# Patient Record
Sex: FEMALE | Race: BLACK OR AFRICAN AMERICAN | NOT HISPANIC OR LATINO | Employment: OTHER | URBAN - METROPOLITAN AREA
[De-identification: names, ages, dates, MRNs, and addresses within clinical notes are randomized per-mention and may not be internally consistent; named-entity substitution may affect disease eponyms.]

---

## 2019-02-27 ENCOUNTER — OFFICE VISIT (OUTPATIENT)
Dept: FAMILY MEDICINE | Facility: CLINIC | Age: 49
End: 2019-02-27
Payer: MEDICARE

## 2019-02-27 VITALS
TEMPERATURE: 99 F | DIASTOLIC BLOOD PRESSURE: 80 MMHG | RESPIRATION RATE: 12 BRPM | WEIGHT: 184.38 LBS | HEIGHT: 65 IN | HEART RATE: 107 BPM | OXYGEN SATURATION: 93 % | BODY MASS INDEX: 30.72 KG/M2 | SYSTOLIC BLOOD PRESSURE: 117 MMHG

## 2019-02-27 DIAGNOSIS — I10 HYPERTENSION, UNSPECIFIED TYPE: ICD-10-CM

## 2019-02-27 DIAGNOSIS — R53.83 FATIGUE, UNSPECIFIED TYPE: ICD-10-CM

## 2019-02-27 DIAGNOSIS — H92.13 OTORRHEA OF BOTH EARS: Primary | ICD-10-CM

## 2019-02-27 DIAGNOSIS — M06.9 RHEUMATOID ARTHRITIS INVOLVING MULTIPLE SITES, UNSPECIFIED RHEUMATOID FACTOR PRESENCE: ICD-10-CM

## 2019-02-27 PROCEDURE — 99213 OFFICE O/P EST LOW 20 MIN: CPT | Mod: S$GLB,,, | Performed by: FAMILY MEDICINE

## 2019-02-27 PROCEDURE — 99213 PR OFFICE/OUTPT VISIT, EST, LEVL III, 20-29 MIN: ICD-10-PCS | Mod: S$GLB,,, | Performed by: FAMILY MEDICINE

## 2019-02-27 RX ORDER — PANTOPRAZOLE SODIUM 40 MG/1
TABLET, DELAYED RELEASE ORAL
Refills: 3 | COMMUNITY
Start: 2019-02-12 | End: 2019-07-17 | Stop reason: SDUPTHER

## 2019-02-27 RX ORDER — ELETRIPTAN HYDROBROMIDE 20 MG/1
TABLET, FILM COATED ORAL
COMMUNITY
End: 2020-08-17

## 2019-02-27 RX ORDER — AMLODIPINE AND BENAZEPRIL HYDROCHLORIDE 5; 10 MG/1; MG/1
CAPSULE ORAL
COMMUNITY
End: 2019-04-01

## 2019-02-27 RX ORDER — FLUTICASONE PROPIONATE 50 MCG
SPRAY, SUSPENSION (ML) NASAL
COMMUNITY
End: 2019-07-17 | Stop reason: SDUPTHER

## 2019-02-27 RX ORDER — KETOCONAZOLE 20 MG/G
CREAM TOPICAL
COMMUNITY
End: 2020-08-17

## 2019-02-27 RX ORDER — FOLIC ACID 1 MG/1
TABLET ORAL
COMMUNITY

## 2019-02-27 RX ORDER — GABAPENTIN 300 MG/1
CAPSULE ORAL
Refills: 3 | COMMUNITY
Start: 2019-01-17 | End: 2019-10-17 | Stop reason: SDUPTHER

## 2019-02-27 RX ORDER — TRAMADOL HYDROCHLORIDE 50 MG/1
TABLET ORAL
Refills: 2 | COMMUNITY
Start: 2018-12-06 | End: 2019-04-17 | Stop reason: SDUPTHER

## 2019-02-27 RX ORDER — LEFLUNOMIDE 10 MG/1
TABLET ORAL
Refills: 3 | COMMUNITY
Start: 2018-12-17 | End: 2019-09-05 | Stop reason: SDUPTHER

## 2019-02-27 RX ORDER — CARVEDILOL 25 MG/1
TABLET ORAL
Refills: 3 | COMMUNITY
Start: 2018-12-06 | End: 2019-10-17 | Stop reason: SDUPTHER

## 2019-02-27 RX ORDER — BENAZEPRIL HYDROCHLORIDE 10 MG/1
TABLET ORAL
Refills: 3 | COMMUNITY
Start: 2018-12-28 | End: 2019-04-01 | Stop reason: SDUPTHER

## 2019-02-27 RX ORDER — GENTAMICIN SULFATE 3 MG/ML
SOLUTION/ DROPS OPHTHALMIC
Refills: 0 | COMMUNITY
Start: 2018-12-08 | End: 2019-07-11

## 2019-02-27 RX ORDER — TIZANIDINE HYDROCHLORIDE 4 MG/1
CAPSULE, GELATIN COATED ORAL
COMMUNITY
End: 2020-01-21 | Stop reason: SDUPTHER

## 2019-02-27 RX ORDER — PRAVASTATIN SODIUM 80 MG/1
TABLET ORAL
Refills: 3 | COMMUNITY
Start: 2018-12-28 | End: 2019-10-17 | Stop reason: SDUPTHER

## 2019-02-27 RX ORDER — LEVOCETIRIZINE DIHYDROCHLORIDE 5 MG/1
TABLET, FILM COATED ORAL
COMMUNITY
End: 2019-06-06 | Stop reason: SDUPTHER

## 2019-02-27 RX ORDER — FUROSEMIDE 20 MG/1
TABLET ORAL
COMMUNITY

## 2019-02-27 RX ORDER — MUPIROCIN 20 MG/G
OINTMENT TOPICAL
COMMUNITY
End: 2023-08-08 | Stop reason: SDUPTHER

## 2019-02-27 RX ORDER — AMOXICILLIN AND CLAVULANATE POTASSIUM 875; 125 MG/1; MG/1
TABLET, FILM COATED ORAL
Refills: 0 | COMMUNITY
Start: 2019-02-21 | End: 2019-07-11

## 2019-02-27 RX ORDER — PHENDIMETRAZINE TARTRATE 35 MG/1
TABLET ORAL
COMMUNITY
End: 2019-07-17 | Stop reason: SDUPTHER

## 2019-02-27 RX ORDER — VENLAFAXINE HYDROCHLORIDE 75 MG/1
CAPSULE, EXTENDED RELEASE ORAL
Refills: 2 | COMMUNITY
Start: 2018-12-07 | End: 2019-07-17 | Stop reason: SDUPTHER

## 2019-02-27 RX ORDER — PROMETHAZINE HYDROCHLORIDE AND CODEINE PHOSPHATE 6.25; 1 MG/5ML; MG/5ML
SOLUTION ORAL
Refills: 0 | COMMUNITY
Start: 2018-12-28 | End: 2019-07-11

## 2019-02-27 RX ORDER — FENOFIBRATE 200 MG/1
CAPSULE ORAL
Refills: 3 | COMMUNITY
Start: 2018-12-28 | End: 2019-09-04 | Stop reason: SDUPTHER

## 2019-02-27 RX ORDER — TRIAMCINOLONE ACETONIDE 1 MG/G
CREAM TOPICAL
COMMUNITY
End: 2020-08-17

## 2019-02-27 RX ORDER — ASPIRIN 81 MG/1
TABLET ORAL
COMMUNITY
Start: 2011-04-12

## 2019-02-27 NOTE — PROGRESS NOTES
Subjective:       Patient ID: Cinid Magaña is a 48 y.o. female.    Chief Complaint: Follow-up (kidney, HTN, RA) and Sinusitis (infection- 3 rounds of antibiotics without significant improvement)    47 yo female with complaint of both ears draining regularly.  She had a recent bout of redness and swelling of her earlobes on both sides. She was given amoxicillin by her nephrologist and the redness improved.  She states she is concerned about about the chronic recurrent drainage that occurs at night.  She had meningitis in 2011.  She states that she has been feeling tired for at least 6 months. She does have rheumatoid arthritis and chronic kidney disease and hypertension.  She states that she has been with more pain desmond usual over the past month.    Ear Drainage    There is pain in both ears. This is a chronic problem. The current episode started more than 1 month ago. The problem has been waxing and waning. There has been no fever. The pain is at a severity of 2/10. The pain is mild. Associated symptoms include coughing and ear discharge. Pertinent negatives include no diarrhea, headaches, hearing loss, neck pain, rash, rhinorrhea, sore throat or vomiting. She has tried antibiotics for the symptoms. The treatment provided significant relief. There is no history of a chronic ear infection, hearing loss or a tympanostomy tube.     Review of Systems   Constitutional: Negative for fever.   HENT: Positive for ear discharge. Negative for ear pain, hearing loss, postnasal drip, rhinorrhea, sinus pain and sore throat.    Eyes: Negative for redness.   Respiratory: Positive for cough. Negative for chest tightness, shortness of breath and wheezing.    Cardiovascular: Negative for chest pain, palpitations and leg swelling.   Gastrointestinal: Negative for constipation, diarrhea, nausea and vomiting.   Genitourinary: Negative for difficulty urinating and dysuria.   Musculoskeletal: Positive for arthralgias. Negative for  neck pain.   Skin: Negative for rash.   Neurological: Negative for dizziness and headaches.       Objective:      Physical Exam   HENT:   Head: Normocephalic and atraumatic.   Right Ear: Hearing, tympanic membrane, external ear and ear canal normal. No swelling or tenderness.   Left Ear: Hearing, tympanic membrane, external ear and ear canal normal. No swelling or tenderness.   Ears:    Nose: Nose normal.   Mouth/Throat: Uvula is midline, oropharynx is clear and moist and mucous membranes are normal.       Assessment:       1. Otorrhea of both ears    2. Fatigue, unspecified type    3. Rheumatoid arthritis involving multiple sites, unspecified rheumatoid factor presence    4. Hypertension, unspecified type        Plan:       Patient will be referred to an ENT for further eval. She will also get me a copy of her recent labs taken by her nephrologist.

## 2019-02-27 NOTE — PROGRESS NOTES
Subjective:       Patient ID: Cindi Magaña is a 48 y.o. female.    Chief Complaint: Follow-up (kidney, HTN, RA) and Sinusitis (infection- 3 rounds of antibiotics without significant improvement)    47 yo female in for with       Review of Systems   Constitutional: Negative for fever.   HENT: Negative for ear pain, postnasal drip, rhinorrhea, sinus pain and sore throat.    Eyes: Negative for redness.   Respiratory: Negative for cough, chest tightness, shortness of breath and wheezing.    Cardiovascular: Negative for chest pain, palpitations and leg swelling.   Gastrointestinal: Negative for constipation, diarrhea, nausea and vomiting.   Genitourinary: Negative for difficulty urinating and dysuria.   Musculoskeletal: Negative for arthralgias.   Skin: Negative for rash.   Neurological: Negative for dizziness.       Objective:      Physical Exam   Constitutional: She is oriented to person, place, and time. She appears well-developed and well-nourished.   HENT:   Head: Normocephalic and atraumatic.   Eyes: Conjunctivae and EOM are normal. Pupils are equal, round, and reactive to light.   Neck: Normal range of motion. Neck supple.   Cardiovascular: Normal rate, regular rhythm and normal heart sounds.   Pulmonary/Chest: Breath sounds normal. She has no wheezes. She has no rales.   Abdominal: Soft. Bowel sounds are normal. She exhibits no distension and no mass. There is no tenderness. There is no guarding.   Musculoskeletal: Normal range of motion. She exhibits no edema or tenderness.   Neurological: She is alert and oriented to person, place, and time. No cranial nerve deficit.   Skin: Skin is warm and dry. No rash noted. No erythema.   Psychiatric: She has a normal mood and affect. Her behavior is normal.       Assessment:       No diagnosis found.    Plan:       ***

## 2019-04-01 ENCOUNTER — OFFICE VISIT (OUTPATIENT)
Dept: RHEUMATOLOGY | Facility: CLINIC | Age: 49
End: 2019-04-01
Payer: MEDICARE

## 2019-04-01 VITALS
BODY MASS INDEX: 30.99 KG/M2 | TEMPERATURE: 97 F | HEIGHT: 65 IN | RESPIRATION RATE: 16 BRPM | DIASTOLIC BLOOD PRESSURE: 80 MMHG | HEART RATE: 76 BPM | WEIGHT: 186 LBS | SYSTOLIC BLOOD PRESSURE: 110 MMHG

## 2019-04-01 DIAGNOSIS — M46.90 INFLAMMATORY SPONDYLOPATHY, UNSPECIFIED SPINAL REGION: ICD-10-CM

## 2019-04-01 DIAGNOSIS — M06.9 RHEUMATOID ARTHRITIS INVOLVING MULTIPLE SITES, UNSPECIFIED RHEUMATOID FACTOR PRESENCE: ICD-10-CM

## 2019-04-01 DIAGNOSIS — M46.1 INFLAMMATION OF SACROILIAC JOINT: ICD-10-CM

## 2019-04-01 DIAGNOSIS — M54.17 LUMBOSACRAL RADICULOPATHY: ICD-10-CM

## 2019-04-01 DIAGNOSIS — R80.9 PROTEINURIA, UNSPECIFIED TYPE: ICD-10-CM

## 2019-04-01 DIAGNOSIS — R94.4 ABNORMAL CREATININE CLEARANCE GLOMERULAR FILTRATION: ICD-10-CM

## 2019-04-01 DIAGNOSIS — M19.90 UNDIFFERENTIATED INFLAMMATORY OLIGOARTHRITIS: Primary | ICD-10-CM

## 2019-04-01 DIAGNOSIS — M35.00 SJOGREN'S SYNDROME, WITH UNSPECIFIED ORGAN INVOLVEMENT: ICD-10-CM

## 2019-04-01 DIAGNOSIS — M45.9 ANKYLOSING SPONDYLITIS, UNSPECIFIED SITE OF SPINE: ICD-10-CM

## 2019-04-01 PROBLEM — M70.60 TROCHANTERIC BURSITIS: Status: RESOLVED | Noted: 2019-04-01 | Resolved: 2019-04-01

## 2019-04-01 PROBLEM — N18.9 CHRONIC RENAL IMPAIRMENT: Status: ACTIVE | Noted: 2019-04-01

## 2019-04-01 PROBLEM — M70.60 TROCHANTERIC BURSITIS: Status: ACTIVE | Noted: 2019-04-01

## 2019-04-01 PROCEDURE — 99214 PR OFFICE/OUTPT VISIT, EST, LEVL IV, 30-39 MIN: ICD-10-PCS | Mod: S$GLB,,, | Performed by: INTERNAL MEDICINE

## 2019-04-01 PROCEDURE — 99214 OFFICE O/P EST MOD 30 MIN: CPT | Mod: S$GLB,,, | Performed by: INTERNAL MEDICINE

## 2019-04-01 RX ORDER — BENAZEPRIL HYDROCHLORIDE 10 MG/1
TABLET ORAL
Qty: 90 TABLET | Refills: 3 | Status: SHIPPED | OUTPATIENT
Start: 2019-04-01 | End: 2020-04-03 | Stop reason: SDUPTHER

## 2019-04-01 NOTE — PROGRESS NOTES
Subjective:       Patient ID: Cindi Magaña is a 48 y.o. female.    Chief Complaint: Follow-up (with labs)    HPI no facial skin rash hair loss, sore in mouth or nose and mnom acuge gouty arthritis.cointinue ion gabapentin 300 mg tid and is on Gabapentin helping back and neck pain        Current medications include:  Current Outpatient Medications on File Prior to Visit   Medication Sig Dispense Refill    amlodipine-benazepril 5-10 mg (LOTREL) 5-10 mg per capsule amlodipine 5 mg-benazepril 10 mg capsule      amoxicillin-clavulanate 875-125mg (AUGMENTIN) 875-125 mg per tablet TK 1 T PO BID  FOR 10 DAYS  0    aspirin (ECOTRIN) 81 MG EC tablet Aspir-81 81 mg tablet,delayed release   Take 1 tablet every day by oral route.      benazepril (LOTENSIN) 10 MG tablet TK 1 T PO  QD  3    carvedilol (COREG) 25 MG tablet TK 1 T PO BID  3    eletriptan (RELPAX) 20 MG tablet Relpax 20 mg tablet   Take 1 tablet once daily as needed      fenofibrate micronized (LOFIBRA) 200 MG Cap TK 1 C PO QD  3    fluticasone (FLONASE) 50 mcg/actuation nasal spray fluticasone 50 mcg/actuation nasal spray,suspension   SHAKE LQ AND U 1 SPR IEN BID      folic acid (FOLVITE) 1 MG tablet folic acid 1 mg tablet   TK 2 TS PO QD      furosemide (LASIX) 20 MG tablet furosemide 20 mg tablet      gabapentin (NEURONTIN) 300 MG capsule TK 1 C PO TID  3    gentamicin (GARAMYCIN) 0.3 % ophthalmic solution INT 1 GTT IN LEFT EYE Q 4 H  0    ketoconazole (NIZORAL) 2 % cream ketoconazole 2 % topical cream      leflunomide (ARAVA) 10 MG Tab TK 1 T PO QD WC  3    levocetirizine (XYZAL) 5 MG tablet levocetirizine 5 mg tablet      mupirocin (BACTROBAN) 2 % ointment mupirocin 2 % topical ointment      pantoprazole (PROTONIX) 40 MG tablet TK 1 T PO QD  3    phendimetrazine tartrate 35 mg Tab phendimetrazine tartrate 35 mg tablet   TK 1 T PO BID      pravastatin (PRAVACHOL) 80 MG tablet TK 1 T PO QHS  3    promethazine-codeine 6.25-10 mg/5 ml  "(PHENERGAN WITH CODEINE) 6.25-10 mg/5 mL syrup TK 1 GAVIN PO Q 6 H  0    tiZANidine 4 mg Cap tizanidine 4 mg tablet   TK 1 T PO TID      traMADol (ULTRAM) 50 mg tablet TK 1 T PO Q 12 H PRN  2    triamcinolone acetonide 0.1% (KENALOG) 0.1 % cream triamcinolone acetonide 0.1 % topical ointment      venlafaxine (EFFEXOR-XR) 75 MG 24 hr capsule TK 1 C PO QD  2     No current facility-administered medications on file prior to visit.        Lab Results:  No results found for: WBC, RBC, HGB, HCT, MCV, COLORU, SPECGRAV, PHUR, WBCUR, NITRITE, GLUCOSEUR, KETONESU, BILIRUBINUR, RBCUR, NA, K, CL, CO2, GLU, BUN, CREATININE     Review of Systems   Constitutional: Positive for diaphoresis.   HENT: Negative.         Dryness in eyes and m,outh   Respiratory: Negative.    Cardiovascular: Negative.    Endocrine: Negative.    Genitourinary: Positive for difficulty urinating and frequency.   Musculoskeletal: Positive for arthralgias and back pain.   Allergic/Immunologic: Positive for environmental allergies.   Neurological: Positive for weakness and numbness.   Hematological: Positive for adenopathy.         Objective:   /80 (BP Location: Right arm, Patient Position: Sitting, BP Method: Large (Manual))   Pulse 76   Temp 97 °F (36.1 °C) (Temporal)   Resp 16   Ht 5' 5" (1.651 m)   Wt 84.4 kg (186 lb)   BMI 30.95 kg/m²      Physical Exam   Constitutional: She is well-developed, well-nourished, and in no distress.   HENT:   Dry eyes and mouth   Neck: Neck supple.   Cardiovascular: Normal rate and regular rhythm.    Abdominal: Soft.       Right Side Rheumatological Exam     The patient is tender to palpation of the shoulder    Muscle Strength (0-5 scale):  Neck Flexion:  4  Deltoid:  4  : 4/5   Quadriceps:  4     Left Side Rheumatological Exam     The patient is tender to palpation of the shoulder.    Muscle Strength (0-5 scale):  Neck Flexion:  4  Deltoid:  4  Quadriceps:  4       Musculoskeletal: Normal range of motion. "   SLR=negative           Assessment:       1. Undifferentiated inflammatory oligoarthritis    2. Ankylosing spondylitis, unspecified site of spine    3. Inflammation of sacroiliac joint    4. Inflammatory spondylopathy, unspecified spinal region    5. Rheumatoid arthritis involving multiple sites, unspecified rheumatoid factor presence    6. Sjogren's syndrome, with unspecified organ involvement    7. Abnormal creatinine clearance glomerular filtration    8. Proteinuria, unspecified type    9. Lumbosacral radiculopathy            Plan:       conyinue urrent medication and dosis

## 2019-04-17 ENCOUNTER — OFFICE VISIT (OUTPATIENT)
Dept: FAMILY MEDICINE | Facility: CLINIC | Age: 49
End: 2019-04-17
Payer: MEDICARE

## 2019-04-17 VITALS
HEIGHT: 65 IN | TEMPERATURE: 98 F | BODY MASS INDEX: 31.35 KG/M2 | WEIGHT: 188.19 LBS | HEART RATE: 76 BPM | DIASTOLIC BLOOD PRESSURE: 87 MMHG | SYSTOLIC BLOOD PRESSURE: 122 MMHG

## 2019-04-17 DIAGNOSIS — I10 ESSENTIAL HYPERTENSION: ICD-10-CM

## 2019-04-17 DIAGNOSIS — M54.16 LUMBAR RADICULOPATHY, CHRONIC: Primary | ICD-10-CM

## 2019-04-17 DIAGNOSIS — K59.00 CONSTIPATION, UNSPECIFIED CONSTIPATION TYPE: ICD-10-CM

## 2019-04-17 PROBLEM — M75.50 BURSITIS OF SHOULDER: Status: ACTIVE | Noted: 2019-04-17

## 2019-04-17 PROBLEM — I63.9 CEREBROVASCULAR ACCIDENT: Status: ACTIVE | Noted: 2019-04-17

## 2019-04-17 PROBLEM — M47.817 LUMBOSACRAL SPONDYLOSIS: Status: ACTIVE | Noted: 2019-04-17

## 2019-04-17 PROBLEM — E78.5 HYPERLIPIDEMIA: Status: ACTIVE | Noted: 2018-01-26

## 2019-04-17 PROCEDURE — 99214 OFFICE O/P EST MOD 30 MIN: CPT | Mod: S$GLB,,, | Performed by: FAMILY MEDICINE

## 2019-04-17 PROCEDURE — 99214 PR OFFICE/OUTPT VISIT, EST, LEVL IV, 30-39 MIN: ICD-10-PCS | Mod: S$GLB,,, | Performed by: FAMILY MEDICINE

## 2019-04-17 RX ORDER — POLYETHYLENE GLYCOL 3350 17 G/17G
17 POWDER, FOR SOLUTION ORAL DAILY
Qty: 510 G | Refills: 3 | Status: SHIPPED | OUTPATIENT
Start: 2019-04-17 | End: 2019-04-24 | Stop reason: SDUPTHER

## 2019-04-17 RX ORDER — TRAMADOL HYDROCHLORIDE 50 MG/1
TABLET ORAL
Qty: 60 TABLET | Refills: 2 | Status: SHIPPED | OUTPATIENT
Start: 2019-04-17 | End: 2019-07-17 | Stop reason: SDUPTHER

## 2019-04-17 NOTE — PROGRESS NOTES
Subjective:       Patient ID: Cindi Magaña is a 48 y.o. female.    Chief Complaint: No chief complaint on file.    47 yo female in for follow up.  She was referred to ENT at last visit for bilateral ear itching and irritation.  She was given CiproDex and Clotbetasol topical solutions which helped. She complains of low back pain that is radiating down the left leg.  Se states that her leg pain is getting worse than before.  She has tried different meds and PT and it has helped only temporarily.  She also has RA and ankylosing spondylitis as well. She states her blood pressure has been doing good. She need a refill of tramadol and she need a refill of Miralax for constipation.    Back Pain   This is a chronic problem. The current episode started more than 1 year ago. The problem occurs constantly. The problem has been gradually worsening since onset. The pain is present in the gluteal and lumbar spine. The quality of the pain is described as aching and shooting. The pain radiates to the right foot. The pain is at a severity of 7/10. The pain is moderate. The symptoms are aggravated by standing and sitting. Associated symptoms include headaches, leg pain, numbness and tingling. Pertinent negatives include no abdominal pain, bladder incontinence, bowel incontinence, chest pain, dysuria, fever, paresis, paresthesias, pelvic pain, perianal numbness, weakness or weight loss. Risk factors include obesity and menopause. She has tried analgesics, muscle relaxant, NSAIDs and walking for the symptoms. The treatment provided mild relief.     Review of Systems   Constitutional: Negative for fever and weight loss.   HENT: Negative for ear pain, postnasal drip, rhinorrhea, sinus pain and sore throat.    Eyes: Negative for redness.   Respiratory: Negative for cough, chest tightness, shortness of breath and wheezing.    Cardiovascular: Negative for chest pain, palpitations and leg swelling.   Gastrointestinal: Negative for  abdominal pain, bowel incontinence, constipation, diarrhea, nausea and vomiting.   Genitourinary: Negative for bladder incontinence, difficulty urinating, dysuria and pelvic pain.   Musculoskeletal: Positive for arthralgias and back pain.   Skin: Negative for rash.   Neurological: Positive for tingling, numbness and headaches. Negative for dizziness, weakness and paresthesias.       Objective:      Physical Exam   Constitutional: She is oriented to person, place, and time. She appears well-developed and well-nourished.   HENT:   Head: Normocephalic and atraumatic.   Eyes: Pupils are equal, round, and reactive to light. Conjunctivae and EOM are normal.   Neck: Normal range of motion. Neck supple.   Cardiovascular: Normal rate, regular rhythm and normal heart sounds.   Pulmonary/Chest: Breath sounds normal. She has no wheezes. She has no rales.   Abdominal: Soft. Bowel sounds are normal. She exhibits no distension and no mass. There is no tenderness. There is no guarding.   Musculoskeletal: Normal range of motion. She exhibits no edema.        Lumbar back: She exhibits tenderness, pain and spasm.        Right upper leg: She exhibits tenderness.   Neurological: She is alert and oriented to person, place, and time. No cranial nerve deficit.   Skin: Skin is warm and dry. No rash noted. No erythema.   Psychiatric: She has a normal mood and affect. Her behavior is normal.       Assessment:       1. Lumbar radiculopathy, chronic    2. Constipation, unspecified constipation type    3. Essential hypertension        Plan:     MRI of the lumbar spine for further eval and send for a LEDSI if needed.    Refill tramadol and Miralax.    Follow up in 3 months.

## 2019-04-24 DIAGNOSIS — K59.00 CONSTIPATION, UNSPECIFIED CONSTIPATION TYPE: ICD-10-CM

## 2019-04-24 RX ORDER — POLYETHYLENE GLYCOL 3350 17 G/17G
17 POWDER, FOR SOLUTION ORAL DAILY
Qty: 510 G | Refills: 3 | Status: SHIPPED | OUTPATIENT
Start: 2019-04-24 | End: 2021-07-27

## 2019-04-25 DIAGNOSIS — M51.9 LUMBAR DISC DISEASE: Primary | ICD-10-CM

## 2019-06-06 RX ORDER — LEVOCETIRIZINE DIHYDROCHLORIDE 5 MG/1
5 TABLET, FILM COATED ORAL NIGHTLY
Qty: 30 TABLET | Refills: 5 | Status: SHIPPED | OUTPATIENT
Start: 2019-06-06 | End: 2020-08-17

## 2019-07-11 ENCOUNTER — OFFICE VISIT (OUTPATIENT)
Dept: RHEUMATOLOGY | Facility: CLINIC | Age: 49
End: 2019-07-11
Payer: MEDICARE

## 2019-07-11 VITALS
HEART RATE: 71 BPM | SYSTOLIC BLOOD PRESSURE: 100 MMHG | WEIGHT: 194 LBS | TEMPERATURE: 96 F | DIASTOLIC BLOOD PRESSURE: 70 MMHG | BODY MASS INDEX: 32.32 KG/M2 | RESPIRATION RATE: 16 BRPM | HEIGHT: 65 IN

## 2019-07-11 DIAGNOSIS — N18.9 CHRONIC RENAL IMPAIRMENT, UNSPECIFIED CKD STAGE: ICD-10-CM

## 2019-07-11 DIAGNOSIS — R80.9 PROTEINURIA, UNSPECIFIED TYPE: ICD-10-CM

## 2019-07-11 DIAGNOSIS — M17.9 OSTEOARTHRITIS OF KNEE, UNSPECIFIED LATERALITY, UNSPECIFIED OSTEOARTHRITIS TYPE: ICD-10-CM

## 2019-07-11 DIAGNOSIS — M45.9 ANKYLOSING SPONDYLITIS, UNSPECIFIED SITE OF SPINE: Primary | ICD-10-CM

## 2019-07-11 DIAGNOSIS — M35.00 SJOGREN'S SYNDROME WITHOUT EXTRAGLANDULAR INVOLVEMENT: ICD-10-CM

## 2019-07-11 DIAGNOSIS — M75.50 BURSITIS OF SHOULDER, UNSPECIFIED LATERALITY: ICD-10-CM

## 2019-07-11 PROBLEM — G56.00 CARPAL TUNNEL SYNDROME: Status: ACTIVE | Noted: 2019-07-11

## 2019-07-11 PROCEDURE — 99214 OFFICE O/P EST MOD 30 MIN: CPT | Mod: S$GLB,,, | Performed by: INTERNAL MEDICINE

## 2019-07-11 PROCEDURE — 99214 PR OFFICE/OUTPT VISIT, EST, LEVL IV, 30-39 MIN: ICD-10-PCS | Mod: S$GLB,,, | Performed by: INTERNAL MEDICINE

## 2019-07-11 NOTE — PROGRESS NOTES
Subjective:       Patient ID: Cindi Magaña is a 48 y.o. female.    Chief Complaint: Follow-up    HPI continue on Leflunomide 10 mg once a day with folic acid. Gabapentin 300 mg tid and has helped lower back and neck . No acutely swollen tender painful inflammed joint. Has lower back associated with sciatica and obtaine relikef  After 2 injectiont . Dryness in eye helped by Systane and sipws more water.Sees Dr Kay and gfr stable. Shoulders with stiffness         Current medications include:  Current Outpatient Medications on File Prior to Visit   Medication Sig Dispense Refill    aspirin (ECOTRIN) 81 MG EC tablet Aspir-81 81 mg tablet,delayed release   Take 1 tablet every day by oral route.      benazepril (LOTENSIN) 10 MG tablet TAKE 1 TABLET BY MOUTH EVERY DAY 90 tablet 3    carvedilol (COREG) 25 MG tablet TK 1 T PO BID  3    eletriptan (RELPAX) 20 MG tablet Relpax 20 mg tablet   Take 1 tablet once daily as needed      fenofibrate micronized (LOFIBRA) 200 MG Cap TK 1 C PO QD  3    fluticasone (FLONASE) 50 mcg/actuation nasal spray fluticasone 50 mcg/actuation nasal spray,suspension   SHAKE LQ AND U 1 SPR IEN BID      folic acid (FOLVITE) 1 MG tablet folic acid 1 mg tablet   TK 2 TS PO QD      furosemide (LASIX) 20 MG tablet furosemide 20 mg tablet      gabapentin (NEURONTIN) 300 MG capsule TK 1 C PO TID  3    ketoconazole (NIZORAL) 2 % cream ketoconazole 2 % topical cream      leflunomide (ARAVA) 10 MG Tab TK 1 T PO QD WC  3    levocetirizine (XYZAL) 5 MG tablet Take 1 tablet (5 mg total) by mouth every evening. 30 tablet 5    mupirocin (BACTROBAN) 2 % ointment mupirocin 2 % topical ointment      pantoprazole (PROTONIX) 40 MG tablet TK 1 T PO QD  3    phendimetrazine tartrate 35 mg Tab phendimetrazine tartrate 35 mg tablet   TK 1 T PO BID      polyethylene glycol (GLYCOLAX) 17 gram/dose powder Take 17 g by mouth once daily. 510 g 3    pravastatin (PRAVACHOL) 80 MG tablet TK 1 T PO QHS  3  "   tiZANidine 4 mg Cap tizanidine 4 mg tablet   TK 1 T PO TID      traMADol (ULTRAM) 50 mg tablet TK 1 T PO Q 12 H PRN 60 tablet 2    triamcinolone acetonide 0.1% (KENALOG) 0.1 % cream triamcinolone acetonide 0.1 % topical ointment      venlafaxine (EFFEXOR-XR) 75 MG 24 hr capsule TK 1 C PO QD  2    [DISCONTINUED] amoxicillin-clavulanate 875-125mg (AUGMENTIN) 875-125 mg per tablet TK 1 T PO BID  FOR 10 DAYS  0    [DISCONTINUED] ciprofloxacin-hydrocortisone 0.2-1% (CIPRO HC) otic suspension Place 1 drop into both ears 2 (two) times daily as needed.      [DISCONTINUED] gentamicin (GARAMYCIN) 0.3 % ophthalmic solution INT 1 GTT IN LEFT EYE Q 4 H  0    [DISCONTINUED] promethazine-codeine 6.25-10 mg/5 ml (PHENERGAN WITH CODEINE) 6.25-10 mg/5 mL syrup TK 1 GAVIN PO Q 6 H  0     No current facility-administered medications on file prior to visit.        Lab Results:  No results found for: WBC, RBC, HGB, HCT, MCV, COLORU, SPECGRAV, PHUR, WBCUR, NITRITE, GLUCOSEUR, KETONESU, BILIRUBINUR, RBCUR, NA, K, CL, CO2, GLU, BUN, CREATININE     Review of Systems   Constitutional: Positive for diaphoresis.   HENT:        Dryness in eyes and mouth   Eyes: Negative.    Respiratory: Negative.    Cardiovascular: Negative.    Gastrointestinal: Positive for abdominal pain, constipation and nausea. Negative for abdominal distention.   Endocrine: Negative.    Genitourinary: Positive for frequency.   Musculoskeletal: Positive for arthralgias and back pain.   Allergic/Immunologic: Positive for environmental allergies.   Neurological: Positive for numbness.        And tingling in feet   Hematological: Negative.    Psychiatric/Behavioral: Negative.          Objective:   /70 (BP Location: Right arm, Patient Position: Sitting, BP Method: Large (Manual))   Pulse 71   Temp 96.1 °F (35.6 °C) (Temporal)   Resp 16   Ht 5' 5" (1.651 m)   Wt 88 kg (194 lb)   BMI 32.28 kg/m²      Physical Exam   Constitutional: She is well-developed, " well-nourished, and in no distress.   HENT:   Dryness in eyes and mouth   Eyes: Conjunctivae are normal.   Neck: Normal range of motion. Neck supple.   Cardiovascular: Normal rate, regular rhythm and normal heart sounds.    Pulmonary/Chest: Effort normal and breath sounds normal.   Abdominal: Soft.   Neurological:   SLR=negative   Skin: Skin is warm and dry.     Musculoskeletal:   Rt shoulder sa bursae tenderness and POM           Assessment:       1. Ankylosing spondylitis, unspecified site of spine    2. Sjogren's syndrome without extraglandular involvement    3. Bursitis of shoulder, unspecified laterality    4. Osteoarthritis of knee, unspecified laterality, unspecified osteoarthritis type    5. Chronic renal impairment, unspecified CKD stage    6. Proteinuria, unspecified type            Plan:        will continue curent medications and dosis

## 2019-07-17 ENCOUNTER — OFFICE VISIT (OUTPATIENT)
Dept: FAMILY MEDICINE | Facility: CLINIC | Age: 49
End: 2019-07-17
Payer: MEDICARE

## 2019-07-17 VITALS
WEIGHT: 196.5 LBS | HEIGHT: 65 IN | DIASTOLIC BLOOD PRESSURE: 86 MMHG | SYSTOLIC BLOOD PRESSURE: 119 MMHG | HEART RATE: 73 BPM | OXYGEN SATURATION: 97 % | TEMPERATURE: 96 F | BODY MASS INDEX: 32.74 KG/M2

## 2019-07-17 DIAGNOSIS — M06.9 RHEUMATOID ARTHRITIS INVOLVING MULTIPLE SITES, UNSPECIFIED RHEUMATOID FACTOR PRESENCE: ICD-10-CM

## 2019-07-17 DIAGNOSIS — M54.16 LUMBAR RADICULOPATHY, CHRONIC: ICD-10-CM

## 2019-07-17 DIAGNOSIS — Z12.31 ENCOUNTER FOR SCREENING MAMMOGRAM FOR BREAST CANCER: ICD-10-CM

## 2019-07-17 DIAGNOSIS — K21.9 GASTROESOPHAGEAL REFLUX DISEASE WITHOUT ESOPHAGITIS: ICD-10-CM

## 2019-07-17 DIAGNOSIS — F41.9 ANXIETY: ICD-10-CM

## 2019-07-17 DIAGNOSIS — J30.9 ALLERGIC RHINITIS, UNSPECIFIED SEASONALITY, UNSPECIFIED TRIGGER: ICD-10-CM

## 2019-07-17 DIAGNOSIS — E66.09 CLASS 1 OBESITY DUE TO EXCESS CALORIES WITH SERIOUS COMORBIDITY AND BODY MASS INDEX (BMI) OF 32.0 TO 32.9 IN ADULT: ICD-10-CM

## 2019-07-17 DIAGNOSIS — I10 ESSENTIAL HYPERTENSION: ICD-10-CM

## 2019-07-17 PROBLEM — E66.811 CLASS 1 OBESITY DUE TO EXCESS CALORIES WITH SERIOUS COMORBIDITY AND BODY MASS INDEX (BMI) OF 32.0 TO 32.9 IN ADULT: Status: ACTIVE | Noted: 2019-07-17

## 2019-07-17 PROCEDURE — 99214 OFFICE O/P EST MOD 30 MIN: CPT | Mod: S$GLB,,, | Performed by: FAMILY MEDICINE

## 2019-07-17 PROCEDURE — 99214 PR OFFICE/OUTPT VISIT, EST, LEVL IV, 30-39 MIN: ICD-10-PCS | Mod: S$GLB,,, | Performed by: FAMILY MEDICINE

## 2019-07-17 RX ORDER — VENLAFAXINE HYDROCHLORIDE 75 MG/1
CAPSULE, EXTENDED RELEASE ORAL
Qty: 90 CAPSULE | Refills: 3 | Status: SHIPPED | OUTPATIENT
Start: 2019-07-17 | End: 2020-08-17 | Stop reason: SDUPTHER

## 2019-07-17 RX ORDER — TRAMADOL HYDROCHLORIDE 50 MG/1
TABLET ORAL
Qty: 60 TABLET | Refills: 2 | Status: SHIPPED | OUTPATIENT
Start: 2019-07-17 | End: 2019-10-16 | Stop reason: SDUPTHER

## 2019-07-17 RX ORDER — FLUTICASONE PROPIONATE 50 MCG
SPRAY, SUSPENSION (ML) NASAL
Qty: 3 BOTTLE | Refills: 3 | Status: SHIPPED | OUTPATIENT
Start: 2019-07-17 | End: 2022-02-07 | Stop reason: SDUPTHER

## 2019-07-17 RX ORDER — PHENDIMETRAZINE TARTRATE 35 MG/1
TABLET ORAL
Qty: 60 EACH | Refills: 2 | Status: SHIPPED | OUTPATIENT
Start: 2019-07-17 | End: 2020-08-17

## 2019-07-17 RX ORDER — DICLOFENAC SODIUM 10 MG/G
2 GEL TOPICAL 4 TIMES DAILY
Qty: 3 TUBE | Refills: 3 | Status: SHIPPED | OUTPATIENT
Start: 2019-07-17 | End: 2020-08-17

## 2019-07-17 RX ORDER — PANTOPRAZOLE SODIUM 40 MG/1
TABLET, DELAYED RELEASE ORAL
Qty: 90 TABLET | Refills: 3 | Status: SHIPPED | OUTPATIENT
Start: 2019-07-17 | End: 2020-08-17 | Stop reason: SDUPTHER

## 2019-07-17 NOTE — PROGRESS NOTES
Subjective:       Patient ID: Cindi Magaña is a 48 y.o. female.    Chief Complaint: Follow-up and Back Pain    48-year-old female in for follow-up.  Patient needs refills of medications that she takes for gastro esophageal reflux disease, allergic rhinitis, anxiety, tramadol for sciatic nerve pain and she wants to restart weight management program.  She did received a steroid injection for her back and it really helped and she is currently not having any back pain. She also needs an order for her annual screening mammogram.    Review of Systems   Constitutional: Negative for fever.   HENT: Negative for ear pain, postnasal drip, rhinorrhea, sinus pain and sore throat.    Eyes: Negative for redness.   Respiratory: Negative for cough, chest tightness, shortness of breath and wheezing.    Cardiovascular: Negative for chest pain, palpitations and leg swelling.   Gastrointestinal: Negative for constipation, diarrhea, nausea and vomiting.   Genitourinary: Negative for difficulty urinating and dysuria.   Musculoskeletal: Positive for arthralgias.   Skin: Negative for rash.   Neurological: Negative for dizziness.       Objective:      Physical Exam   Constitutional: She is oriented to person, place, and time. She appears well-developed and well-nourished.   HENT:   Head: Normocephalic and atraumatic.   Eyes: Pupils are equal, round, and reactive to light. Conjunctivae and EOM are normal.   Neck: Normal range of motion. Neck supple.   Cardiovascular: Normal rate, regular rhythm and normal heart sounds.   Pulmonary/Chest: Breath sounds normal. She has no wheezes. She has no rales.   Abdominal: Soft. Bowel sounds are normal. She exhibits no distension and no mass. There is no tenderness. There is no guarding.   Musculoskeletal: Normal range of motion. She exhibits no edema or tenderness.   Neurological: She is alert and oriented to person, place, and time. No cranial nerve deficit.   Skin: Skin is warm and dry. No rash  noted. No erythema.   Psychiatric: She has a normal mood and affect. Her behavior is normal.   Nursing note and vitals reviewed.      Assessment:       1. Essential hypertension    2. Anxiety    3. Gastroesophageal reflux disease without esophagitis    4. Allergic rhinitis, unspecified seasonality, unspecified trigger    5. Lumbar radiculopathy, chronic    6. Rheumatoid arthritis involving multiple sites, unspecified rheumatoid factor presence    7. Class 1 obesity due to excess calories with serious comorbidity and body mass index (BMI) of 32.0 to 32.9 in adult    8. Encounter for screening mammogram for breast cancer        Plan:     hypertension is chronic and controlled on current med.  Anxiety is chronic and controlled on venlafaxine 75 mg.  Refill of Protonix for GERD.  Refill Flonase for allergic rhinitis.  Refill of tramadol 50 mg twice a day for rheumatoid arthritis.  An order for a screening mammogram will be placed.  Weight loss is strongly encouraged through diet and exercise and I will refill the bontrel to be taken 35 mg twice daily as well.

## 2019-09-04 DIAGNOSIS — E78.2 MIXED HYPERLIPIDEMIA: Primary | ICD-10-CM

## 2019-09-04 DIAGNOSIS — F41.9 ANXIETY: ICD-10-CM

## 2019-09-04 RX ORDER — FENOFIBRATE 200 MG/1
200 CAPSULE ORAL
Qty: 90 CAPSULE | Refills: 3 | Status: SHIPPED | OUTPATIENT
Start: 2019-09-04 | End: 2020-08-17 | Stop reason: SDUPTHER

## 2019-09-05 RX ORDER — LEFLUNOMIDE 10 MG/1
TABLET ORAL
Qty: 90 TABLET | Refills: 3 | Status: SHIPPED | OUTPATIENT
Start: 2019-09-05 | End: 2020-11-17 | Stop reason: SDUPTHER

## 2019-10-16 DIAGNOSIS — M54.16 LUMBAR RADICULOPATHY, CHRONIC: Primary | ICD-10-CM

## 2019-10-16 RX ORDER — TRAMADOL HYDROCHLORIDE 50 MG/1
TABLET ORAL
Qty: 60 TABLET | Refills: 2 | Status: SHIPPED | OUTPATIENT
Start: 2019-10-16 | End: 2019-12-02 | Stop reason: SDUPTHER

## 2019-10-17 ENCOUNTER — OFFICE VISIT (OUTPATIENT)
Dept: FAMILY MEDICINE | Facility: CLINIC | Age: 49
End: 2019-10-17
Payer: MEDICARE

## 2019-10-17 VITALS
WEIGHT: 198 LBS | SYSTOLIC BLOOD PRESSURE: 121 MMHG | HEART RATE: 79 BPM | DIASTOLIC BLOOD PRESSURE: 88 MMHG | BODY MASS INDEX: 32.95 KG/M2 | TEMPERATURE: 97 F | OXYGEN SATURATION: 99 %

## 2019-10-17 DIAGNOSIS — Z76.0 MEDICATION REFILL: ICD-10-CM

## 2019-10-17 DIAGNOSIS — Z01.419 WELL WOMAN EXAM: ICD-10-CM

## 2019-10-17 DIAGNOSIS — E66.09 CLASS 1 OBESITY DUE TO EXCESS CALORIES WITHOUT SERIOUS COMORBIDITY WITH BODY MASS INDEX (BMI) OF 32.0 TO 32.9 IN ADULT: ICD-10-CM

## 2019-10-17 DIAGNOSIS — I10 ESSENTIAL HYPERTENSION: ICD-10-CM

## 2019-10-17 DIAGNOSIS — E78.2 MIXED HYPERLIPIDEMIA: ICD-10-CM

## 2019-10-17 PROCEDURE — 90686 FLU VACCINE (QUAD) GREATER THAN OR EQUAL TO 3YO PRESERVATIVE FREE IM: ICD-10-PCS | Mod: S$GLB,ICN,, | Performed by: FAMILY MEDICINE

## 2019-10-17 PROCEDURE — G0101 CA SCREEN;PELVIC/BREAST EXAM: HCPCS | Mod: S$GLB,ICN,, | Performed by: FAMILY MEDICINE

## 2019-10-17 PROCEDURE — 90686 IIV4 VACC NO PRSV 0.5 ML IM: CPT | Mod: S$GLB,ICN,, | Performed by: FAMILY MEDICINE

## 2019-10-17 PROCEDURE — G0008 ADMIN INFLUENZA VIRUS VAC: HCPCS | Mod: S$GLB,ICN,, | Performed by: FAMILY MEDICINE

## 2019-10-17 PROCEDURE — G0101 PR CA SCREEN;PELVIC/BREAST EXAM: ICD-10-PCS | Mod: S$GLB,ICN,, | Performed by: FAMILY MEDICINE

## 2019-10-17 PROCEDURE — G0008 FLU VACCINE (QUAD) GREATER THAN OR EQUAL TO 3YO PRESERVATIVE FREE IM: ICD-10-PCS | Mod: S$GLB,ICN,, | Performed by: FAMILY MEDICINE

## 2019-10-17 PROCEDURE — 99213 PR OFFICE/OUTPT VISIT, EST, LEVL III, 20-29 MIN: ICD-10-PCS | Mod: 25,S$GLB,ICN, | Performed by: FAMILY MEDICINE

## 2019-10-17 PROCEDURE — 99213 OFFICE O/P EST LOW 20 MIN: CPT | Mod: 25,S$GLB,ICN, | Performed by: FAMILY MEDICINE

## 2019-10-17 RX ORDER — CARVEDILOL 25 MG/1
25 TABLET ORAL 2 TIMES DAILY WITH MEALS
Qty: 180 TABLET | Refills: 3 | Status: SHIPPED | OUTPATIENT
Start: 2019-10-17 | End: 2020-08-17 | Stop reason: SDUPTHER

## 2019-10-17 RX ORDER — GABAPENTIN 300 MG/1
300 CAPSULE ORAL 3 TIMES DAILY
Qty: 270 CAPSULE | Refills: 3 | Status: SHIPPED | OUTPATIENT
Start: 2019-10-17 | End: 2020-01-23 | Stop reason: SDUPTHER

## 2019-10-17 RX ORDER — PRAVASTATIN SODIUM 80 MG/1
80 TABLET ORAL NIGHTLY
Qty: 90 TABLET | Refills: 3 | Status: SHIPPED | OUTPATIENT
Start: 2019-10-17 | End: 2020-08-17 | Stop reason: SDUPTHER

## 2019-10-17 NOTE — PROGRESS NOTES
SUBJECTIVE:   49 y.o. female for annual routine Pap and checkup.  She reports she has been doing well.  She does need a refill on carvedilol pravastatin and gabapentin.  She states she has been trying to lose weight but medication she had been taking has not been helping.  She has been trying to watch what she eats and has been exercising.  Her last Pap smear was about 3 years ago.  She had a colonoscopy done on last year.  She has an appointment for her annual screening mammogram on tomorrow.  Current Outpatient Medications   Medication Sig Dispense Refill    aspirin (ECOTRIN) 81 MG EC tablet Aspir-81 81 mg tablet,delayed release   Take 1 tablet every day by oral route.      benazepril (LOTENSIN) 10 MG tablet TAKE 1 TABLET BY MOUTH EVERY DAY 90 tablet 3    carvedilol (COREG) 25 MG tablet TK 1 T PO BID  3    diclofenac sodium (VOLTAREN) 1 % Gel Apply 2 g topically 4 (four) times daily. 3 Tube 3    eletriptan (RELPAX) 20 MG tablet Relpax 20 mg tablet   Take 1 tablet once daily as needed      fenofibrate micronized (LOFIBRA) 200 MG Cap Take 1 capsule (200 mg total) by mouth daily with breakfast. 90 capsule 3    fluticasone propionate (FLONASE) 50 mcg/actuation nasal spray SHAKE LQ AND U 1 SPR IEN BID 3 Bottle 3    folic acid (FOLVITE) 1 MG tablet folic acid 1 mg tablet   TK 2 TS PO QD      furosemide (LASIX) 20 MG tablet furosemide 20 mg tablet      gabapentin (NEURONTIN) 300 MG capsule TK 1 C PO TID  3    ketoconazole (NIZORAL) 2 % cream ketoconazole 2 % topical cream      leflunomide (ARAVA) 10 MG Tab TAKE 1 TABLET BY MOUTH EVERY DAY WITH MEALS 90 tablet 3    levocetirizine (XYZAL) 5 MG tablet Take 1 tablet (5 mg total) by mouth every evening. 30 tablet 5    mupirocin (BACTROBAN) 2 % ointment mupirocin 2 % topical ointment      pantoprazole (PROTONIX) 40 MG tablet TK 1 T PO QD 90 tablet 3    phendimetrazine tartrate 35 mg Tab TK 1 T PO BID 60 each 2    polyethylene glycol (GLYCOLAX) 17 gram/dose  powder Take 17 g by mouth once daily. 510 g 3    pravastatin (PRAVACHOL) 80 MG tablet TK 1 T PO QHS  3    tiZANidine 4 mg Cap tizanidine 4 mg tablet   TK 1 T PO TID      traMADol (ULTRAM) 50 mg tablet TK 1 T PO Q 12 H PRN 60 tablet 2    triamcinolone acetonide 0.1% (KENALOG) 0.1 % cream triamcinolone acetonide 0.1 % topical ointment      venlafaxine (EFFEXOR-XR) 75 MG 24 hr capsule TK 1 C PO QD 90 capsule 3     No current facility-administered medications for this visit.      Allergies: Patient has no known allergies.   No LMP recorded. Patient is postmenopausal.    ROS:  Feeling well. No dyspnea or chest pain on exertion.  No abdominal pain, change in bowel habits, black or bloody stools.  No urinary tract symptoms. GYN ROS: normal menses, no abnormal bleeding, pelvic pain or discharge, no breast pain or new or enlarging lumps on self exam. No neurological complaints.    OBJECTIVE:   The patient appears well, alert, oriented x 3, in no distress.  /88 (BP Location: Left arm, Patient Position: Sitting, BP Method: Large (Automatic))   Pulse 79   Temp 97.2 °F (36.2 °C)   Wt 89.8 kg (198 lb)   SpO2 99%   BMI 32.95 kg/m²   ENT normal.  Neck supple. No adenopathy or thyromegaly. LUCI. Lungs are clear, good air entry, no wheezes, rhonchi or rales. S1 and S2 normal, no murmurs, regular rate and rhythm. Abdomen soft without tenderness, guarding, mass or organomegaly. Extremities show no edema, normal peripheral pulses. Neurological is normal, no focal findings.    BREAST EXAM: breasts appear normal, no suspicious masses, no skin or nipple changes or axillary nodes    PELVIC EXAM: normal external genitalia, vulva, vagina, cervix, uterus and adnexa    ASSESSMENT:   well woman  Medication refill  Essential hypertension  Makes hyperlipidemia  Class 1 obesity    PLAN:   mammogram  pap smear  Hypertension is chronic and controlled on benazepril and carvedilol.  Hyperlipidemia is chronic and controlled on  fenofibrate and pravastatin.  Weight loss is strongly encouraged through diet and exercise.  She will also be given a prescription for diethylpropion to assist with weight loss efforts as well.

## 2019-10-18 DIAGNOSIS — R92.8 ABNORMAL MAMMOGRAM OF LEFT BREAST: Primary | ICD-10-CM

## 2019-10-18 RX ORDER — DIETHYLPROPION HYDROCHLORIDE 75 MG/1
75 TABLET, EXTENDED RELEASE ORAL DAILY
Qty: 30 TABLET | Refills: 2 | Status: SHIPPED | OUTPATIENT
Start: 2019-10-18 | End: 2020-08-17

## 2019-10-24 ENCOUNTER — TELEPHONE (OUTPATIENT)
Dept: FAMILY MEDICINE | Facility: CLINIC | Age: 49
End: 2019-10-24

## 2019-10-24 DIAGNOSIS — N63.20 LEFT BREAST MASS: Primary | ICD-10-CM

## 2019-10-24 NOTE — TELEPHONE ENCOUNTER
Patient was called and notified of the ultrasound revealing left breast mass and radiology recommended a biopsy and or surgery referral.    The patient is being referred to Dr. Acosta for further evaluation.

## 2019-10-28 ENCOUNTER — OFFICE VISIT (OUTPATIENT)
Dept: SURGERY | Facility: CLINIC | Age: 49
End: 2019-10-28
Payer: MEDICARE

## 2019-10-28 VITALS
SYSTOLIC BLOOD PRESSURE: 124 MMHG | DIASTOLIC BLOOD PRESSURE: 85 MMHG | BODY MASS INDEX: 32.76 KG/M2 | WEIGHT: 196.63 LBS | HEIGHT: 65 IN

## 2019-10-28 DIAGNOSIS — N63.20 LEFT BREAST MASS: ICD-10-CM

## 2019-10-28 DIAGNOSIS — N63.20 MASS OF BREAST, LEFT: Primary | ICD-10-CM

## 2019-10-28 PROCEDURE — 99203 PR OFFICE/OUTPT VISIT, NEW, LEVL III, 30-44 MIN: ICD-10-PCS | Mod: S$GLB,,, | Performed by: SURGERY

## 2019-10-28 PROCEDURE — 99203 OFFICE O/P NEW LOW 30 MIN: CPT | Mod: S$GLB,,, | Performed by: SURGERY

## 2019-10-28 NOTE — PROGRESS NOTES
History & Physical    SUBJECTIVE:     History of Present Illness:    49-year-old  female referred by Dr. Angela Campbell for abnormal mammogram and breast ultrasound.  Patient with no complaints related to her breast.  Last mammogram done 2 years ago is reported by patient as negative. Recent mammogram ultrasound shows a 1.4 cm suspicious mass 10:00 a.m. zone 2 left breast.  Patient cannot palpate any masses in either breast.  The ultrasound also showed a negative axilla.  History of breast cancer in a great aunt.  Patient is menopausal and on no hormonal therapy.  Patient has had 1 child and did not breastfeed.    Chief Complaint   Patient presents with    Breast Problem         Review of patient's allergies indicates:  Review of patient's allergies indicates:  No Known Allergies    Current Outpatient Medications on File Prior to Visit   Medication Sig Dispense Refill    aspirin (ECOTRIN) 81 MG EC tablet Aspir-81 81 mg tablet,delayed release   Take 1 tablet every day by oral route.      benazepril (LOTENSIN) 10 MG tablet TAKE 1 TABLET BY MOUTH EVERY DAY 90 tablet 3    carvedilol (COREG) 25 MG tablet Take 1 tablet (25 mg total) by mouth 2 (two) times daily with meals. 180 tablet 3    diclofenac sodium (VOLTAREN) 1 % Gel Apply 2 g topically 4 (four) times daily. 3 Tube 3    diethylpropion 75 mg TbSR Take 75 mg by mouth once daily. 30 tablet 2    eletriptan (RELPAX) 20 MG tablet Relpax 20 mg tablet   Take 1 tablet once daily as needed      fenofibrate micronized (LOFIBRA) 200 MG Cap Take 1 capsule (200 mg total) by mouth daily with breakfast. 90 capsule 3    fluticasone propionate (FLONASE) 50 mcg/actuation nasal spray SHAKE LQ AND U 1 SPR IEN BID 3 Bottle 3    folic acid (FOLVITE) 1 MG tablet folic acid 1 mg tablet   TK 2 TS PO QD      furosemide (LASIX) 20 MG tablet furosemide 20 mg tablet      gabapentin (NEURONTIN) 300 MG capsule Take 1 capsule (300 mg total) by mouth 3 (three) times  daily. 270 capsule 3    ketoconazole (NIZORAL) 2 % cream ketoconazole 2 % topical cream      leflunomide (ARAVA) 10 MG Tab TAKE 1 TABLET BY MOUTH EVERY DAY WITH MEALS 90 tablet 3    levocetirizine (XYZAL) 5 MG tablet Take 1 tablet (5 mg total) by mouth every evening. 30 tablet 5    mupirocin (BACTROBAN) 2 % ointment mupirocin 2 % topical ointment      pantoprazole (PROTONIX) 40 MG tablet TK 1 T PO QD 90 tablet 3    phendimetrazine tartrate 35 mg Tab TK 1 T PO BID 60 each 2    polyethylene glycol (GLYCOLAX) 17 gram/dose powder Take 17 g by mouth once daily. 510 g 3    pravastatin (PRAVACHOL) 80 MG tablet Take 1 tablet (80 mg total) by mouth every evening. 90 tablet 3    tiZANidine 4 mg Cap tizanidine 4 mg tablet   TK 1 T PO TID      traMADol (ULTRAM) 50 mg tablet TK 1 T PO Q 12 H PRN 60 tablet 2    triamcinolone acetonide 0.1% (KENALOG) 0.1 % cream triamcinolone acetonide 0.1 % topical ointment      venlafaxine (EFFEXOR-XR) 75 MG 24 hr capsule TK 1 C PO QD 90 capsule 3     No current facility-administered medications on file prior to visit.        Past Medical History:   Diagnosis Date    Heart disease     Hyperlipidemia     Hypertension     Kidney disease     Obesity     Rheumatoid arthritis involving multiple sites 2/27/2019    Stroke     Stroke      History reviewed. No pertinent surgical history.  Family History   Problem Relation Age of Onset    Hypertension Mother     Hypertension Father     Hypertension Sister     Lupus Sister     Hypertension Brother        Social History     Socioeconomic History    Marital status: Single     Spouse name: Not on file    Number of children: Not on file    Years of education: Not on file    Highest education level: Not on file   Occupational History    Not on file   Social Needs    Financial resource strain: Not on file    Food insecurity:     Worry: Not on file     Inability: Not on file    Transportation needs:     Medical: Not on file      Non-medical: Not on file   Tobacco Use    Smoking status: Former Smoker    Smokeless tobacco: Never Used   Substance and Sexual Activity    Alcohol use: Not Currently    Drug use: Never    Sexual activity: Not on file   Lifestyle    Physical activity:     Days per week: Not on file     Minutes per session: Not on file    Stress: Not on file   Relationships    Social connections:     Talks on phone: Not on file     Gets together: Not on file     Attends Yarsanism service: Not on file     Active member of club or organization: Not on file     Attends meetings of clubs or organizations: Not on file     Relationship status: Never    Other Topics Concern    Not on file   Social History Narrative    Not on file          Review of Systems   Constitutional: Negative for chills and fever.   Respiratory: Negative for cough and sputum production.    Cardiovascular: Negative for chest pain, palpitations and leg swelling.   Gastrointestinal: Positive for heartburn. Negative for abdominal pain, nausea and vomiting.   Genitourinary: Negative for dysuria and urgency.   Skin: Negative for itching and rash.   Neurological: Negative for dizziness, seizures and weakness.   Endo/Heme/Allergies: Does not bruise/bleed easily.       OBJECTIVE:     Vitals:    10/28/19 1335   BP: 124/85                 Physical Exam:  Physical Exam   Constitutional: She is oriented to person, place, and time and well-developed, well-nourished, and in no distress.   HENT:   Head: Normocephalic and atraumatic.   Eyes: Pupils are equal, round, and reactive to light. EOM are normal.   Neck: Normal range of motion. Neck supple. No thyromegaly present.   Cardiovascular: Normal rate, regular rhythm and normal heart sounds.   Pulmonary/Chest: Effort normal and breath sounds normal.   Bilateral breast and axillary exam or performed. Pendulous breasts bilaterally. No masses palpable in either breast.  No skin changes or nipple discharge noted.  Both  axilla clinically negative   Abdominal: Soft. Bowel sounds are normal.   Musculoskeletal: Normal range of motion. She exhibits no edema.   Neurological: She is alert and oriented to person, place, and time. She has normal reflexes. A cranial nerve deficit is present.   Skin: Skin is warm and dry. No erythema.   Psychiatric: Affect and judgment normal.           ASSESSMENT/PLAN:   1.4 cm left breast mass 10:00 a.m. zone 2 suspicious for malignancy  PLAN:  Ultrasound-guided left breast core biopsy is scheduled ASAP.  If malignant, I did discuss surgical treatment to include lumpectomy and sentinel lymph node biopsy.

## 2019-11-11 ENCOUNTER — OFFICE VISIT (OUTPATIENT)
Dept: RHEUMATOLOGY | Facility: CLINIC | Age: 49
End: 2019-11-11
Payer: MEDICARE

## 2019-11-11 VITALS
WEIGHT: 195 LBS | BODY MASS INDEX: 32.49 KG/M2 | DIASTOLIC BLOOD PRESSURE: 70 MMHG | HEART RATE: 86 BPM | TEMPERATURE: 97 F | OXYGEN SATURATION: 97 % | HEIGHT: 65 IN | SYSTOLIC BLOOD PRESSURE: 110 MMHG | RESPIRATION RATE: 16 BRPM

## 2019-11-11 DIAGNOSIS — M35.00 SJOGREN'S SYNDROME WITHOUT EXTRAGLANDULAR INVOLVEMENT: ICD-10-CM

## 2019-11-11 DIAGNOSIS — I63.9 CEREBROVASCULAR ACCIDENT (CVA), UNSPECIFIED MECHANISM: ICD-10-CM

## 2019-11-11 DIAGNOSIS — M45.9 ANKYLOSING SPONDYLITIS, UNSPECIFIED SITE OF SPINE: ICD-10-CM

## 2019-11-11 DIAGNOSIS — M54.17 LUMBOSACRAL RADICULOPATHY: ICD-10-CM

## 2019-11-11 DIAGNOSIS — M17.9 OSTEOARTHRITIS OF KNEE, UNSPECIFIED LATERALITY, UNSPECIFIED OSTEOARTHRITIS TYPE: ICD-10-CM

## 2019-11-11 DIAGNOSIS — M75.50 BURSITIS OF SHOULDER, UNSPECIFIED LATERALITY: ICD-10-CM

## 2019-11-11 DIAGNOSIS — M70.61 TROCHANTERIC BURSITIS OF BOTH HIPS: ICD-10-CM

## 2019-11-11 DIAGNOSIS — G56.00 CARPAL TUNNEL SYNDROME, UNSPECIFIED LATERALITY: ICD-10-CM

## 2019-11-11 DIAGNOSIS — M54.16 LUMBAR RADICULOPATHY, CHRONIC: ICD-10-CM

## 2019-11-11 DIAGNOSIS — M70.62 TROCHANTERIC BURSITIS OF BOTH HIPS: ICD-10-CM

## 2019-11-11 DIAGNOSIS — Z79.899 HISTORY OF ONGOING TREATMENT WITH HIGH-RISK MEDICATION: ICD-10-CM

## 2019-11-11 DIAGNOSIS — N18.9 CHRONIC RENAL IMPAIRMENT, UNSPECIFIED CKD STAGE: Primary | ICD-10-CM

## 2019-11-11 PROCEDURE — 99214 PR OFFICE/OUTPT VISIT, EST, LEVL IV, 30-39 MIN: ICD-10-PCS | Mod: S$GLB,,, | Performed by: INTERNAL MEDICINE

## 2019-11-11 PROCEDURE — 99214 OFFICE O/P EST MOD 30 MIN: CPT | Mod: S$GLB,,, | Performed by: INTERNAL MEDICINE

## 2019-11-11 NOTE — PROGRESS NOTES
Subjective:       Patient ID: Cindi Magaña is a 49 y.o. female.    Chief Complaint: Follow-up (with lab results)    HPI continue onn Leflunomide 10 mg a day and folic acid 1 mg a day, with no acutely swollen tender pinful inflammed joint, on Gabapentin 300 mg tid has helped lower back and scititica. In [place] had LS epidural but started to hurt agin the lower back kelly. Dryness in eyes still on eyes on Visine.Had last Friday a biopsy  On left breast.. Kness feel stiff with soreness. Shoukshira helped by [employer] . Kidney disease stable according to dr Boucher.        Current medications include:  Current Outpatient Medications on File Prior to Visit   Medication Sig Dispense Refill    aspirin (ECOTRIN) 81 MG EC tablet Aspir-81 81 mg tablet,delayed release   Take 1 tablet every day by oral route.      benazepril (LOTENSIN) 10 MG tablet TAKE 1 TABLET BY MOUTH EVERY DAY 90 tablet 3    carvedilol (COREG) 25 MG tablet Take 1 tablet (25 mg total) by mouth 2 (two) times daily with meals. 180 tablet 3    diclofenac sodium (VOLTAREN) 1 % Gel Apply 2 g topically 4 (four) times daily. 3 Tube 3    diethylpropion 75 mg TbSR Take 75 mg by mouth once daily. 30 tablet 2    eletriptan (RELPAX) 20 MG tablet Relpax 20 mg tablet   Take 1 tablet once daily as needed      fenofibrate micronized (LOFIBRA) 200 MG Cap Take 1 capsule (200 mg total) by mouth daily with breakfast. 90 capsule 3    fluticasone propionate (FLONASE) 50 mcg/actuation nasal spray SHAKE LQ AND U 1 SPR IEN BID 3 Bottle 3    folic acid (FOLVITE) 1 MG tablet folic acid 1 mg tablet   TK 2 TS PO QD      furosemide (LASIX) 20 MG tablet furosemide 20 mg tablet      gabapentin (NEURONTIN) 300 MG capsule Take 1 capsule (300 mg total) by mouth 3 (three) times daily. 270 capsule 3    ketoconazole (NIZORAL) 2 % cream ketoconazole 2 % topical cream      leflunomide (ARAVA) 10 MG Tab TAKE 1 TABLET BY MOUTH EVERY DAY WITH MEALS 90 tablet 3    levocetirizine (XYZAL)  "5 MG tablet Take 1 tablet (5 mg total) by mouth every evening. 30 tablet 5    mupirocin (BACTROBAN) 2 % ointment mupirocin 2 % topical ointment      pantoprazole (PROTONIX) 40 MG tablet TK 1 T PO QD 90 tablet 3    phendimetrazine tartrate 35 mg Tab TK 1 T PO BID 60 each 2    polyethylene glycol (GLYCOLAX) 17 gram/dose powder Take 17 g by mouth once daily. 510 g 3    pravastatin (PRAVACHOL) 80 MG tablet Take 1 tablet (80 mg total) by mouth every evening. 90 tablet 3    tiZANidine 4 mg Cap tizanidine 4 mg tablet   TK 1 T PO TID      traMADol (ULTRAM) 50 mg tablet TK 1 T PO Q 12 H PRN 60 tablet 2    triamcinolone acetonide 0.1% (KENALOG) 0.1 % cream triamcinolone acetonide 0.1 % topical ointment      venlafaxine (EFFEXOR-XR) 75 MG 24 hr capsule TK 1 C PO QD 90 capsule 3     No current facility-administered medications on file prior to visit.        Lab Results:  No results found for: WBC, RBC, HGB, HCT, MCV, COLORU, SPECGRAV, PHUR, WBCUR, NITRITE, GLUCOSEUR, KETONESU, BILIRUBINUR, RBCUR, NA, K, CL, CO2, GLU, BUN, CREATININE     Review of Systems   Constitutional: Positive for diaphoresis.   HENT:        Dryness in eyes and mouth   Eyes: Negative.    Respiratory: Positive for cough.    Cardiovascular: Negative.    Gastrointestinal: Positive for nausea.   Endocrine: Negative.    Genitourinary: Positive for difficulty urinating.   Musculoskeletal: Positive for arthralgias and back pain.   Allergic/Immunologic: Positive for environmental allergies.   Neurological:        Burning in feet and numbness inn hands   Hematological: Bruises/bleeds easily.   Psychiatric/Behavioral: Negative.          Objective:   /70 (BP Location: Right arm, Patient Position: Sitting, BP Method: Medium (Manual))   Pulse 86   Temp 96.9 °F (36.1 °C) (Tympanic)   Resp 16   Ht 5' 5" (1.651 m)   Wt 88.5 kg (195 lb)   SpO2 97%   BMI 32.45 kg/m²      Physical Exam   Constitutional: She is well-developed, well-nourished, and in no " distress.   HENT:   No dryness in eyes and mouth   Eyes: Conjunctivae and EOM are normal. Pupils are equal, round, and reactive to light.   Neck: Normal range of motion. Neck supple.   Cardiovascular: Normal rate, regular rhythm and normal heart sounds.    Pulmonary/Chest: Effort normal and breath sounds normal.   Abdominal: Soft.   Neurological:   SLR=rt positive   Skin: Skin is warm and dry.     Psychiatric: Mood, memory, affect and judgment normal.   Musculoskeletal:   SDLR posiive on rt tender rt shoulder, rt knee with  swelling on rt ankle           Assessment:       1. Ankylosing spondylitis, unspecified site of spine    2. Sjogren's syndrome without extraglandular involvement    3. Chronic renal impairment, unspecified CKD stage    4. Bursitis of shoulder, unspecified laterality    5. Osteoarthritis of knee, unspecified laterality, unspecified osteoarthritis type    6. Trochanteric bursitis of both hips    7. Lumbosacral radiculopathy    8. Carpal tunnel syndrome, unspecified laterality    9. Cerebrovascular accident (CVA), unspecified mechanism    10. Lumbar radiculopathy, chronic            Plan:       Will contionue with current medications and dosis.

## 2019-11-14 DIAGNOSIS — C50.912 MALIGNANT NEOPLASM OF LEFT BREAST IN FEMALE, ESTROGEN RECEPTOR POSITIVE, UNSPECIFIED SITE OF BREAST: Primary | ICD-10-CM

## 2019-11-14 DIAGNOSIS — Z17.0 MALIGNANT NEOPLASM OF LEFT BREAST IN FEMALE, ESTROGEN RECEPTOR POSITIVE, UNSPECIFIED SITE OF BREAST: Primary | ICD-10-CM

## 2019-11-21 DIAGNOSIS — C50.912 MALIGNANT NEOPLASM OF LEFT BREAST IN FEMALE, ESTROGEN RECEPTOR POSITIVE, UNSPECIFIED SITE OF BREAST: ICD-10-CM

## 2019-11-21 DIAGNOSIS — Z17.0 MALIGNANT NEOPLASM OF LEFT BREAST IN FEMALE, ESTROGEN RECEPTOR POSITIVE, UNSPECIFIED SITE OF BREAST: ICD-10-CM

## 2019-11-21 DIAGNOSIS — N63.20 MASS OF BREAST, LEFT: Primary | ICD-10-CM

## 2019-11-21 LAB
BASOPHILS NFR SNV MANUAL: 0.8 % (ref 0–3)
BUN SERPL-MCNC: 32 MG/DL (ref 7–18)
BUN/CREAT SERPL: 20.64 RATIO (ref 7–18)
CALCIUM SERPL-MCNC: 9.4 MG/DL (ref 8.8–10.5)
CHLORIDE SERPL-SCNC: 107 MMOL/L (ref 100–108)
CO2 SERPL-SCNC: 27 MMOL/L (ref 21–32)
CREAT SERPL-MCNC: 1.55 MG/DL (ref 0.55–1.02)
EOSINOPHIL NFR SNV MANUAL: 1.4 % (ref 1–3)
ERYTHROCYTE [DISTWIDTH] IN BLOOD BY AUTOMATED COUNT: 13.6 % (ref 12.5–18)
GFR ESTIMATION: 43
GLUCOSE SERPL-MCNC: 105 MG/DL (ref 70–110)
HCT VFR BLD AUTO: 38.9 % (ref 37–47)
HGB BLD-MCNC: 12.3 G/DL (ref 12–16)
LYMPHOCYTES NFR SNV MANUAL: 23.9 % (ref 25–40)
MANUAL NRBC PER 100 CELLS: 0 %
MCH RBC QN AUTO: 27.8 PG (ref 27–31.2)
MCHC RBC AUTO-ENTMCNC: 31.6 G/DL (ref 31.8–35.4)
MCV RBC AUTO: 87.8 FL (ref 80–97)
MONOCYTES/100 LEUKOCYTES: 9.2 % (ref 1–15)
NEUTROPHILS NFR BLD: 4.18 10*3/UL (ref 1.8–7.7)
NEUTROPHILS NFR SNV MANUAL: 64.4 % (ref 37–80)
PLATELETS: 222 10*3/UL (ref 142–424)
POTASSIUM SERPL-SCNC: 5.5 MMOL/L (ref 3.6–5.2)
RBC # BLD AUTO: 4.43 10*6/UL (ref 4.2–5.4)
SODIUM BLD-SCNC: 140 MMOL/L (ref 135–145)
WBC # BLD: 6.5 10*3/UL (ref 4.6–10.2)

## 2019-12-02 DIAGNOSIS — M54.16 LUMBAR RADICULOPATHY, CHRONIC: Primary | ICD-10-CM

## 2019-12-02 RX ORDER — TRAMADOL HYDROCHLORIDE 50 MG/1
TABLET ORAL
Qty: 60 TABLET | Refills: 2 | Status: SHIPPED | OUTPATIENT
Start: 2019-12-02 | End: 2020-08-17

## 2020-01-21 RX ORDER — TIZANIDINE HYDROCHLORIDE 4 MG/1
CAPSULE, GELATIN COATED ORAL
Qty: 270 CAPSULE | Refills: 3 | Status: SHIPPED | OUTPATIENT
Start: 2020-01-21 | End: 2020-01-22 | Stop reason: SDUPTHER

## 2020-01-22 DIAGNOSIS — M54.17 LUMBOSACRAL RADICULOPATHY: Primary | ICD-10-CM

## 2020-01-22 DIAGNOSIS — M45.9 ANKYLOSING SPONDYLITIS, UNSPECIFIED SITE OF SPINE: ICD-10-CM

## 2020-01-22 RX ORDER — TIZANIDINE HYDROCHLORIDE 4 MG/1
CAPSULE, GELATIN COATED ORAL
Qty: 270 CAPSULE | Refills: 3 | Status: SHIPPED | OUTPATIENT
Start: 2020-01-22 | End: 2020-01-22 | Stop reason: SDUPTHER

## 2020-01-22 RX ORDER — TIZANIDINE HYDROCHLORIDE 4 MG/1
4 CAPSULE, GELATIN COATED ORAL 3 TIMES DAILY
Qty: 270 CAPSULE | Refills: 1 | Status: SHIPPED | OUTPATIENT
Start: 2020-01-22 | End: 2020-07-20

## 2020-01-22 NOTE — TELEPHONE ENCOUNTER
----- Message from Carolin Dyson sent at 1/22/2020 11:28 AM CST -----  Refill request    tiZANidine 4 mg Cap    [facility] [address]

## 2020-01-23 RX ORDER — GABAPENTIN 300 MG/1
300 CAPSULE ORAL 3 TIMES DAILY
Qty: 270 CAPSULE | Refills: 3 | Status: SHIPPED | OUTPATIENT
Start: 2020-01-23 | End: 2020-11-17

## 2020-01-27 ENCOUNTER — OFFICE VISIT (OUTPATIENT)
Dept: FAMILY MEDICINE | Facility: CLINIC | Age: 50
End: 2020-01-27
Payer: MEDICARE

## 2020-01-27 VITALS
HEART RATE: 76 BPM | DIASTOLIC BLOOD PRESSURE: 101 MMHG | WEIGHT: 197.25 LBS | OXYGEN SATURATION: 97 % | TEMPERATURE: 97 F | HEIGHT: 65 IN | BODY MASS INDEX: 32.86 KG/M2 | SYSTOLIC BLOOD PRESSURE: 143 MMHG

## 2020-01-27 DIAGNOSIS — M54.17 LUMBOSACRAL RADICULOPATHY: ICD-10-CM

## 2020-01-27 DIAGNOSIS — E78.2 MIXED HYPERLIPIDEMIA: ICD-10-CM

## 2020-01-27 DIAGNOSIS — I10 ESSENTIAL HYPERTENSION: Primary | ICD-10-CM

## 2020-01-27 DIAGNOSIS — C50.912 INFILTRATING DUCTAL CARCINOMA OF LEFT BREAST, STAGE 2: ICD-10-CM

## 2020-01-27 PROCEDURE — 99213 OFFICE O/P EST LOW 20 MIN: CPT | Mod: S$GLB,,, | Performed by: FAMILY MEDICINE

## 2020-01-27 PROCEDURE — 99213 PR OFFICE/OUTPT VISIT, EST, LEVL III, 20-29 MIN: ICD-10-PCS | Mod: S$GLB,,, | Performed by: FAMILY MEDICINE

## 2020-01-27 NOTE — PROGRESS NOTES
Subjective:      Patient ID: Cindi Magaña is a 49 y.o. female.    Chief Complaint: Follow-up and Hypertension      Cindi Magaña is a 49 y.o. female here for follow up of dyslipidemia. The patient does use medications that may worsen dyslipidemias (corticosteroids, progestins, anabolic steroids, diuretics, beta-blockers, amiodarone, cyclosporine, olanzapine). The patient exercises infrequently.  The patient is not known to have coexisting coronary artery disease.     Cardiac Risk Factors  Age > 45-male, > 55-female:  NO  Smoking:   NO  Sig. family hx of CHD*:  NO  Hypertension:   YES  +1  Diabetes:   NO  HDL < 35:   YES  +1  HDL > 59:   NO  Total: 2    *- Sig. family h/o CHD per NCEP = MI or sudden death at <56yo in   father or other 1st-degree male relative, or <64yo in mother or   other 1st-degree female relative    Hypertension: Patient here for follow-up of elevated blood pressure. She is not exercising and is adherent to low salt diet.  Blood pressure is well controlled at home. Cardiac symptoms none. Patient denies chest pain, dyspnea, palpitations and syncope.  Cardiovascular risk factors: dyslipidemia, hypertension, obesity (BMI >= 30 kg/m2) and sedentary lifestyle. Use of agents associated with hypertension: none. History of target organ damage: chronic kidney disease.    She was recently diagnosed with breast cancer of the left breast and is going to [facility] in [place] for treatment.  She states they told her she has scoliosis and cysts of her kidneys. She is requesting a script for Biofreeze to use for pain.  She also has been diagnosed with RA and is followed by rheumatology. She also has a nodule in the left lung as well that she will have a biopsy on this Friday. She will also have an exercise stress test as well.    Review of Systems   Constitutional: Negative for fever.   HENT: Negative for ear pain, postnasal drip, rhinorrhea, sinus pain and sore throat.    Eyes: Negative for redness.    Respiratory: Negative for cough, chest tightness, shortness of breath and wheezing.    Cardiovascular: Negative for chest pain, palpitations and leg swelling.   Gastrointestinal: Negative for constipation, diarrhea, nausea and vomiting.   Genitourinary: Negative for difficulty urinating and dysuria.   Musculoskeletal: Positive for arthralgias and back pain.   Skin: Negative for rash.   Neurological: Negative for dizziness.   Psychiatric/Behavioral: The patient is nervous/anxious.      Medication List with Changes/Refills   Current Medications    ASPIRIN (ECOTRIN) 81 MG EC TABLET    Aspir-81 81 mg tablet,delayed release   Take 1 tablet every day by oral route.    BENAZEPRIL (LOTENSIN) 10 MG TABLET    TAKE 1 TABLET BY MOUTH EVERY DAY    CARVEDILOL (COREG) 25 MG TABLET    Take 1 tablet (25 mg total) by mouth 2 (two) times daily with meals.    DICLOFENAC SODIUM (VOLTAREN) 1 % GEL    Apply 2 g topically 4 (four) times daily.    DIETHYLPROPION 75 MG TBSR    Take 75 mg by mouth once daily.    ELETRIPTAN (RELPAX) 20 MG TABLET    Relpax 20 mg tablet   Take 1 tablet once daily as needed    FENOFIBRATE MICRONIZED (LOFIBRA) 200 MG CAP    Take 1 capsule (200 mg total) by mouth daily with breakfast.    FLUTICASONE PROPIONATE (FLONASE) 50 MCG/ACTUATION NASAL SPRAY    SHAKE LQ AND U 1 SPR IEN BID    FOLIC ACID (FOLVITE) 1 MG TABLET    folic acid 1 mg tablet   TK 2 TS PO QD    FUROSEMIDE (LASIX) 20 MG TABLET    furosemide 20 mg tablet    GABAPENTIN (NEURONTIN) 300 MG CAPSULE    Take 1 capsule (300 mg total) by mouth 3 (three) times daily.    KETOCONAZOLE (NIZORAL) 2 % CREAM    ketoconazole 2 % topical cream    LEFLUNOMIDE (ARAVA) 10 MG TAB    TAKE 1 TABLET BY MOUTH EVERY DAY WITH MEALS    LEVOCETIRIZINE (XYZAL) 5 MG TABLET    Take 1 tablet (5 mg total) by mouth every evening.    MUPIROCIN (BACTROBAN) 2 % OINTMENT    mupirocin 2 % topical ointment    PANTOPRAZOLE (PROTONIX) 40 MG TABLET    TK 1 T PO QD    PHENDIMETRAZINE TARTRATE  "35 MG TAB    TK 1 T PO BID    POLYETHYLENE GLYCOL (GLYCOLAX) 17 GRAM/DOSE POWDER    Take 17 g by mouth once daily.    PRAVASTATIN (PRAVACHOL) 80 MG TABLET    Take 1 tablet (80 mg total) by mouth every evening.    TIZANIDINE 4 MG CAP    Take 4 mg by mouth 3 (three) times daily.    TRAMADOL (ULTRAM) 50 MG TABLET    TK 1 T PO Q 12 H PRN    TRIAMCINOLONE ACETONIDE 0.1% (KENALOG) 0.1 % CREAM    triamcinolone acetonide 0.1 % topical ointment    VENLAFAXINE (EFFEXOR-XR) 75 MG 24 HR CAPSULE    TK 1 C PO QD      Objective:   BP (!) 143/101 (BP Location: Left arm, Patient Position: Sitting, BP Method: Medium (Automatic))   Pulse 76   Temp 97.2 °F (36.2 °C)   Ht 5' 5" (1.651 m)   Wt 89.5 kg (197 lb 4 oz)   SpO2 97%   BMI 32.82 kg/m²    Estimated body mass index is 32.82 kg/m² as calculated from the following:    Height as of this encounter: 5' 5" (1.651 m).    Weight as of this encounter: 89.5 kg (197 lb 4 oz).   Physical Exam   Constitutional: She is oriented to person, place, and time. Vital signs are normal. She appears well-developed and well-nourished.   HENT:   Head: Normocephalic and atraumatic.   Right Ear: Hearing and tympanic membrane normal.   Left Ear: Hearing and tympanic membrane normal.   Nose: Nose normal.   Mouth/Throat: Uvula is midline, oropharynx is clear and moist and mucous membranes are normal.   Eyes: Pupils are equal, round, and reactive to light. Conjunctivae and EOM are normal.   Neck: Normal range of motion. Neck supple.   Cardiovascular: Normal rate, regular rhythm and normal heart sounds.   Pulmonary/Chest: Effort normal and breath sounds normal. She has no wheezes. She has no rales.   Abdominal: Soft. Bowel sounds are normal. She exhibits no distension and no mass. There is no tenderness. There is no guarding.   Musculoskeletal: Normal range of motion. She exhibits no edema or tenderness.   Neurological: She is alert and oriented to person, place, and time. No cranial nerve deficit.   Skin: " Skin is warm and dry. No rash noted. No erythema.   Psychiatric: She has a normal mood and affect. Her speech is normal and behavior is normal. Judgment and thought content normal. Cognition and memory are normal.   Nursing note and vitals reviewed.    Lab Results   Component Value Date    WBC 6.5 11/21/2019    HGB 12.3 11/21/2019    HCT 38.9 11/21/2019     11/21/2019    K 5.5 (H) 11/21/2019     11/21/2019    CREATININE 1.55 (H) 11/21/2019    BUN 32 (H) 11/21/2019    CO2 27 11/21/2019      Assessment:      Problem List Items Addressed This Visit        Neuro    Lumbosacral radiculopathy    Relevant Medications    menthol (BIOFREEZE, MENTHOL,) 4 % Gel       Cardiac/Vascular    Essential hypertension - Primary    Hyperlipidemia       Oncology    Infiltrating ductal carcinoma of left breast, stage 2           Plan:   Hypertension is chronic and not well controlled today but she has been with anxiety since the cancer diagnosis and her blood pressure has usually been good.  She will continue current medication and to monitor BP regularly.  Hyperlipidemia is chronic and controlled on pravastatin.  Biofreeze to be applied to lower back for pain as needed.  Continue follow up with [facility] for breast cancer treatment.

## 2020-02-11 ENCOUNTER — OFFICE VISIT (OUTPATIENT)
Dept: RHEUMATOLOGY | Facility: CLINIC | Age: 50
End: 2020-02-11
Payer: MEDICARE

## 2020-02-11 VITALS
WEIGHT: 201.38 LBS | SYSTOLIC BLOOD PRESSURE: 123 MMHG | BODY MASS INDEX: 33.55 KG/M2 | DIASTOLIC BLOOD PRESSURE: 91 MMHG | OXYGEN SATURATION: 97 % | HEIGHT: 65 IN | TEMPERATURE: 97 F | HEART RATE: 75 BPM | RESPIRATION RATE: 16 BRPM

## 2020-02-11 DIAGNOSIS — M45.9 ANKYLOSING SPONDYLITIS, UNSPECIFIED SITE OF SPINE: ICD-10-CM

## 2020-02-11 DIAGNOSIS — M70.62 TROCHANTERIC BURSITIS OF BOTH HIPS: ICD-10-CM

## 2020-02-11 DIAGNOSIS — M35.00 SJOGREN'S SYNDROME WITHOUT EXTRAGLANDULAR INVOLVEMENT: ICD-10-CM

## 2020-02-11 DIAGNOSIS — R80.9 PROTEINURIA, UNSPECIFIED TYPE: ICD-10-CM

## 2020-02-11 DIAGNOSIS — I63.9 CEREBROVASCULAR ACCIDENT (CVA), UNSPECIFIED MECHANISM: ICD-10-CM

## 2020-02-11 DIAGNOSIS — R94.4 ABNORMAL CREATININE CLEARANCE GLOMERULAR FILTRATION: ICD-10-CM

## 2020-02-11 DIAGNOSIS — M70.61 TROCHANTERIC BURSITIS OF BOTH HIPS: ICD-10-CM

## 2020-02-11 DIAGNOSIS — M06.9 RHEUMATOID ARTHRITIS INVOLVING MULTIPLE SITES, UNSPECIFIED RHEUMATOID FACTOR PRESENCE: ICD-10-CM

## 2020-02-11 DIAGNOSIS — M54.16 LUMBAR RADICULOPATHY, CHRONIC: Primary | ICD-10-CM

## 2020-02-11 PROCEDURE — 99214 PR OFFICE/OUTPT VISIT, EST, LEVL IV, 30-39 MIN: ICD-10-PCS | Mod: S$GLB,,, | Performed by: INTERNAL MEDICINE

## 2020-02-11 PROCEDURE — 99214 OFFICE O/P EST MOD 30 MIN: CPT | Mod: S$GLB,,, | Performed by: INTERNAL MEDICINE

## 2020-02-11 RX ORDER — HYDROCODONE BITARTRATE AND ACETAMINOPHEN 10; 325 MG/1; MG/1
1 TABLET ORAL EVERY 6 HOURS PRN
Qty: 60 TABLET | Refills: 0 | Status: SHIPPED | OUTPATIENT
Start: 2020-02-11 | End: 2020-08-17

## 2020-02-11 NOTE — PROGRESS NOTES
Subjective:       Patient ID: Cindi Magaña is a 49 y.o. female.    Chief Complaint: Follow-up    HPI continue on Gabapentin 300 m,g capsules on 2 caps in am and 2 caps pm and helping the l;ower back and radiate in the legs . Dryness in eyes using ATD  And dry mouth helped by Biotene. Continue on leflunomide 10 mg daily and folic acid  And  No axcuitely swollen tender painful joint. AM stiffness upon awakening  And  Fatigue onset 2 hours after waking up. [facility]en at [facility] for Left Mastectomy next week. Told tohold Leflunomide 1 week befpre and after surgeery         Current medications include:  Current Outpatient Medications on File Prior to Visit   Medication Sig Dispense Refill    aspirin (ECOTRIN) 81 MG EC tablet Aspir-81 81 mg tablet,delayed release   Take 1 tablet every day by oral route.      benazepril (LOTENSIN) 10 MG tablet TAKE 1 TABLET BY MOUTH EVERY DAY 90 tablet 3    carvedilol (COREG) 25 MG tablet Take 1 tablet (25 mg total) by mouth 2 (two) times daily with meals. 180 tablet 3    diclofenac sodium (VOLTAREN) 1 % Gel Apply 2 g topically 4 (four) times daily. (Patient not taking: Reported on 1/27/2020) 3 Tube 3    diethylpropion 75 mg TbSR Take 75 mg by mouth once daily. (Patient not taking: Reported on 1/27/2020) 30 tablet 2    eletriptan (RELPAX) 20 MG tablet Relpax 20 mg tablet   Take 1 tablet once daily as needed      fenofibrate micronized (LOFIBRA) 200 MG Cap Take 1 capsule (200 mg total) by mouth daily with breakfast. 90 capsule 3    fluticasone propionate (FLONASE) 50 mcg/actuation nasal spray SHAKE LQ AND U 1 SPR IEN BID 3 Bottle 3    folic acid (FOLVITE) 1 MG tablet folic acid 1 mg tablet   TK 2 TS PO QD      furosemide (LASIX) 20 MG tablet furosemide 20 mg tablet      gabapentin (NEURONTIN) 300 MG capsule Take 1 capsule (300 mg total) by mouth 3 (three) times daily. 270 capsule 3    ketoconazole (NIZORAL) 2 % cream ketoconazole 2 % topical cream      leflunomide (ARAVA)  10 MG Tab TAKE 1 TABLET BY MOUTH EVERY DAY WITH MEALS (Patient not taking: Reported on 1/27/2020) 90 tablet 3    levocetirizine (XYZAL) 5 MG tablet Take 1 tablet (5 mg total) by mouth every evening. 30 tablet 5    menthol (BIOFREEZE, MENTHOL,) 4 % Gel Apply to affected area of skin q 6 hours prn pain 1 Tube 3    mupirocin (BACTROBAN) 2 % ointment mupirocin 2 % topical ointment      pantoprazole (PROTONIX) 40 MG tablet TK 1 T PO QD 90 tablet 3    phendimetrazine tartrate 35 mg Tab TK 1 T PO BID (Patient not taking: Reported on 1/27/2020) 60 each 2    polyethylene glycol (GLYCOLAX) 17 gram/dose powder Take 17 g by mouth once daily. (Patient not taking: Reported on 1/27/2020) 510 g 3    pravastatin (PRAVACHOL) 80 MG tablet Take 1 tablet (80 mg total) by mouth every evening. 90 tablet 3    tiZANidine 4 mg Cap Take 4 mg by mouth 3 (three) times daily. 270 capsule 1    traMADol (ULTRAM) 50 mg tablet TK 1 T PO Q 12 H PRN 60 tablet 2    triamcinolone acetonide 0.1% (KENALOG) 0.1 % cream triamcinolone acetonide 0.1 % topical ointment      venlafaxine (EFFEXOR-XR) 75 MG 24 hr capsule TK 1 C PO QD 90 capsule 3     No current facility-administered medications on file prior to visit.        Lab Results:  WBC   Date Value Ref Range Status   11/21/2019 6.5 4.6 - 10.2 10*3/uL Final     Hemoglobin   Date Value Ref Range Status   11/21/2019 12.3 12.0 - 16.0 g/dL Final     Hematocrit   Date Value Ref Range Status   11/21/2019 38.9 37.0 - 47.0 % Final     Sodium   Date Value Ref Range Status   11/21/2019 140 135 - 145 mmol/L Final     Potassium   Date Value Ref Range Status   11/21/2019 5.5 (H) 3.6 - 5.2 mmol/L Final     Chloride   Date Value Ref Range Status   11/21/2019 107 100 - 108 mmol/L Final     CO2   Date Value Ref Range Status   11/21/2019 27 21 - 32 mmol/L Final     Glucose   Date Value Ref Range Status   11/21/2019 105 70 - 110 mg/dL Final     BUN, Bld   Date Value Ref Range Status   11/21/2019 32 (H) 7 - 18 mg/dL  "Final     Creatinine   Date Value Ref Range Status   11/21/2019 1.55 (H) 0.55 - 1.02 mg/dL Final        Review of Systems   Constitutional: Positive for diaphoresis.   HENT:        Dryness in eyes and mouth   Eyes: Negative.    Respiratory: Negative.    Cardiovascular: Negative.    Gastrointestinal: Positive for abdominal pain and nausea.   Endocrine:        Borderline diabetic   Genitourinary:        Incontinence and frequency   Musculoskeletal: Positive for arthralgias and back pain.   Allergic/Immunologic: Positive for environmental allergies.   Neurological:        Numbness  And tingling in feet and legs   Hematological: Bruises/bleeds easily.   Psychiatric/Behavioral: Negative.          Objective:   BP (!) 123/91 (BP Location: Right arm, Patient Position: Sitting, BP Method: Medium (Automatic))   Pulse 75   Temp 96.6 °F (35.9 °C) (Temporal)   Resp 16   Ht 5' 5" (1.651 m)   Wt 91.4 kg (201 lb 6.4 oz)   SpO2 97%   BMI 33.51 kg/m²      Physical Exam   Constitutional: She is oriented to person, place, and time and well-developed, well-nourished, and in no distress.   HENT:   Head: Normocephalic and atraumatic.   Right Ear: External ear normal.   Left Ear: External ear normal.   No drynessi n eyes a nd mouth   Eyes: Conjunctivae are normal. Pupils are equal, round, and reactive to light.   Neck: Normal range of motion.   Cardiovascular: Normal rate, regular rhythm and normal heart sounds.    Pulmonary/Chest: Effort normal and breath sounds normal.   Neurological: She is alert and oriented to person, place, and time. Gait normal.   SLR=ouisitiv on the rt.   Skin: Skin is warm and dry.     Psychiatric: Mood, memory, affect and judgment normal.   Musculoskeletal:   Bilateral shoulder SA bursitis and both hip greater trochanter bursitis            Assessment:       1. Rheumatoid arthritis involving multiple sites, unspecified rheumatoid factor presence    2. Ankylosing spondylitis, unspecified site of spine    3. " Trochanteric bursitis of both hips    4. Sjogren's syndrome without extraglandular involvement    5. Abnormal creatinine clearance glomerular filtration    6. Proteinuria, unspecified type    7. Lumbar radiculopathy, chronic    8. Cerebrovascular accident (CVA), unspecified mechanism            Plan:        schedule for total mastectomy next week.

## 2020-04-01 ENCOUNTER — TELEPHONE (OUTPATIENT)
Dept: FAMILY MEDICINE | Facility: CLINIC | Age: 50
End: 2020-04-01

## 2020-04-01 DIAGNOSIS — C50.912 INFILTRATING DUCTAL CARCINOMA OF LEFT BREAST, STAGE 2: Primary | ICD-10-CM

## 2020-04-01 NOTE — TELEPHONE ENCOUNTER
----- Message from Carolin Dyson sent at 4/1/2020 11:06 AM CDT -----  Referral  To Dr Clements for breast drainage removal.

## 2020-04-02 ENCOUNTER — OFFICE VISIT (OUTPATIENT)
Dept: SURGERY | Facility: CLINIC | Age: 50
End: 2020-04-02
Payer: MEDICARE

## 2020-04-02 DIAGNOSIS — C50.912 INFILTRATING DUCTAL CARCINOMA OF LEFT BREAST, STAGE 2: ICD-10-CM

## 2020-04-02 DIAGNOSIS — Z48.89 POSTOPERATIVE VISIT: Primary | ICD-10-CM

## 2020-04-02 PROCEDURE — 99024 PR POST-OP FOLLOW-UP VISIT: ICD-10-PCS | Mod: S$GLB,POP,, | Performed by: SURGERY

## 2020-04-02 PROCEDURE — 99024 POSTOP FOLLOW-UP VISIT: CPT | Mod: S$GLB,POP,, | Performed by: SURGERY

## 2020-04-02 NOTE — LETTER
April 2, 2020      Angela Campbell MD  [address], [address]  [facility] [address]           [place] - General Surgery  [address]  [facility] [address]  Phone: [phone]  Fax: [phone]          Patient: Cindi Magaña   MR Number: [id number]   YOB: 1970   Date of Visit: 4/2/2020       Dear Dr. Angela Campbell:    Thank you for referring Cindi Magaña to me for evaluation. Attached you will find relevant portions of my assessment and plan of care.    If you have questions, please do not hesitate to call me. I look forward to following Cindi Magaña along with you.    Sincerely,    Flynn Clements MD    Enclosure  CC:  No Recipients    If you would like to receive this communication electronically, please contact [email or web address] or [phone] to request more information on [facility] access.    For providers and/or their staff who would like to refer a patient to [facility], please contact us through our one-stop-shop provider referral line, [facility] [place], at [phone].    If you feel you have received this communication in error or would no longer like to receive these types of communications, please e-mail [email or web address]

## 2020-04-02 NOTE — PROGRESS NOTES
Subjective:       Patient ID: Cindi Magaña is a 49 y.o. female.    Chief Complaint: Post-op Evaluation (surgery done at [facility])    HPI:  Patient is seen at the request of Dr. Angela Campbell for removal of the drain following recent surgery at [facility].  She initially had a modified radical mastectomy and tissue expander reconstruction.  She developed some skin necrosis and had to have a revisional procedure on the expander and has had a drain in place for a few weeks.  By her history she has had no output for the last 3-4 days.  Due to the COVID-19 pandemic, if she does go to [place] from [place] she will have to self-quarantine there for 14 days.    Review of Systems    Objective:      Physical Exam:  Drain in place left lateral chest wall with minimal fluid in the bulb.  Mild erythema at the drain site.  Drain was removed and she had drainage of some murky fluid through the drain site, probably 25-30 mL.  Culture was taken of this fluid.  Assessment:       Postop revisional procedure following left breast reconstruction with a tissue expander   Plan:       She will be notified of the results of the culture when available.  She can call if any other issues arise prior to her next follow-up appointment at [facility].

## 2020-04-03 RX ORDER — BENAZEPRIL HYDROCHLORIDE 10 MG/1
10 TABLET ORAL DAILY
Qty: 90 TABLET | Refills: 3 | Status: SHIPPED | OUTPATIENT
Start: 2020-04-03 | End: 2020-08-17 | Stop reason: SDUPTHER

## 2020-04-04 LAB — CULTURE: NORMAL

## 2020-04-07 ENCOUNTER — TELEPHONE (OUTPATIENT)
Dept: SURGERY | Facility: CLINIC | Age: 50
End: 2020-04-07

## 2020-04-07 NOTE — TELEPHONE ENCOUNTER
Patient was notified that the culture of the fluid that drained following drain removal from the left breast surgery last week did grow Pseudomonas aeruginosa, described as a light growth.  She was told that this information will be sent to her plastic surgeon at [facility], Dr. Mat Valle, who will determine if antibiotic therapy is indicated.  She did indicate to me that she had no pain, skin erythema, and that the drain site had closed.

## 2020-04-14 ENCOUNTER — OFFICE VISIT (OUTPATIENT)
Dept: SURGERY | Facility: CLINIC | Age: 50
End: 2020-04-14
Payer: MEDICARE

## 2020-04-14 DIAGNOSIS — Z48.89 POSTOPERATIVE VISIT: Primary | ICD-10-CM

## 2020-04-14 PROCEDURE — 99024 PR POST-OP FOLLOW-UP VISIT: ICD-10-PCS | Mod: S$GLB,POP,, | Performed by: SURGERY

## 2020-04-14 PROCEDURE — 99024 POSTOP FOLLOW-UP VISIT: CPT | Mod: S$GLB,POP,, | Performed by: SURGERY

## 2020-04-14 NOTE — PROGRESS NOTES
Subjective:       Patient ID: Cindi Magaña is a 49 y.o. female.    Chief Complaint: Drainage from (L) mastectomy site    HPI:  Patient called and requested a reexamination.  I had notified her last week that the culture would take time of drain removal grew Pseudomonas aeruginosa and she was subsequently placed on Cipro and minocycline by her plastic surgeon at [facility].  However she has continued to have drainage and is now beginning to have some breakdown of the chest wall skin.  It appears she is tentatively scheduled for chest wall debridement tissue expander removal Thursday 04/16/2020 in [place].  She comes in asking if there is anything else she can do.  She also says she does not understand why I keep getting these infections.    Review of Systems    Objective:      Physical Exam:  The left chest wall is basically a large fluctuant mass with erythema and areas of impending breakdown of the skin.  The skin appears thinned out over most of the breast.  I did not try to reproduce any drainage today.  It does appear that the prior drain site on the lateral lower chest wall has closed.    Assessment:       Postmastectomy left chest wall infection involving tissue expander  Plan:       Patient was told I see no alternative to explore this area and removing the tissue expander.  She was also told that I do not do this particular procedure, as I am not a plastic surgeon, and it was imperative that she go to [place] as scheduled and have this problem addressed.

## 2020-05-14 ENCOUNTER — PATIENT OUTREACH (OUTPATIENT)
Dept: ADMINISTRATIVE | Facility: HOSPITAL | Age: 50
End: 2020-05-14

## 2020-05-14 NOTE — PROGRESS NOTES
Chart review, no results found in legacy documents or [facility]. Mammogram and Left breast ultrasound with biopsy, clips, biopsy scanned into chart from legacy documents and sent to LPN-[facility].

## 2020-08-17 ENCOUNTER — OFFICE VISIT (OUTPATIENT)
Dept: INTERNAL MEDICINE | Facility: CLINIC | Age: 50
End: 2020-08-17
Payer: MEDICARE

## 2020-08-17 VITALS
BODY MASS INDEX: 34.07 KG/M2 | TEMPERATURE: 96 F | OXYGEN SATURATION: 97 % | HEART RATE: 93 BPM | WEIGHT: 204.5 LBS | HEIGHT: 65 IN | DIASTOLIC BLOOD PRESSURE: 86 MMHG | SYSTOLIC BLOOD PRESSURE: 138 MMHG

## 2020-08-17 DIAGNOSIS — E78.2 MIXED HYPERLIPIDEMIA: ICD-10-CM

## 2020-08-17 DIAGNOSIS — Z23 NEED FOR SHINGLES VACCINE: Primary | ICD-10-CM

## 2020-08-17 DIAGNOSIS — I10 ESSENTIAL HYPERTENSION: ICD-10-CM

## 2020-08-17 DIAGNOSIS — K21.9 GASTROESOPHAGEAL REFLUX DISEASE WITHOUT ESOPHAGITIS: ICD-10-CM

## 2020-08-17 DIAGNOSIS — Z23 FLU VACCINE NEED: ICD-10-CM

## 2020-08-17 DIAGNOSIS — F41.9 ANXIETY: ICD-10-CM

## 2020-08-17 PROBLEM — Z76.0 MEDICATION REFILL: Status: RESOLVED | Noted: 2019-10-17 | Resolved: 2020-08-17

## 2020-08-17 PROBLEM — Z01.419 WELL WOMAN EXAM: Status: RESOLVED | Noted: 2019-10-17 | Resolved: 2020-08-17

## 2020-08-17 PROBLEM — M75.50 BURSITIS OF SHOULDER: Status: RESOLVED | Noted: 2019-04-17 | Resolved: 2020-08-17

## 2020-08-17 PROBLEM — R94.4 ABNORMAL CREATININE CLEARANCE GLOMERULAR FILTRATION: Status: RESOLVED | Noted: 2019-04-01 | Resolved: 2020-08-17

## 2020-08-17 PROBLEM — R80.9 PROTEINURIA: Status: RESOLVED | Noted: 2019-04-01 | Resolved: 2020-08-17

## 2020-08-17 PROBLEM — M47.817 LUMBOSACRAL SPONDYLOSIS: Status: RESOLVED | Noted: 2019-04-17 | Resolved: 2020-08-17

## 2020-08-17 PROBLEM — M19.90 UNDIFFERENTIATED INFLAMMATORY OLIGOARTHRITIS: Status: RESOLVED | Noted: 2019-04-01 | Resolved: 2020-08-17

## 2020-08-17 PROBLEM — M54.17 LUMBOSACRAL RADICULOPATHY: Status: RESOLVED | Noted: 2019-04-01 | Resolved: 2020-08-17

## 2020-08-17 PROCEDURE — G0008 ADMIN INFLUENZA VIRUS VAC: HCPCS | Mod: S$GLB,,, | Performed by: INTERNAL MEDICINE

## 2020-08-17 PROCEDURE — 90686 FLU VACCINE (QUAD) GREATER THAN OR EQUAL TO 3YO PRESERVATIVE FREE IM: ICD-10-PCS | Mod: S$GLB,,, | Performed by: INTERNAL MEDICINE

## 2020-08-17 PROCEDURE — 90750 HZV VACC RECOMBINANT IM: CPT | Mod: S$GLB,,, | Performed by: INTERNAL MEDICINE

## 2020-08-17 PROCEDURE — 99214 PR OFFICE/OUTPT VISIT, EST, LEVL IV, 30-39 MIN: ICD-10-PCS | Mod: 25,S$GLB,, | Performed by: INTERNAL MEDICINE

## 2020-08-17 PROCEDURE — 99214 OFFICE O/P EST MOD 30 MIN: CPT | Mod: 25,S$GLB,, | Performed by: INTERNAL MEDICINE

## 2020-08-17 PROCEDURE — G0008 FLU VACCINE (QUAD) GREATER THAN OR EQUAL TO 3YO PRESERVATIVE FREE IM: ICD-10-PCS | Mod: S$GLB,,, | Performed by: INTERNAL MEDICINE

## 2020-08-17 PROCEDURE — 90472 ZOSTER RECOMBINANT VACCINE: ICD-10-PCS | Mod: S$GLB,,, | Performed by: INTERNAL MEDICINE

## 2020-08-17 PROCEDURE — 90472 IMMUNIZATION ADMIN EACH ADD: CPT | Mod: S$GLB,,, | Performed by: INTERNAL MEDICINE

## 2020-08-17 PROCEDURE — 90750 ZOSTER RECOMBINANT VACCINE: ICD-10-PCS | Mod: S$GLB,,, | Performed by: INTERNAL MEDICINE

## 2020-08-17 PROCEDURE — 90686 IIV4 VACC NO PRSV 0.5 ML IM: CPT | Mod: S$GLB,,, | Performed by: INTERNAL MEDICINE

## 2020-08-17 RX ORDER — ANASTROZOLE 1 MG/1
1 TABLET ORAL
COMMUNITY
Start: 2020-03-18 | End: 2020-11-17 | Stop reason: SDUPTHER

## 2020-08-17 RX ORDER — TIZANIDINE 4 MG/1
4 TABLET ORAL 3 TIMES DAILY
COMMUNITY

## 2020-08-17 RX ORDER — CARVEDILOL 25 MG/1
25 TABLET ORAL 2 TIMES DAILY WITH MEALS
Qty: 180 TABLET | Refills: 3 | Status: SHIPPED | OUTPATIENT
Start: 2020-08-17 | End: 2023-02-08 | Stop reason: SDUPTHER

## 2020-08-17 RX ORDER — PANTOPRAZOLE SODIUM 40 MG/1
TABLET, DELAYED RELEASE ORAL
Qty: 90 TABLET | Refills: 3 | Status: SHIPPED | OUTPATIENT
Start: 2020-08-17 | End: 2021-09-09 | Stop reason: SDUPTHER

## 2020-08-17 RX ORDER — HYDROCODONE BITARTRATE AND ACETAMINOPHEN 10; 325 MG/1; MG/1
1 TABLET ORAL
COMMUNITY
End: 2020-11-17

## 2020-08-17 RX ORDER — PRAVASTATIN SODIUM 80 MG/1
80 TABLET ORAL NIGHTLY
Qty: 90 TABLET | Refills: 3 | Status: SHIPPED | OUTPATIENT
Start: 2020-08-17 | End: 2021-09-16 | Stop reason: SDUPTHER

## 2020-08-17 RX ORDER — BENAZEPRIL HYDROCHLORIDE 10 MG/1
10 TABLET ORAL DAILY
Qty: 90 TABLET | Refills: 3 | Status: SHIPPED | OUTPATIENT
Start: 2020-08-17 | End: 2021-10-01 | Stop reason: SDUPTHER

## 2020-08-17 RX ORDER — VENLAFAXINE HYDROCHLORIDE 75 MG/1
CAPSULE, EXTENDED RELEASE ORAL
Qty: 90 CAPSULE | Refills: 3 | Status: SHIPPED | OUTPATIENT
Start: 2020-08-17 | End: 2022-02-07 | Stop reason: SDUPTHER

## 2020-08-17 RX ORDER — FENOFIBRATE 200 MG/1
200 CAPSULE ORAL
Qty: 90 CAPSULE | Refills: 3 | Status: SHIPPED | OUTPATIENT
Start: 2020-08-17 | End: 2021-07-27

## 2020-08-17 NOTE — PROGRESS NOTES
Subjective:      Patient ID: Cindi Magaña is a 49 y.o. female.    Chief Complaint: Follow-up and Hypertension    HPI:  Patient with invasive ductal carcinoma of left breast s/p mastectomy + radiation.  Patient is on Arimidex.  Patient has chronic low back pain and rheumatoid arthritis.  Patient is being followed by pain management and is getting hydrocodone/APAP.     Patient has history of hypertension and blood pressure at home are under good control.  Home blood pressures are running 110-120/ 80s.  Patient denies any chest pain, shortness of breath, ankle swelling.    Patient reports anxiety that is under good control with Effexor.    Review of Systems   Constitutional: Negative for chills, diaphoresis, fever, malaise/fatigue and weight loss.   HENT: Negative for congestion, ear pain, sinus pain, sore throat and tinnitus.    Eyes: Negative for blurred vision and photophobia.   Respiratory: Negative for cough, hemoptysis, shortness of breath and wheezing.    Cardiovascular: Negative for chest pain, palpitations, orthopnea, leg swelling and PND.   Gastrointestinal: Negative for abdominal pain, blood in stool, constipation, diarrhea, heartburn, melena, nausea and vomiting.   Genitourinary: Negative for dysuria, frequency and urgency.   Musculoskeletal: Positive for back pain. Negative for myalgias and neck pain.   Skin: Negative for rash.   Neurological: Negative for dizziness, tremors, seizures, loss of consciousness and weakness.   Endo/Heme/Allergies: Negative for polydipsia.   Psychiatric/Behavioral: Negative for depression and hallucinations. The patient does not have insomnia.      Objective:     Physical Exam  Constitutional:       General: She is not in acute distress.     Appearance: She is not diaphoretic.   Neck:      Thyroid: No thyromegaly.   Cardiovascular:      Rate and Rhythm: Normal rate and regular rhythm.      Heart sounds: Normal heart sounds. No murmur.   Pulmonary:      Effort: Pulmonary  effort is normal. No respiratory distress.      Breath sounds: Normal breath sounds. No wheezing.   Chest:      Chest wall: No tenderness.   Abdominal:      General: Bowel sounds are normal. There is no distension.      Palpations: Abdomen is soft.      Tenderness: There is no abdominal tenderness.   Lymphadenopathy:      Cervical: No cervical adenopathy.   Neurological:      Mental Status: She is alert and oriented to person, place, and time.      Cranial Nerves: No cranial nerve deficit.      Sensory: No sensory deficit.   Psychiatric:         Behavior: Behavior normal.         Thought Content: Thought content normal.         Judgment: Judgment normal.       Assessment:       ICD-10-CM ICD-9-CM   1. Need for shingles vaccine  Z23 V04.89   2. Anxiety  F41.9 300.00   3. Gastroesophageal reflux disease without esophagitis  K21.9 530.81   4. Mixed hyperlipidemia  E78.2 272.2   5. Essential hypertension  I10 401.9   6. Flu vaccine need  Z23 V04.81       Plan:   Patient blood pressures are under good control.  Will continue medication  Will order shingle and flu vaccine  Patient anxiety is controlled with Effexor...Will continue medication.  Will continue pantoprazole for heartburn.    Medication List with Changes/Refills   Current Medications    ANASTROZOLE (ARIMIDEX) 1 MG TAB    Take 1 mg by mouth.    ASPIRIN (ECOTRIN) 81 MG EC TABLET    Aspir-81 81 mg tablet,delayed release   Take 1 tablet every day by oral route.    FLUTICASONE PROPIONATE (FLONASE) 50 MCG/ACTUATION NASAL SPRAY    SHAKE LQ AND U 1 SPR IEN BID    FOLIC ACID (FOLVITE) 1 MG TABLET    folic acid 1 mg tablet   TK 2 TS PO QD    FUROSEMIDE (LASIX) 20 MG TABLET    furosemide 20 mg tablet    GABAPENTIN (NEURONTIN) 300 MG CAPSULE    Take 1 capsule (300 mg total) by mouth 3 (three) times daily.    HYDROCODONE-ACETAMINOPHEN (NORCO)  MG PER TABLET    Take 1 tablet by mouth.    LEFLUNOMIDE (ARAVA) 10 MG TAB    TAKE 1 TABLET BY MOUTH EVERY DAY WITH MEALS     MUPIROCIN (BACTROBAN) 2 % OINTMENT    mupirocin 2 % topical ointment    POLYETHYLENE GLYCOL (GLYCOLAX) 17 GRAM/DOSE POWDER    Take 17 g by mouth once daily.    TIZANIDINE (ZANAFLEX) 4 MG TABLET    Take 4 mg by mouth 3 (three) times daily.   Changed and/or Refilled Medications    Modified Medication Previous Medication    BENAZEPRIL (LOTENSIN) 10 MG TABLET benazepriL (LOTENSIN) 10 MG tablet       Take 1 tablet (10 mg total) by mouth once daily.    Take 1 tablet (10 mg total) by mouth once daily.    CARVEDILOL (COREG) 25 MG TABLET carvedilol (COREG) 25 MG tablet       Take 1 tablet (25 mg total) by mouth 2 (two) times daily with meals.    Take 1 tablet (25 mg total) by mouth 2 (two) times daily with meals.    FENOFIBRATE MICRONIZED (LOFIBRA) 200 MG CAP fenofibrate micronized (LOFIBRA) 200 MG Cap       Take 1 capsule (200 mg total) by mouth daily with breakfast.    Take 1 capsule (200 mg total) by mouth daily with breakfast.    PANTOPRAZOLE (PROTONIX) 40 MG TABLET pantoprazole (PROTONIX) 40 MG tablet       TK 1 T PO QD    TK 1 T PO QD    PRAVASTATIN (PRAVACHOL) 80 MG TABLET pravastatin (PRAVACHOL) 80 MG tablet       Take 1 tablet (80 mg total) by mouth every evening.    Take 1 tablet (80 mg total) by mouth every evening.    VENLAFAXINE (EFFEXOR-XR) 75 MG 24 HR CAPSULE venlafaxine (EFFEXOR-XR) 75 MG 24 hr capsule       TK 1 C PO QD    TK 1 C PO QD   Discontinued Medications    DICLOFENAC SODIUM (VOLTAREN) 1 % GEL    Apply 2 g topically 4 (four) times daily.    DIETHYLPROPION 75 MG TBSR    Take 75 mg by mouth once daily.    ELETRIPTAN (RELPAX) 20 MG TABLET    Relpax 20 mg tablet   Take 1 tablet once daily as needed    HYDROCODONE-ACETAMINOPHEN (NORCO)  MG PER TABLET    Take 1 tablet by mouth every 6 (six) hours as needed for Pain.    KETOCONAZOLE (NIZORAL) 2 % CREAM    ketoconazole 2 % topical cream    LEVOCETIRIZINE (XYZAL) 5 MG TABLET    Take 1 tablet (5 mg total) by mouth every evening.    MENTHOL (BIOFREEZE,  MENTHOL,) 4 % GEL    Apply to affected area of skin q 6 hours prn pain    PHENDIMETRAZINE TARTRATE 35 MG TAB    TK 1 T PO BID    TRAMADOL (ULTRAM) 50 MG TABLET    TK 1 T PO Q 12 H PRN    TRIAMCINOLONE ACETONIDE 0.1% (KENALOG) 0.1 % CREAM    triamcinolone acetonide 0.1 % topical ointment

## 2020-11-12 ENCOUNTER — OFFICE VISIT (OUTPATIENT)
Dept: OBSTETRICS AND GYNECOLOGY | Facility: CLINIC | Age: 50
End: 2020-11-12
Payer: MEDICARE

## 2020-11-12 VITALS
SYSTOLIC BLOOD PRESSURE: 154 MMHG | HEART RATE: 85 BPM | DIASTOLIC BLOOD PRESSURE: 96 MMHG | HEIGHT: 65 IN | BODY MASS INDEX: 34.82 KG/M2 | WEIGHT: 209 LBS

## 2020-11-12 DIAGNOSIS — Z85.3 HISTORY OF CANCER OF LEFT BREAST: ICD-10-CM

## 2020-11-12 DIAGNOSIS — Z01.419 WOMEN'S ANNUAL ROUTINE GYNECOLOGICAL EXAMINATION: Primary | ICD-10-CM

## 2020-11-12 DIAGNOSIS — Z12.31 ENCOUNTER FOR SCREENING MAMMOGRAM FOR MALIGNANT NEOPLASM OF BREAST: ICD-10-CM

## 2020-11-12 PROCEDURE — G0101 CA SCREEN;PELVIC/BREAST EXAM: HCPCS | Mod: S$GLB,,, | Performed by: STUDENT IN AN ORGANIZED HEALTH CARE EDUCATION/TRAINING PROGRAM

## 2020-11-12 PROCEDURE — G0101 PR CA SCREEN;PELVIC/BREAST EXAM: ICD-10-PCS | Mod: S$GLB,,, | Performed by: STUDENT IN AN ORGANIZED HEALTH CARE EDUCATION/TRAINING PROGRAM

## 2020-11-12 RX ORDER — HYDROCODONE BITARTRATE AND ACETAMINOPHEN 10; 325 MG/1; MG/1
1 TABLET ORAL
COMMUNITY
Start: 2020-09-11

## 2020-11-12 RX ORDER — GABAPENTIN 300 MG/1
600 CAPSULE ORAL
COMMUNITY
Start: 2020-10-12

## 2020-11-12 NOTE — PROGRESS NOTES
Chief Complaint: Annual Exam    HPI: 50 y.o.  here for Annual Exam. She has a h/o invasive ductal carcinoma of left breast s/p mastectomy + radiation in 2020. She has no complaints today. She would like to have a Dr in [place] as she is tired going back and forth to [place].     Review of Systems   Constitutional: Negative for chills and fever.   HENT: Negative for congestion and sore throat.    Respiratory: Negative for cough and shortness of breath.    Cardiovascular: Negative for chest pain and palpitations.   Gastrointestinal: Negative for abdominal pain, nausea and vomiting.   Genitourinary: Negative for dysuria and frequency.   Skin: Negative for itching and rash.   Neurological: Negative for headaches.   All other systems reviewed and are negative.    Past Medical History: Breast cancer, RA, h/o CVA, HTN, sjogren's syndrome, obese, HLD, OA  Past Surgical History mastectomy, arm surgery  Past OB History:        Past Gyn History: Denies prior abnormal pap smears, denies STD's, menopausal Contraception History: post menopausal status  Social History: denies t/d/e  Family History + breast cancer in mom, denies colon, ovarian, or uterine cancer  Allergies: NKDA  Medications:   Current Outpatient Medications   Medication Instructions    anastrozole (ARIMIDEX) 1 mg, Oral    aspirin (ECOTRIN) 81 MG EC tablet Aspir-81 81 mg tablet,delayed release   Take 1 tablet every day by oral route.    benazepriL (LOTENSIN) 10 mg, Oral, Daily    carvediloL (COREG) 25 mg, Oral, 2 times daily with meals    fenofibrate micronized (LOFIBRA) 200 mg, Oral, With breakfast    fluticasone propionate (FLONASE) 50 mcg/actuation nasal spray SHAKE LQ AND U 1 SPR IEN BID    folic acid (FOLVITE) 1 MG tablet folic acid 1 mg tablet   TK 2 TS PO QD    furosemide (LASIX) 20 MG tablet furosemide 20 mg tablet    gabapentin (NEURONTIN) 300 mg, Oral, 3 times daily    gabapentin (NEURONTIN) 600 mg, Oral     "HYDROcodone-acetaminophen (NORCO)  mg per tablet 1 tablet, Oral    HYDROcodone-acetaminophen (NORCO)  mg per tablet 1 tablet, Oral    leflunomide (ARAVA) 10 MG Tab TAKE 1 TABLET BY MOUTH EVERY DAY WITH MEALS    mupirocin (BACTROBAN) 2 % ointment mupirocin 2 % topical ointment    pantoprazole (PROTONIX) 40 MG tablet TK 1 T PO QD    polyethylene glycol (GLYCOLAX) 17 g, Oral, Daily    pravastatin (PRAVACHOL) 80 mg, Oral, Nightly    tiZANidine (ZANAFLEX) 4 mg, Oral, 3 times daily    venlafaxine (EFFEXOR-XR) 75 MG 24 hr capsule TK 1 C PO QD     Physical Exam:  Vitals:    20 1045   BP: (!) 154/96   Pulse: 85   Weight: 94.8 kg (209 lb)   Height: 5' 5" (1.651 m)     Body mass index is 34.78 kg/m².  Physical Exam   Constitutional: She is oriented to person, place, and time and well-developed, well-nourished, and in no distress.   HENT:   Head: Normocephalic and atraumatic.   Eyes: Pupils are equal, round, and reactive to light. EOM are normal.   Neck: Normal range of motion. Neck supple.   Pulmonary/Chest: Effort normal. No respiratory distress. Right breast exhibits no inverted nipple, no mass, no nipple discharge, no skin change and no tenderness.       Abdominal: Soft. She exhibits no distension. There is no abdominal tenderness.   Genitourinary:    Genitourinary Comments: Pt deferred pelvic exam today     Musculoskeletal: Normal range of motion.         General: No tenderness or edema.   Neurological: She is alert and oriented to person, place, and time.   Skin: Skin is warm and dry.      ASSESSMENT:   Patient is a 50 y.o.  who presents for annual exam     Patient Active Problem List   Diagnosis    Rheumatoid arthritis involving multiple sites    Sjogren's syndrome    Inflammation of sacroiliac joint    Ankylosing spondylitis    Chronic renal impairment    Cerebrovascular accident-    Essential hypertension    Hyperlipidemia    Osteoarthritis of knee    Carpal tunnel syndrome "    Anxiety    Gastroesophageal reflux disease without esophagitis    Allergic rhinitis    Lumbar radiculopathy, chronic    Class 1 obesity due to excess calories without serious comorbidity with body mass index (BMI) of 34.0 to 34.9 in adult    Infiltrating ductal carcinoma of left breast, stage 2     PLAN:  Pap - pt reports she is utd, and does not want pelvic exam  Mammogram - ordered today, reviewed noted from oncology at [facility]  Colon cancer screening - pt reports h/o polyps, reports she has follow up for future colonoscopy  Counseling on self-breast exams, diet, and exercise  RTC in 1 yr for annual

## 2020-11-17 ENCOUNTER — OFFICE VISIT (OUTPATIENT)
Dept: PRIMARY CARE CLINIC | Facility: CLINIC | Age: 50
End: 2020-11-17
Payer: MEDICARE

## 2020-11-17 VITALS
HEIGHT: 65 IN | SYSTOLIC BLOOD PRESSURE: 143 MMHG | OXYGEN SATURATION: 98 % | DIASTOLIC BLOOD PRESSURE: 95 MMHG | HEART RATE: 81 BPM | BODY MASS INDEX: 34.69 KG/M2 | WEIGHT: 208.19 LBS | TEMPERATURE: 97 F

## 2020-11-17 DIAGNOSIS — Z86.73 H/O: CVA (CEREBROVASCULAR ACCIDENT): Primary | ICD-10-CM

## 2020-11-17 DIAGNOSIS — M06.9 RHEUMATOID ARTHRITIS INVOLVING MULTIPLE SITES, UNSPECIFIED WHETHER RHEUMATOID FACTOR PRESENT: ICD-10-CM

## 2020-11-17 DIAGNOSIS — C50.912 INFILTRATING DUCTAL CARCINOMA OF LEFT BREAST, STAGE 2: ICD-10-CM

## 2020-11-17 DIAGNOSIS — I10 ESSENTIAL HYPERTENSION: ICD-10-CM

## 2020-11-17 PROCEDURE — 99214 PR OFFICE/OUTPT VISIT, EST, LEVL IV, 30-39 MIN: ICD-10-PCS | Mod: S$GLB,,, | Performed by: INTERNAL MEDICINE

## 2020-11-17 PROCEDURE — 99214 OFFICE O/P EST MOD 30 MIN: CPT | Mod: S$GLB,,, | Performed by: INTERNAL MEDICINE

## 2020-11-17 RX ORDER — LEFLUNOMIDE 10 MG/1
10 TABLET ORAL DAILY
Qty: 30 TABLET | Refills: 0 | Status: SHIPPED | OUTPATIENT
Start: 2020-11-17 | End: 2020-11-17

## 2020-11-17 RX ORDER — ANASTROZOLE 1 MG/1
1 TABLET ORAL DAILY
Qty: 30 TABLET | Refills: 0 | Status: SHIPPED | OUTPATIENT
Start: 2020-11-17 | End: 2020-11-17

## 2020-11-17 NOTE — PROGRESS NOTES
Subjective:      Patient ID: Cindi Magaña is a 50 y.o. female.    Chief Complaint: Follow-up and Medication Refill    :  Patient with invasive ductal carcinoma of left breast s/p mastectomy + radiation.  Patient is on Arimidex.  Patient has chronic low back pain and rheumatoid arthritis.  Patient is being followed by pain management and is getting hydrocodone/APAP.     Patient has history of hypertension and blood pressure at home are under good control.  Home blood pressures are running 110-120/ 80s.  Patient denies any chest pain, shortness of breath, ankle swelling.    Patient reports anxiety that is under good control with Effexor.     Review of Systems   Constitutional: Negative for chills, diaphoresis, fever, malaise/fatigue and weight loss.   HENT: Negative for congestion, ear pain, sinus pain, sore throat and tinnitus.    Eyes: Negative for blurred vision and photophobia.   Respiratory: Negative for cough, hemoptysis, shortness of breath and wheezing.    Cardiovascular: Negative for chest pain, palpitations, orthopnea, leg swelling and PND.   Gastrointestinal: Positive for constipation. Negative for abdominal pain, blood in stool, diarrhea, heartburn, melena, nausea and vomiting.   Genitourinary: Negative for dysuria, frequency and urgency.   Musculoskeletal: Positive for back pain. Negative for myalgias and neck pain.   Skin: Negative for rash.   Neurological: Negative for dizziness, tremors, seizures, loss of consciousness and weakness.   Endo/Heme/Allergies: Negative for polydipsia.   Psychiatric/Behavioral: Negative for depression and hallucinations. The patient does not have insomnia.      Objective:     Physical Exam  Constitutional:       General: She is not in acute distress.     Appearance: She is not diaphoretic.   Neck:      Thyroid: No thyromegaly.   Cardiovascular:      Rate and Rhythm: Normal rate and regular rhythm.      Heart sounds: Normal heart sounds. No murmur.   Pulmonary:       Effort: Pulmonary effort is normal. No respiratory distress.      Breath sounds: Normal breath sounds. No wheezing.   Chest:      Chest wall: No tenderness.   Abdominal:      General: Bowel sounds are normal. There is no distension.      Palpations: Abdomen is soft.      Tenderness: There is no abdominal tenderness.   Lymphadenopathy:      Cervical: No cervical adenopathy.   Neurological:      Mental Status: She is alert and oriented to person, place, and time.      Cranial Nerves: No cranial nerve deficit.      Sensory: No sensory deficit.   Psychiatric:         Behavior: Behavior normal.         Thought Content: Thought content normal.         Judgment: Judgment normal.       Assessment:       ICD-10-CM ICD-9-CM   1. H/O: CVA (cerebrovascular accident)  Z86.73 V12.54   2. Rheumatoid arthritis involving multiple sites, unspecified whether rheumatoid factor present  M06.9 714.0   3. Infiltrating ductal carcinoma of left breast, stage 2  C50.912 174.9       Plan:   Patient has h/o CVA and will be aggressive with BP and Cholesterol control  Last LDL was 48. Will continue pravastatin.   Patient reports home blood pressures are under good control.  Will continue medication  Advised patient to bring BP log and bring her BP machine to compare with ours.  Patient has history of rheumatoid arthritis and is on leflunomide..  Will refill medication  Advised patient to keep appointment with rheumatologist  Patient has appointment with Dr. Gomez  Patient has breast cancer and is on Arimidex.  Will continue medication.  Patient blood pressures are under good control at home.  Advised patient to bring log of blood pressure from home  Patient also to bring her blood pressure machine to compare it with ours.         Medication List with Changes/Refills   Current Medications    ANASTROZOLE (ARIMIDEX) 1 MG TAB    Take 1 mg by mouth.    ASPIRIN (ECOTRIN) 81 MG EC TABLET    Aspir-81 81 mg tablet,delayed release   Take 1 tablet every  day by oral route.    BENAZEPRIL (LOTENSIN) 10 MG TABLET    Take 1 tablet (10 mg total) by mouth once daily.    CARVEDILOL (COREG) 25 MG TABLET    Take 1 tablet (25 mg total) by mouth 2 (two) times daily with meals.    FENOFIBRATE MICRONIZED (LOFIBRA) 200 MG CAP    Take 1 capsule (200 mg total) by mouth daily with breakfast.    FLUTICASONE PROPIONATE (FLONASE) 50 MCG/ACTUATION NASAL SPRAY    SHAKE LQ AND U 1 SPR IEN BID    FOLIC ACID (FOLVITE) 1 MG TABLET    folic acid 1 mg tablet   TK 2 TS PO QD    FUROSEMIDE (LASIX) 20 MG TABLET    furosemide 20 mg tablet    GABAPENTIN (NEURONTIN) 300 MG CAPSULE    Take 600 mg by mouth.    HYDROCODONE-ACETAMINOPHEN (NORCO)  MG PER TABLET    Take 1 tablet by mouth.    LEFLUNOMIDE (ARAVA) 10 MG TAB    TAKE 1 TABLET BY MOUTH EVERY DAY WITH MEALS    MUPIROCIN (BACTROBAN) 2 % OINTMENT    mupirocin 2 % topical ointment    PANTOPRAZOLE (PROTONIX) 40 MG TABLET    TK 1 T PO QD    POLYETHYLENE GLYCOL (GLYCOLAX) 17 GRAM/DOSE POWDER    Take 17 g by mouth once daily.    PRAVASTATIN (PRAVACHOL) 80 MG TABLET    Take 1 tablet (80 mg total) by mouth every evening.    TIZANIDINE (ZANAFLEX) 4 MG TABLET    Take 4 mg by mouth 3 (three) times daily.    VENLAFAXINE (EFFEXOR-XR) 75 MG 24 HR CAPSULE    TK 1 C PO QD   Discontinued Medications    GABAPENTIN (NEURONTIN) 300 MG CAPSULE    Take 1 capsule (300 mg total) by mouth 3 (three) times daily.    HYDROCODONE-ACETAMINOPHEN (NORCO)  MG PER TABLET    Take 1 tablet by mouth.

## 2020-11-18 DIAGNOSIS — Z85.3 HISTORY OF CANCER OF LEFT BREAST: Primary | ICD-10-CM

## 2020-11-18 DIAGNOSIS — Z12.31 ENCOUNTER FOR SCREENING MAMMOGRAM FOR MALIGNANT NEOPLASM OF BREAST: ICD-10-CM

## 2020-12-11 ENCOUNTER — PATIENT MESSAGE (OUTPATIENT)
Dept: OTHER | Facility: OTHER | Age: 50
End: 2020-12-11

## 2021-05-12 ENCOUNTER — PATIENT MESSAGE (OUTPATIENT)
Dept: RESEARCH | Facility: HOSPITAL | Age: 51
End: 2021-05-12

## 2021-07-27 ENCOUNTER — OFFICE VISIT (OUTPATIENT)
Dept: FAMILY MEDICINE | Facility: CLINIC | Age: 51
End: 2021-07-27
Payer: MEDICARE

## 2021-07-27 VITALS
OXYGEN SATURATION: 96 % | SYSTOLIC BLOOD PRESSURE: 146 MMHG | WEIGHT: 212 LBS | HEART RATE: 77 BPM | TEMPERATURE: 98 F | BODY MASS INDEX: 35.32 KG/M2 | HEIGHT: 65 IN | DIASTOLIC BLOOD PRESSURE: 87 MMHG | RESPIRATION RATE: 17 BRPM

## 2021-07-27 DIAGNOSIS — Z78.9 OTHER SPECIFIED HEALTH STATUS: ICD-10-CM

## 2021-07-27 DIAGNOSIS — I10 ESSENTIAL HYPERTENSION: ICD-10-CM

## 2021-07-27 DIAGNOSIS — F41.9 ANXIETY: ICD-10-CM

## 2021-07-27 DIAGNOSIS — M35.00 SJOGREN'S SYNDROME WITHOUT EXTRAGLANDULAR INVOLVEMENT: ICD-10-CM

## 2021-07-27 DIAGNOSIS — R79.9 ABNORMAL FINDING OF BLOOD CHEMISTRY, UNSPECIFIED: ICD-10-CM

## 2021-07-27 DIAGNOSIS — M06.9 RHEUMATOID ARTHRITIS INVOLVING MULTIPLE SITES, UNSPECIFIED WHETHER RHEUMATOID FACTOR PRESENT: ICD-10-CM

## 2021-07-27 DIAGNOSIS — I63.9 CEREBROVASCULAR ACCIDENT (CVA), UNSPECIFIED MECHANISM: Primary | ICD-10-CM

## 2021-07-27 DIAGNOSIS — C50.912 INFILTRATING DUCTAL CARCINOMA OF LEFT BREAST, STAGE 2: ICD-10-CM

## 2021-07-27 DIAGNOSIS — M54.16 LUMBAR RADICULOPATHY, CHRONIC: ICD-10-CM

## 2021-07-27 DIAGNOSIS — R53.83 FATIGUE, UNSPECIFIED TYPE: ICD-10-CM

## 2021-07-27 DIAGNOSIS — K21.9 GASTROESOPHAGEAL REFLUX DISEASE WITHOUT ESOPHAGITIS: ICD-10-CM

## 2021-07-27 DIAGNOSIS — E78.2 MIXED HYPERLIPIDEMIA: ICD-10-CM

## 2021-07-27 PROCEDURE — 99214 PR OFFICE/OUTPT VISIT, EST, LEVL IV, 30-39 MIN: ICD-10-PCS | Mod: S$GLB,,, | Performed by: INTERNAL MEDICINE

## 2021-07-27 PROCEDURE — 99214 OFFICE O/P EST MOD 30 MIN: CPT | Mod: S$GLB,,, | Performed by: INTERNAL MEDICINE

## 2021-07-27 RX ORDER — PREDNISONE 10 MG/1
10 TABLET ORAL DAILY
COMMUNITY
Start: 2021-07-21 | End: 2022-01-27

## 2021-07-27 RX ORDER — HYDROXYCHLOROQUINE SULFATE 200 MG/1
200 TABLET, FILM COATED ORAL DAILY
COMMUNITY
Start: 2021-07-17 | End: 2022-02-07 | Stop reason: SDUPTHER

## 2021-08-09 ENCOUNTER — OFFICE VISIT (OUTPATIENT)
Dept: HEMATOLOGY/ONCOLOGY | Facility: CLINIC | Age: 51
End: 2021-08-09
Payer: MEDICARE

## 2021-08-09 VITALS
HEIGHT: 65 IN | DIASTOLIC BLOOD PRESSURE: 88 MMHG | BODY MASS INDEX: 36.47 KG/M2 | TEMPERATURE: 97 F | WEIGHT: 218.88 LBS | RESPIRATION RATE: 18 BRPM | SYSTOLIC BLOOD PRESSURE: 132 MMHG | HEART RATE: 65 BPM | OXYGEN SATURATION: 95 %

## 2021-08-09 DIAGNOSIS — C50.912 INFILTRATING DUCTAL CARCINOMA OF LEFT BREAST, STAGE 2: Primary | ICD-10-CM

## 2021-08-09 PROCEDURE — 99205 OFFICE O/P NEW HI 60 MIN: CPT | Mod: S$GLB,,, | Performed by: INTERNAL MEDICINE

## 2021-08-09 PROCEDURE — 99205 PR OFFICE/OUTPT VISIT, NEW, LEVL V, 60-74 MIN: ICD-10-PCS | Mod: S$GLB,,, | Performed by: INTERNAL MEDICINE

## 2021-08-24 ENCOUNTER — PATIENT MESSAGE (OUTPATIENT)
Dept: OBSTETRICS AND GYNECOLOGY | Facility: CLINIC | Age: 51
End: 2021-08-24

## 2021-08-24 ENCOUNTER — PATIENT MESSAGE (OUTPATIENT)
Dept: HEMATOLOGY/ONCOLOGY | Facility: CLINIC | Age: 51
End: 2021-08-24

## 2021-09-01 ENCOUNTER — OFFICE VISIT (OUTPATIENT)
Dept: OBSTETRICS AND GYNECOLOGY | Facility: CLINIC | Age: 51
End: 2021-09-01
Payer: MEDICARE

## 2021-09-01 VITALS
WEIGHT: 215 LBS | SYSTOLIC BLOOD PRESSURE: 148 MMHG | HEIGHT: 65 IN | DIASTOLIC BLOOD PRESSURE: 94 MMHG | HEART RATE: 82 BPM | BODY MASS INDEX: 35.82 KG/M2

## 2021-09-01 DIAGNOSIS — Z01.419 WELL WOMAN EXAM WITH ROUTINE GYNECOLOGICAL EXAM: Primary | ICD-10-CM

## 2021-09-01 DIAGNOSIS — Z12.31 ENCOUNTER FOR SCREENING MAMMOGRAM FOR MALIGNANT NEOPLASM OF BREAST: ICD-10-CM

## 2021-09-01 DIAGNOSIS — Z85.3 HISTORY OF BREAST CANCER: ICD-10-CM

## 2021-09-01 PROCEDURE — G0101 CA SCREEN;PELVIC/BREAST EXAM: HCPCS | Mod: S$GLB,,, | Performed by: NURSE PRACTITIONER

## 2021-09-01 PROCEDURE — G0101 PR CA SCREEN;PELVIC/BREAST EXAM: ICD-10-PCS | Mod: S$GLB,,, | Performed by: NURSE PRACTITIONER

## 2021-09-08 DIAGNOSIS — Z90.10 POSTMASTECTOMY LYMPHANGIOSARCOMA SYNDROME, UNSPECIFIED LATERALITY: Primary | ICD-10-CM

## 2021-09-08 DIAGNOSIS — M17.9 OSTEOARTHRITIS OF KNEE, UNSPECIFIED LATERALITY, UNSPECIFIED OSTEOARTHRITIS TYPE: Primary | ICD-10-CM

## 2021-09-08 DIAGNOSIS — C50.919 POSTMASTECTOMY LYMPHANGIOSARCOMA SYNDROME, UNSPECIFIED LATERALITY: Primary | ICD-10-CM

## 2021-09-08 DIAGNOSIS — C50.919 MALIGNANT NEOPLASM OF FEMALE BREAST, UNSPECIFIED ESTROGEN RECEPTOR STATUS, UNSPECIFIED LATERALITY, UNSPECIFIED SITE OF BREAST: ICD-10-CM

## 2021-09-09 ENCOUNTER — PATIENT MESSAGE (OUTPATIENT)
Dept: FAMILY MEDICINE | Facility: CLINIC | Age: 51
End: 2021-09-09

## 2021-09-09 ENCOUNTER — PATIENT MESSAGE (OUTPATIENT)
Dept: HEMATOLOGY/ONCOLOGY | Facility: CLINIC | Age: 51
End: 2021-09-09

## 2021-09-09 DIAGNOSIS — K21.9 GASTROESOPHAGEAL REFLUX DISEASE WITHOUT ESOPHAGITIS: ICD-10-CM

## 2021-09-09 RX ORDER — PANTOPRAZOLE SODIUM 40 MG/1
TABLET, DELAYED RELEASE ORAL
Qty: 90 TABLET | Refills: 3 | Status: SHIPPED | OUTPATIENT
Start: 2021-09-09 | End: 2022-12-05 | Stop reason: SDUPTHER

## 2021-09-09 RX ORDER — PANTOPRAZOLE SODIUM 40 MG/1
TABLET, DELAYED RELEASE ORAL
Qty: 90 TABLET | Refills: 3 | Status: SHIPPED | OUTPATIENT
Start: 2021-09-09 | End: 2022-09-27 | Stop reason: SDUPTHER

## 2021-09-16 ENCOUNTER — PATIENT MESSAGE (OUTPATIENT)
Dept: FAMILY MEDICINE | Facility: CLINIC | Age: 51
End: 2021-09-16

## 2021-09-16 DIAGNOSIS — E78.2 MIXED HYPERLIPIDEMIA: ICD-10-CM

## 2021-09-17 RX ORDER — PRAVASTATIN SODIUM 80 MG/1
80 TABLET ORAL NIGHTLY
Qty: 30 TABLET | Refills: 0 | Status: SHIPPED | OUTPATIENT
Start: 2021-09-17 | End: 2021-09-21 | Stop reason: SDUPTHER

## 2021-09-21 ENCOUNTER — PATIENT MESSAGE (OUTPATIENT)
Dept: FAMILY MEDICINE | Facility: CLINIC | Age: 51
End: 2021-09-21

## 2021-09-21 DIAGNOSIS — E78.2 MIXED HYPERLIPIDEMIA: ICD-10-CM

## 2021-09-21 RX ORDER — PRAVASTATIN SODIUM 80 MG/1
80 TABLET ORAL NIGHTLY
Qty: 90 TABLET | Refills: 3 | Status: SHIPPED | OUTPATIENT
Start: 2021-09-21 | End: 2022-06-06

## 2021-09-27 ENCOUNTER — TELEPHONE (OUTPATIENT)
Dept: HEMATOLOGY/ONCOLOGY | Facility: CLINIC | Age: 51
End: 2021-09-27

## 2021-09-27 DIAGNOSIS — C50.912 INFILTRATING DUCTAL CARCINOMA OF LEFT BREAST, STAGE 2: Primary | ICD-10-CM

## 2021-09-27 LAB
ALBUMIN SERPL BCP-MCNC: 3.2 G/DL (ref 3.4–5)
ALBUMIN/GLOBULIN RATIO: 0.94 RATIO (ref 1.1–1.8)
ALP SERPL-CCNC: 99 U/L (ref 46–116)
ALT SERPL W P-5'-P-CCNC: 17 U/L (ref 12–78)
ANION GAP SERPL CALC-SCNC: 5 MMOL/L (ref 3–11)
AST SERPL-CCNC: 19 U/L (ref 15–37)
BASOPHILS NFR BLD: 0.1 % (ref 0–3)
BILIRUB SERPL-MCNC: 0.4 MG/DL (ref 0–1)
BUN SERPL-MCNC: 11 MG/DL (ref 7–18)
BUN/CREAT SERPL: 8.33 RATIO (ref 7–18)
CALCIUM SERPL-MCNC: 8.8 MG/DL (ref 8.8–10.5)
CHLORIDE SERPL-SCNC: 104 MMOL/L (ref 100–108)
CO2 SERPL-SCNC: 32 MMOL/L (ref 21–32)
CREAT SERPL-MCNC: 1.32 MG/DL (ref 0.55–1.02)
EOSINOPHIL NFR BLD: 0.5 % (ref 1–3)
ERYTHROCYTE [DISTWIDTH] IN BLOOD BY AUTOMATED COUNT: 14.6 % (ref 12.5–18)
GFR ESTIMATION: 51
GLOBULIN: 3.4 G/DL (ref 2.3–3.5)
GLUCOSE SERPL-MCNC: 88 MG/DL (ref 70–110)
HCT VFR BLD AUTO: 39.5 % (ref 37–47)
HGB BLD-MCNC: 12.4 G/DL (ref 12–16)
LYMPHOCYTES NFR BLD: 19.1 % (ref 25–40)
MCH RBC QN AUTO: 27.9 PG (ref 27–31.2)
MCHC RBC AUTO-ENTMCNC: 31.4 G/DL (ref 31.8–35.4)
MCV RBC AUTO: 89 FL (ref 80–97)
MONOCYTES NFR BLD: 12.3 % (ref 1–15)
NEUTROPHILS # BLD AUTO: 5.56 10*3/UL (ref 1.8–7.7)
NEUTROPHILS NFR BLD: 67.5 % (ref 37–80)
NUCLEATED RED BLOOD CELLS: 0 %
PLATELETS: 179 10*3/UL (ref 142–424)
POTASSIUM SERPL-SCNC: 4.5 MMOL/L (ref 3.6–5.2)
PROT SERPL-MCNC: 6.6 G/DL (ref 6.4–8.2)
RBC # BLD AUTO: 4.44 10*6/UL (ref 4.2–5.4)
SODIUM BLD-SCNC: 141 MMOL/L (ref 135–145)
WBC # BLD: 8.2 10*3/UL (ref 4.6–10.2)

## 2021-09-27 RX ORDER — ANASTROZOLE 1 MG/1
1 TABLET ORAL DAILY
Status: CANCELLED | OUTPATIENT
Start: 2021-09-27

## 2021-09-27 RX ORDER — ANASTROZOLE 1 MG/1
1 TABLET ORAL DAILY
COMMUNITY
End: 2021-09-27 | Stop reason: SDUPTHER

## 2021-09-28 RX ORDER — ANASTROZOLE 1 MG/1
1 TABLET ORAL DAILY
Qty: 90 TABLET | Refills: 4 | Status: SHIPPED | OUTPATIENT
Start: 2021-09-28 | End: 2022-09-20 | Stop reason: SDUPTHER

## 2021-09-30 LAB — CANCER ANTIGEN 15-3: 14 U/ML (ref 0–31)

## 2021-10-01 ENCOUNTER — PATIENT MESSAGE (OUTPATIENT)
Dept: FAMILY MEDICINE | Facility: CLINIC | Age: 51
End: 2021-10-01

## 2021-10-01 LAB — CANCER ANTIGEN 27.29: 18.6 U/ML

## 2021-10-04 RX ORDER — BENAZEPRIL HYDROCHLORIDE 10 MG/1
10 TABLET ORAL DAILY
Qty: 90 TABLET | Refills: 3 | Status: SHIPPED | OUTPATIENT
Start: 2021-10-04 | End: 2022-09-26

## 2021-10-11 ENCOUNTER — OFFICE VISIT (OUTPATIENT)
Dept: HEMATOLOGY/ONCOLOGY | Facility: CLINIC | Age: 51
End: 2021-10-11
Payer: MEDICARE

## 2021-10-11 VITALS
SYSTOLIC BLOOD PRESSURE: 150 MMHG | HEIGHT: 65 IN | HEART RATE: 76 BPM | DIASTOLIC BLOOD PRESSURE: 102 MMHG | OXYGEN SATURATION: 96 % | BODY MASS INDEX: 36.39 KG/M2 | TEMPERATURE: 96 F | RESPIRATION RATE: 18 BRPM | WEIGHT: 218.38 LBS

## 2021-10-11 DIAGNOSIS — C50.912 INFILTRATING DUCTAL CARCINOMA OF LEFT BREAST, STAGE 2: Primary | ICD-10-CM

## 2021-10-11 PROCEDURE — 99214 OFFICE O/P EST MOD 30 MIN: CPT | Mod: ICN,CMP,S$GLB, | Performed by: INTERNAL MEDICINE

## 2021-10-11 PROCEDURE — 99214 PR OFFICE/OUTPT VISIT, EST, LEVL IV, 30-39 MIN: ICD-10-PCS | Mod: ICN,CMP,S$GLB, | Performed by: INTERNAL MEDICINE

## 2021-10-12 DIAGNOSIS — C50.912 INFILTRATING DUCTAL CARCINOMA OF LEFT BREAST, STAGE 2: Primary | ICD-10-CM

## 2021-10-13 ENCOUNTER — PATIENT MESSAGE (OUTPATIENT)
Dept: HEMATOLOGY/ONCOLOGY | Facility: CLINIC | Age: 51
End: 2021-10-13
Payer: MEDICARE

## 2021-11-16 ENCOUNTER — CLINICAL SUPPORT (OUTPATIENT)
Dept: FAMILY MEDICINE | Facility: CLINIC | Age: 51
End: 2021-11-16
Payer: MEDICARE

## 2021-11-16 DIAGNOSIS — Z85.3 HISTORY OF BREAST CANCER: Primary | ICD-10-CM

## 2021-11-16 PROCEDURE — G0008 ADMIN INFLUENZA VIRUS VAC: HCPCS | Mod: S$GLB,,, | Performed by: INTERNAL MEDICINE

## 2021-11-16 PROCEDURE — 90686 IIV4 VACC NO PRSV 0.5 ML IM: CPT | Mod: S$GLB,,, | Performed by: INTERNAL MEDICINE

## 2021-11-16 PROCEDURE — 90686 FLU VACCINE (QUAD) GREATER THAN OR EQUAL TO 3YO PRESERVATIVE FREE IM: ICD-10-PCS | Mod: S$GLB,,, | Performed by: INTERNAL MEDICINE

## 2021-11-16 PROCEDURE — G0008 FLU VACCINE (QUAD) GREATER THAN OR EQUAL TO 3YO PRESERVATIVE FREE IM: ICD-10-PCS | Mod: S$GLB,,, | Performed by: INTERNAL MEDICINE

## 2021-11-23 ENCOUNTER — PATIENT MESSAGE (OUTPATIENT)
Dept: OBSTETRICS AND GYNECOLOGY | Facility: CLINIC | Age: 51
End: 2021-11-23
Payer: MEDICARE

## 2021-11-23 DIAGNOSIS — Z90.12 H/O LEFT MASTECTOMY: Primary | ICD-10-CM

## 2022-01-13 ENCOUNTER — OFFICE VISIT (OUTPATIENT)
Dept: HEMATOLOGY/ONCOLOGY | Facility: CLINIC | Age: 52
End: 2022-01-13
Payer: MEDICARE

## 2022-01-13 ENCOUNTER — TELEPHONE (OUTPATIENT)
Dept: HEMATOLOGY/ONCOLOGY | Facility: CLINIC | Age: 52
End: 2022-01-13

## 2022-01-13 VITALS
TEMPERATURE: 96 F | OXYGEN SATURATION: 97 % | HEART RATE: 68 BPM | RESPIRATION RATE: 18 BRPM | HEIGHT: 65 IN | SYSTOLIC BLOOD PRESSURE: 163 MMHG | DIASTOLIC BLOOD PRESSURE: 108 MMHG | BODY MASS INDEX: 37.05 KG/M2 | WEIGHT: 222.38 LBS

## 2022-01-13 DIAGNOSIS — C50.912 INFILTRATING DUCTAL CARCINOMA OF LEFT BREAST, STAGE 2: Primary | ICD-10-CM

## 2022-01-13 DIAGNOSIS — R91.1 LUNG NODULE: ICD-10-CM

## 2022-01-13 DIAGNOSIS — Z79.811 AROMATASE INHIBITOR USE: ICD-10-CM

## 2022-01-13 PROCEDURE — 99215 PR OFFICE/OUTPT VISIT, EST, LEVL V, 40-54 MIN: ICD-10-PCS | Mod: S$GLB,,, | Performed by: INTERNAL MEDICINE

## 2022-01-13 PROCEDURE — 99215 OFFICE O/P EST HI 40 MIN: CPT | Mod: S$GLB,,, | Performed by: INTERNAL MEDICINE

## 2022-01-13 NOTE — PROGRESS NOTES
Medical Oncology Progress Note  [facility]     PATIENT: Cindi Magaña  : 1970 51 y.o. female  MRN [id number]     PCP: Kevin Aleman MD  Consult Requested By:      Date of Service: 2022    Subjective:   Interim History:    Infiltrating ductal carcinoma of left breast, stage 2    Cindi Magaña is here for f/up  Visit.    The patient has surgery and radiation therapy to the left breast at [facility] in 2020 without complications..  The patient here for follow-up visit and she has chronic pain she is on Arimidex 1 milligram p.o. daily.  She was previously being followed by Dr. Homero Vega.  I saw the patient for the 1st time today 2022    Oncology History:    Left Breast Invasive ductal carcinoma, grade 1, ER 93%, NJ 75%, HER-2 negative (score 0), Ki-67 8%, and ductal carcinoma in situ, grade 1, cribriform type with no necrosis and focally associated fine needed microcalcifications, very focal     Infiltrating duct carcinoma of upper inner quadrant of left female breast     ==10/18/2019 Initial Diagnosis       10/18/19 Screening Mmg: -new focal area of architectural distortion located near the 10 o'clock position of the middle left breast that is worrisome for malignancy.  No definite associated mass or suspicious calcification.    Left Breast US:   ==10:00 zone 2 position irregular hypoechoic shadowing mass correlates with mammographic abnormality and measures 1×1.4×1.4 cm.     ==2019 Biopsy   [facility]  Invasive ductal carcinoma, grade 1, ER 93%, NJ 75%, HER-2 negative (score 0), Ki-67 8%, and ductal carcinoma in situ, grade 1, cribriform type with no necrosis      [facility] Biopsies  Left Breast 9:00 core bx with Bard coil-shaped clip placement: INVASIVE DUCTAL CARCINOMA WITH FOCAL NEUROENDOCRINE DIFFERENTIATION, INTERMEDIATE NUCLEAR GRADE, JAYDON HISTOLOGIC GRADE 1, ER/NJ 95, HER2 negative (0)  Left axillary lymph node FNA with Dahlia  U-shaped clip placement: metastatic adenocarcinoma     ==1/30/2020 Lung Bx [facility]ASPECIMEN [facility]OURCEA. Lung, left upper lobe, FNA  No malignant cells identified  Rare bronchial epithelial cells present    ==3/2/2020 Staging form: Breast, AJCC 8th Edition  - Pathologic stage from 3/2/2020: Stage IB (pT3, pN1a, cM0, G2, ER+, OK+, HER2-) - Signed by Serafin Schneider MD on 3/3/2020  3/2020:   ==Oncotype DX recurrence score is 16 with distant recurrence risk at 9 years estimated to be 15% with hormonal therapy alone. There is no apparent benefit from chemotherapy. She will proceed with hormonal therapy alone with anastrozole 1 mg PO daily.   ==CT left lung nodule; per patient, this is old finding       Oncology History    No history exists.       Past Medical History:   Past Medical History:   Diagnosis Date    Breast cancer     Cancer     Heart disease     Hyperlipidemia     Hypertension     Kidney disease     Obesity     Rheumatoid arthritis involving multiple sites 2/27/2019    Stroke     Stroke        Past Surgical HIstory:   Past Surgical History:   Procedure Laterality Date    MASTECTOMY         Allergies:  Review of patient's allergies indicates:  No Known Allergies    Medications:  Current Outpatient Medications   Medication Sig Dispense Refill    anastrozole (ARIMIDEX) 1 mg Tab Take 1 tablet (1 mg total) by mouth once daily. 90 tablet 4    aspirin (ECOTRIN) 81 MG EC tablet Aspir-81 81 mg tablet,delayed release   Take 1 tablet every day by oral route.      benazepriL (LOTENSIN) 10 MG tablet Take 1 tablet (10 mg total) by mouth once daily. 90 tablet 3    carvediloL (COREG) 25 MG tablet Take 1 tablet (25 mg total) by mouth 2 (two) times daily with meals. 180 tablet 3    fluticasone propionate (FLONASE) 50 mcg/actuation nasal spray SHAKE LQ AND U 1 SPR IEN BID 3 Bottle 3    folic acid (FOLVITE) 1 MG tablet folic acid 1 mg tablet   TK 2 TS PO QD      furosemide (LASIX) 20 MG tablet furosemide 20 mg tablet       gabapentin (NEURONTIN) 300 MG capsule Take 600 mg by mouth.      HYDROcodone-acetaminophen (NORCO)  mg per tablet Take 1 tablet by mouth.      hydrOXYchloroQUINE (PLAQUENIL) 200 mg tablet Take 200 mg by mouth once daily.      mupirocin (BACTROBAN) 2 % ointment mupirocin 2 % topical ointment      pantoprazole (PROTONIX) 40 MG tablet TK 1 T PO QD 90 tablet 3    pantoprazole (PROTONIX) 40 MG tablet TK 1 T PO QD 90 tablet 3    pravastatin (PRAVACHOL) 80 MG tablet Take 1 tablet (80 mg total) by mouth every evening. 90 tablet 3    predniSONE (DELTASONE) 10 MG tablet Take 10 mg by mouth once daily.      tiZANidine (ZANAFLEX) 4 MG tablet Take 4 mg by mouth 3 (three) times daily.      venlafaxine (EFFEXOR-XR) 75 MG 24 hr capsule TK 1 C PO QD 90 capsule 3     No current facility-administered medications for this visit.       Family History:   Family History   Problem Relation Age of Onset    Hypertension Mother     Hypertension Father     Hypertension Sister     Lupus Sister     Hypertension Brother        Social History:  reports that she has quit smoking. She has never used smokeless tobacco. She reports previous alcohol use. She reports that she does not use drugs.    Review of Systemas    Constitutional: No change in weight, appetie, fatigue, fever, or night sweats  Eyes: No changes in vision  Ears, Nose, Mouth, Throat, and Face: No hearing problems, ear pain, rummy nose, or sore throat  Respiratory: No shortness of breath or cough  Cardiovascular: No chest pain, palpitations or dizziness  Gastrointestinal: No abdominal pain, hematochezia, melena  Genitourinary: No dysuria, hematuria, nocturia, menstrual problems, post menopausal  Integumentary/Breast: No rashes or itching  Hematologic/Lymphatic: No anemia or bleeding abnormalities  Musculoskeletal: No joint or muscle abnormalities  Neurological: No sensory or motor problems, no headaches  Behavioral/Psych: No depression, anxiety, cognitive  "problems, or stress  Endocrine: No diabetes or thyroid problem  Allergic/Immunologic: See allergy above    Physical Exam      Vitals:   Vitals:    01/13/22 0916   BP: (!) 163/108   Pulse: 68   Resp: 18   Temp: 96.3 °F (35.7 °C)   TempSrc: Temporal   SpO2: 97%   Weight: 100.9 kg (222 lb 6.4 oz)   Height: 5' 5" (1.651 m)     BMI: Body mass index is 37.01 kg/m².  BSA Body surface area is 2.15 meters squared.  ECOG Performance Status:   ECOG SCORE         GENERAL APPEARANCE: Well developed, well nourished, in no acute distress.  SKIN: Inspection of the skin reveals no rashes, ulcerations or petechiae.  HEENT: The sclerae were anicteric and conjunctivae were pink and moist. Extraocular movements were intact and pupils were equal, round, and reactive to light with normal accommodation. External inspection of the ears and nose showed no scars, lesions, or masses. Lips, teeth, and gums showed normal mucosa. The oral mucosa, hard and soft palate, tongue and posterior pharynx were normal.  NECK: Supple and symmetric. There was no thyroid enlargement, and no tenderness, or masses were felt.  CRESPIRATORY: Normal AP diameter and normal contour without any kyphoscoliosis. LUNGS: Auscultation of the lungs revealed normal breath sounds without any other adventitious sounds or rubs.  CARDIOVASCULAR: There was a regular rate and rhythm without any murmurs, gallops, rubs. The carotid pulses were normal and 2+ bilaterally without bruits. Peripheral pulses were 2+ and symmetric.  GASTROINTESTINAL: Soft and nontender with normal bowel sounds. The liver span was approximately 5-6 cm in the right midclavicular line by percussion. The liver edge was nontender. The spleen was not palpable. There were no inguinal or umbilical hernias noted. No ascites was noted.  LYMPH NODES: No lymphadenopathy was appreciated in the neck, axillae or groin.   MUSCULOSKELETAL: Gait was normal. There was no tenderness or effusions noted. Muscle strength and " tone were normal. EXTREMITIES: No cyanosis, clubbing. +ve for Lymphedema in left upper extremitie  NEUROLOGIC: Alert and oriented x 3. Normal affect. Gait was normal. Normal deep tendon reflexes with no pathological reflexes. Sensation to touch was normal.  PSYCHIATRIC: good mood, orientated to place, time and person     Labs and Imagings     Orders Only on 2021   Component Date Value Ref Range Status    CANCER ANTIGEN 27.29 2021 18.6  <=39.0 U/mL Final    Comment: INTERPRETIVE INFORMATION: Cancer Antigen 27.29The CA 27.29 assay is intended for use in monitorin) diseaseprogression and/or response to therapy in patients withmetastatic disease, and 2) disease recurrence in patientstreated previously for stages II   or III breast carcinoma who areclinically free of the disease. Serial testing in patients whoare clinically free of disease should be used in conjunctionwith other clinical methods for early detection of cancerrecurrence.Limitations: Patients with   confirmed breast carcinoma frequentlyhave CA 27.29 assay values in the same range as healthyindividuals.  Elevations may also be observed in patients withnon malignant disease. Results of this test must always beinterpreted in the context of morphologic   and other relevantdata, and should not be used alone for a diagnosis of malignancy.Methodology: Siemens Sina Weibo IM BR 27.29 (BR)  chemiluminescentimmunoassay was used. Results obtained with different assaymethods or kits cannot be used                              interchangeably.Performed By: [employer] [place] [address] [id number] Director: Dipika Sylvester MD     Orders Only on 2021   Component Date Value Ref Range Status    Cancer Antigen 15-3 2021 14  0 - 31 U/mL Final    Comment: INTERPRETIVE INFORMATION: Cancer Antigen-Breast ()The Roche CA 15-3 electrochemiluminescent immunoassay was used.Results obtained with different methods or kits  cannot be usedinterchangeably. The CA 15-3 test is used to aid in themanagement of   Stage II and III breast cancer patients. Serialtesting for patient CA 15-3 values should be used in conjunctionwith other clinical methods for monitoring breast cancer.Patients with confirmed breast carcinoma frequently have CA 15-3values in the same   range as healthy individuals. Elevations maybe observed in patients with nonmalignant disease. Therefore, Bari 15-3 value, regardless of level, should not be interpreted asabsolute evidence of the presence or absence of malignantdisease.Performed By: [facility] [place] [address] [id number] Director: Dipika Sylvester MD     Orders Only on 09/27/2021   Component Date Value Ref Range Status    WBC 09/27/2021 8.2  4.6 - 10.2 10*3/uL Final    RBC 09/27/2021 4.44  4.2 - 5.4 10*6/uL Final    Hemoglobin 09/27/2021 12.4  12.0 - 16.0 g/dL Final    Hematocrit 09/27/2021 39.5  37.0 - 47.0 % Final    MCV 09/27/2021 89.0  80 - 97 fL Final    MCH 09/27/2021 27.9  27.0 - 31.2 pg Final    MCHC 09/27/2021 31.4* 31.8 - 35.4 g/dL Final    RDW RBC Auto-Rto 09/27/2021 14.6  12.5 - 18.0 % Final    Platelets 09/27/2021 179  142 - 424 10*3/uL Final    Neutrophils 09/27/2021 67.5  37 - 80 % Final    Lymphocytes 09/27/2021 19.1* 25 - 40 % Final    Monocytes 09/27/2021 12.3  1 - 15 % Final    Eosinophils 09/27/2021 0.5* 1 - 3 % Final    Basophils 09/27/2021 0.1  0 - 3 % Final    nRBC# 09/27/2021 0.0  % Final    Neutrophils Absolute 09/27/2021 5.56  1.8 - 7.7 10*3/uL Final   Orders Only on 09/27/2021   Component Date Value Ref Range Status    Sodium 09/27/2021 141  135 - 145 mmol/L Final    Potassium 09/27/2021 4.5  3.6 - 5.2 mmol/L Final    Chloride 09/27/2021 104  100 - 108 mmol/L Final    CO2 09/27/2021 32  21 - 32 mmol/L Final    Anion Gap 09/27/2021 5.0  3.0 - 11.0 mmol/L Final    BUN 09/27/2021 11  7 - 18 mg/dL Final    Creatinine 09/27/2021 1.32* 0.55 -  1.02 mg/dL Final    GFR ESTIMATION 09/27/2021 51* >60 Final               DESCRIPTION       GLOMERULAR FILTRATION RATE             NORMAL                     >60             KIDNEY  DISEASE           15-60             KIDNEY FAILURE             <15    BUN/Creatinine Ratio 09/27/2021 8.33  7 - 18 Ratio Final    Glucose 09/27/2021 88  70 - 110 mg/dL Final    Calcium 09/27/2021 8.8  8.8 - 10.5 mg/dL Final    Total Bilirubin 09/27/2021 0.4  0.0 - 1.0 mg/dL Final    AST 09/27/2021 19  15 - 37 U/L Final    ALT 09/27/2021 17  12 - 78 U/L Final    Total Protein 09/27/2021 6.6  6.4 - 8.2 g/dL Final    Albumin 09/27/2021 3.2* 3.4 - 5.0 g/dL Final    Globulin 09/27/2021 3.4  2.3 - 3.5 g/dL Final    Albumin/Globulin Ratio 09/27/2021 0.941* 1.1 - 1.8 Ratio Final    Alkaline Phosphatase 09/27/2021 99  46 - 116 U/L Final         Assessment:     Principle Problem: Infiltrating ductal carcinoma of left breast, stage 2 [C50.912]   Co-morbidity/Active Problems:   Patient Active Problem List   Diagnosis    Rheumatoid arthritis involving multiple sites    Sjogren's syndrome    Inflammation of sacroiliac joint    Ankylosing spondylitis    Chronic renal impairment    Cerebrovascular accident-2011    Essential hypertension    Hyperlipidemia    Osteoarthritis of knee    Carpal tunnel syndrome    Anxiety    Gastroesophageal reflux disease without esophagitis    Allergic rhinitis    Lumbar radiculopathy, chronic    Class 1 obesity due to excess calories without serious comorbidity with body mass index (BMI) of 34.0 to 34.9 in adult    Infiltrating ductal carcinoma of left breast, stage 2        Cindi Magaña is a 51 y.o. female with PMH of The encounter diagnosis was Infiltrating ductal carcinoma of left breast, stage 2., with Infiltrating ductal carcinoma of left breast, stage 2 [C50.912]     ==============================================  Principal DX:  left segmental mastectomy and axillary node dissection.    Pathological findings shows large area of invasive ductal carcinoma, histological grade 2.  1 positive axillary node for metastasis.   pathological stage IB, histological grade 2, ER +, CO +, HER-2/indiana -, and Ki-67 low,left  breast cancer.   OncoType 16  S/p Radiation Oncology     Stage     Metastatic Sites node, ? Lung nodule . Patient reports this was biopsied at [facility] and was found to be non-malignant  Co-morbidities  RA      Plan:   Overall Plan:   Goal of Treatment:  Source of Guideline: NCCN version , Uptodate    To Do during this visit  Cont Arimidex  Tumor markers  Repeat CT scan chest to f/up lung nodule in Feb 2022    ==Labs: CBC CMP  ==Imaging Studies: CT scan chest, DEXA BD scan  ==Continue treatment with Arimidex  ==Return to Clinic with appointment in 6 weeks after CT scan and BD is completed  == Lymphedema sleeve    Total time spent in counseling and discussion was more than 45 minutes. I discussed in detail further management options at this time and discussion on relevant tests, imaging and ancillary tests as part of the management plan.

## 2022-01-13 NOTE — TELEPHONE ENCOUNTER
Faxed over [facility] to central scheduling. Previous sent a ct chest abdomen to central scheduling, but call central scheduling to disregard order per Dr. Diego.

## 2022-01-21 ENCOUNTER — TELEPHONE (OUTPATIENT)
Dept: HEMATOLOGY/ONCOLOGY | Facility: CLINIC | Age: 52
End: 2022-01-21
Payer: MEDICARE

## 2022-01-27 ENCOUNTER — OFFICE VISIT (OUTPATIENT)
Dept: FAMILY MEDICINE | Facility: CLINIC | Age: 52
End: 2022-01-27
Payer: MEDICARE

## 2022-01-27 VITALS
DIASTOLIC BLOOD PRESSURE: 92 MMHG | SYSTOLIC BLOOD PRESSURE: 148 MMHG | WEIGHT: 222.13 LBS | TEMPERATURE: 98 F | HEART RATE: 82 BPM | OXYGEN SATURATION: 97 % | BODY MASS INDEX: 37.01 KG/M2 | HEIGHT: 65 IN

## 2022-01-27 DIAGNOSIS — R91.1 LUNG NODULE: ICD-10-CM

## 2022-01-27 DIAGNOSIS — I10 ESSENTIAL HYPERTENSION: Primary | ICD-10-CM

## 2022-01-27 DIAGNOSIS — E78.2 MIXED HYPERLIPIDEMIA: ICD-10-CM

## 2022-01-27 DIAGNOSIS — F41.9 ANXIETY: ICD-10-CM

## 2022-01-27 DIAGNOSIS — M54.16 LUMBAR RADICULOPATHY, CHRONIC: ICD-10-CM

## 2022-01-27 DIAGNOSIS — I63.9 CEREBROVASCULAR ACCIDENT (CVA), UNSPECIFIED MECHANISM: ICD-10-CM

## 2022-01-27 DIAGNOSIS — M35.00 SJOGREN'S SYNDROME, WITH UNSPECIFIED ORGAN INVOLVEMENT: ICD-10-CM

## 2022-01-27 DIAGNOSIS — E66.01 SEVERE OBESITY (BMI 35.0-39.9) WITH COMORBIDITY: ICD-10-CM

## 2022-01-27 DIAGNOSIS — N18.31 STAGE 3A CHRONIC KIDNEY DISEASE: ICD-10-CM

## 2022-01-27 DIAGNOSIS — C50.912 INFILTRATING DUCTAL CARCINOMA OF LEFT BREAST, STAGE 2: ICD-10-CM

## 2022-01-27 DIAGNOSIS — K21.9 GASTROESOPHAGEAL REFLUX DISEASE WITHOUT ESOPHAGITIS: ICD-10-CM

## 2022-01-27 PROBLEM — E66.811 CLASS 1 OBESITY DUE TO EXCESS CALORIES WITHOUT SERIOUS COMORBIDITY WITH BODY MASS INDEX (BMI) OF 34.0 TO 34.9 IN ADULT: Status: RESOLVED | Noted: 2019-07-17 | Resolved: 2022-01-27

## 2022-01-27 PROBLEM — E66.09 CLASS 1 OBESITY DUE TO EXCESS CALORIES WITHOUT SERIOUS COMORBIDITY WITH BODY MASS INDEX (BMI) OF 34.0 TO 34.9 IN ADULT: Status: RESOLVED | Noted: 2019-07-17 | Resolved: 2022-01-27

## 2022-01-27 PROCEDURE — 1159F PR MEDICATION LIST DOCUMENTED IN MEDICAL RECORD: ICD-10-PCS | Mod: CPTII,S$GLB,, | Performed by: INTERNAL MEDICINE

## 2022-01-27 PROCEDURE — 1159F MED LIST DOCD IN RCRD: CPT | Mod: CPTII,S$GLB,, | Performed by: INTERNAL MEDICINE

## 2022-01-27 PROCEDURE — 3080F PR MOST RECENT DIASTOLIC BLOOD PRESSURE >= 90 MM HG: ICD-10-PCS | Mod: CPTII,S$GLB,, | Performed by: INTERNAL MEDICINE

## 2022-01-27 PROCEDURE — 1160F PR REVIEW ALL MEDS BY PRESCRIBER/CLIN PHARMACIST DOCUMENTED: ICD-10-PCS | Mod: CPTII,S$GLB,, | Performed by: INTERNAL MEDICINE

## 2022-01-27 PROCEDURE — 3008F PR BODY MASS INDEX (BMI) DOCUMENTED: ICD-10-PCS | Mod: CPTII,S$GLB,, | Performed by: INTERNAL MEDICINE

## 2022-01-27 PROCEDURE — 99214 PR OFFICE/OUTPT VISIT, EST, LEVL IV, 30-39 MIN: ICD-10-PCS | Mod: S$GLB,,, | Performed by: INTERNAL MEDICINE

## 2022-01-27 PROCEDURE — 3077F PR MOST RECENT SYSTOLIC BLOOD PRESSURE >= 140 MM HG: ICD-10-PCS | Mod: CPTII,S$GLB,, | Performed by: INTERNAL MEDICINE

## 2022-01-27 PROCEDURE — 99214 OFFICE O/P EST MOD 30 MIN: CPT | Mod: S$GLB,,, | Performed by: INTERNAL MEDICINE

## 2022-01-27 PROCEDURE — 3077F SYST BP >= 140 MM HG: CPT | Mod: CPTII,S$GLB,, | Performed by: INTERNAL MEDICINE

## 2022-01-27 PROCEDURE — 1160F RVW MEDS BY RX/DR IN RCRD: CPT | Mod: CPTII,S$GLB,, | Performed by: INTERNAL MEDICINE

## 2022-01-27 PROCEDURE — 4010F PR ACE/ARB THEARPY RXD/TAKEN: ICD-10-PCS | Mod: CPTII,S$GLB,, | Performed by: INTERNAL MEDICINE

## 2022-01-27 PROCEDURE — 3008F BODY MASS INDEX DOCD: CPT | Mod: CPTII,S$GLB,, | Performed by: INTERNAL MEDICINE

## 2022-01-27 PROCEDURE — 3080F DIAST BP >= 90 MM HG: CPT | Mod: CPTII,S$GLB,, | Performed by: INTERNAL MEDICINE

## 2022-01-27 PROCEDURE — 4010F ACE/ARB THERAPY RXD/TAKEN: CPT | Mod: CPTII,S$GLB,, | Performed by: INTERNAL MEDICINE

## 2022-01-27 RX ORDER — PANTOPRAZOLE SODIUM 40 MG/1
TABLET, DELAYED RELEASE ORAL
Qty: 90 TABLET | Refills: 3 | Status: CANCELLED | OUTPATIENT
Start: 2022-01-27

## 2022-01-27 NOTE — PROGRESS NOTES
"Subjective:       Patient ID: Cindi Magaña is a 51 y.o. female.    Chief Complaint: Follow-up and Stiffness (States feel all over body)      HPI: Cindi comes in today for follow-up.  He has had a lot going on.  She still followed by Dr. Diego for her breast cancer.  There is also a spot on her lungs that they are following for potential metastatic disease.  She denies any chest pains or shortness of breath.  She did see Dr. Pham recently and had a lot of blood work done.  Were going to request that.  She also sees Dr. Kay who manages her renal insufficiency.  She has had no recent fever or chills or respiratory symptoms.  Follow-up blood pressure is 148/90 to.       Past Medical History:   Past Medical History:   Diagnosis Date    Breast cancer     Cancer     Heart disease     Hyperlipidemia     Hypertension     Kidney disease     Obesity     Rheumatoid arthritis involving multiple sites 2/27/2019    Stroke     Stroke        Past Surgical Historical:   Past Surgical History:   Procedure Laterality Date    MASTECTOMY          Vitals: BP (!) 148/92   Pulse 82   Temp 98.1 °F (36.7 °C) (Temporal)   Ht 5' 5" (1.651 m)   Wt 100.8 kg (222 lb 2 oz)   SpO2 97%   BMI 36.96 kg/m²      Medications:   Medication List with Changes/Refills   Current Medications    ANASTROZOLE (ARIMIDEX) 1 MG TAB    Take 1 tablet (1 mg total) by mouth once daily.    ASPIRIN (ECOTRIN) 81 MG EC TABLET    Aspir-81 81 mg tablet,delayed release   Take 1 tablet every day by oral route.    BENAZEPRIL (LOTENSIN) 10 MG TABLET    Take 1 tablet (10 mg total) by mouth once daily.    CARVEDILOL (COREG) 25 MG TABLET    Take 1 tablet (25 mg total) by mouth 2 (two) times daily with meals.    FLUTICASONE PROPIONATE (FLONASE) 50 MCG/ACTUATION NASAL SPRAY    SHAKE LQ AND U 1 SPR IEN BID    FOLIC ACID (FOLVITE) 1 MG TABLET    folic acid 1 mg tablet   TK 2 TS PO QD    FUROSEMIDE (LASIX) 20 MG TABLET    furosemide 20 mg tablet    " GABAPENTIN (NEURONTIN) 300 MG CAPSULE    Take 600 mg by mouth.    HYDROCODONE-ACETAMINOPHEN (NORCO)  MG PER TABLET    Take 1 tablet by mouth.    HYDROXYCHLOROQUINE (PLAQUENIL) 200 MG TABLET    Take 200 mg by mouth once daily.    MUPIROCIN (BACTROBAN) 2 % OINTMENT    mupirocin 2 % topical ointment    PANTOPRAZOLE (PROTONIX) 40 MG TABLET    TK 1 T PO QD    PANTOPRAZOLE (PROTONIX) 40 MG TABLET    TK 1 T PO QD    PRAVASTATIN (PRAVACHOL) 80 MG TABLET    Take 1 tablet (80 mg total) by mouth every evening.    TIZANIDINE (ZANAFLEX) 4 MG TABLET    Take 4 mg by mouth 3 (three) times daily.    VENLAFAXINE (EFFEXOR-XR) 75 MG 24 HR CAPSULE    TK 1 C PO QD   Discontinued Medications    PREDNISONE (DELTASONE) 10 MG TABLET    Take 10 mg by mouth once daily.        Past Social History:   Social History     Socioeconomic History    Marital status: Single   Tobacco Use    Smoking status: Former Smoker    Smokeless tobacco: Never Used   Substance and Sexual Activity    Alcohol use: Not Currently    Drug use: Never       Allergies: Review of patient's allergies indicates:  No Known Allergies     Family History:   Family History   Problem Relation Age of Onset    Hypertension Mother     Hypertension Father     Hypertension Sister     Lupus Sister     Hypertension Brother         Review of Systems:  Review of Systems   Respiratory: Negative for shortness of breath and wheezing.    Cardiovascular: Negative for chest pain and palpitations.   Gastrointestinal: Negative for abdominal pain, change in bowel habit and change in bowel habit.   Musculoskeletal: Negative for gait problem.   Neurological: Negative for dizziness and syncope.   Psychiatric/Behavioral: Negative for suicidal ideas.        Physical Exam:  Physical Exam  Constitutional:       Appearance: Normal appearance.   Cardiovascular:      Rate and Rhythm: Normal rate and regular rhythm.   Pulmonary:      Effort: Pulmonary effort is normal.      Breath sounds: Normal  breath sounds.   Abdominal:      General: Abdomen is flat.      Palpations: Abdomen is soft.   Skin:     General: Skin is warm and dry.   Neurological:      General: No focal deficit present.      Mental Status: She is alert and oriented to person, place, and time.   Psychiatric:         Mood and Affect: Mood normal.          Assessment/Plan:       Problem List Items Addressed This Visit        Neuro    Cerebrovascular accident-2011    Lumbar radiculopathy, chronic       Psychiatric    Anxiety       Pulmonary    Lung nodule       Cardiac/Vascular    Essential hypertension - Primary    Hyperlipidemia       Renal/    Stage 3a chronic kidney disease       Immunology/Multi System    Sjogren's syndrome       Oncology    Infiltrating ductal carcinoma of left breast, stage 2       Endocrine    Severe obesity (BMI 35.0-39.9) with comorbidity       GI    Gastroesophageal reflux disease without esophagitis               Follow up in about 6 months (around 7/27/2022).     Kevin Aleman

## 2022-01-27 NOTE — PATIENT INSTRUCTIONS
"We do not have an after hours answering service.  If you need medical assistance after hours or weekends and holidays, you will need to report to the nearest emergency room or urgent care.      If I have ordered any lab tests or imaging studies (Xray/MRI/CT Scan/ Ultrasound), you should receive a call with your results within  Seven business days.  If you do not , please call my office for results. I do not believe "No News is Good News" .    If you need prescription refills between appointments, please ask your pharmacist to send us a request to renew, as this is the most efficient way to get your medication refilled.  If needed, you can contact our office during business hours to request a renewal.   Your Blood Pressure was elevated on today's visit. It may be from nervousness of being in the doctors office. Please check some blood pressure reading outside of this office, at home, and call a few readings to our office.      "

## 2022-02-07 DIAGNOSIS — J30.9 ALLERGIC RHINITIS, UNSPECIFIED SEASONALITY, UNSPECIFIED TRIGGER: ICD-10-CM

## 2022-02-07 DIAGNOSIS — F41.9 ANXIETY: ICD-10-CM

## 2022-02-07 RX ORDER — FLUTICASONE PROPIONATE 50 MCG
SPRAY, SUSPENSION (ML) NASAL
Qty: 18.2 ML | Refills: 6 | Status: SHIPPED | OUTPATIENT
Start: 2022-02-07

## 2022-02-07 RX ORDER — HYDROXYCHLOROQUINE SULFATE 200 MG/1
200 TABLET, FILM COATED ORAL DAILY
Qty: 90 TABLET | Refills: 3 | Status: SHIPPED | OUTPATIENT
Start: 2022-02-07 | End: 2022-09-27

## 2022-02-07 RX ORDER — VENLAFAXINE HYDROCHLORIDE 75 MG/1
CAPSULE, EXTENDED RELEASE ORAL
Qty: 90 CAPSULE | Refills: 3 | Status: SHIPPED | OUTPATIENT
Start: 2022-02-07 | End: 2023-02-08 | Stop reason: SDUPTHER

## 2022-02-24 ENCOUNTER — PATIENT MESSAGE (OUTPATIENT)
Dept: HEMATOLOGY/ONCOLOGY | Facility: CLINIC | Age: 52
End: 2022-02-24
Payer: MEDICARE

## 2022-03-02 ENCOUNTER — PATIENT MESSAGE (OUTPATIENT)
Dept: HEMATOLOGY/ONCOLOGY | Facility: CLINIC | Age: 52
End: 2022-03-02
Payer: MEDICARE

## 2022-03-03 DIAGNOSIS — C50.912 INFILTRATING DUCTAL CARCINOMA OF LEFT BREAST, STAGE 2: Primary | ICD-10-CM

## 2022-03-09 ENCOUNTER — OFFICE VISIT (OUTPATIENT)
Dept: HEMATOLOGY/ONCOLOGY | Facility: CLINIC | Age: 52
End: 2022-03-09
Payer: MEDICARE

## 2022-03-09 ENCOUNTER — TELEPHONE (OUTPATIENT)
Dept: HEMATOLOGY/ONCOLOGY | Facility: CLINIC | Age: 52
End: 2022-03-09

## 2022-03-09 ENCOUNTER — CLINICAL SUPPORT (OUTPATIENT)
Dept: HEMATOLOGY/ONCOLOGY | Facility: CLINIC | Age: 52
End: 2022-03-09
Payer: MEDICARE

## 2022-03-09 VITALS
DIASTOLIC BLOOD PRESSURE: 102 MMHG | HEIGHT: 65 IN | HEART RATE: 82 BPM | BODY MASS INDEX: 37.43 KG/M2 | TEMPERATURE: 97 F | OXYGEN SATURATION: 97 % | WEIGHT: 224.63 LBS | SYSTOLIC BLOOD PRESSURE: 164 MMHG | RESPIRATION RATE: 18 BRPM

## 2022-03-09 DIAGNOSIS — C50.912 INFILTRATING DUCTAL CARCINOMA OF LEFT BREAST, STAGE 2: ICD-10-CM

## 2022-03-09 DIAGNOSIS — R91.1 LUNG NODULE: Primary | ICD-10-CM

## 2022-03-09 LAB
ALBUMIN SERPL BCP-MCNC: 3.2 G/DL (ref 3.4–5)
ALBUMIN/GLOBULIN RATIO: 1 RATIO (ref 1.1–1.8)
ALP SERPL-CCNC: 123 U/L (ref 46–116)
ALT SERPL W P-5'-P-CCNC: 17 U/L (ref 12–78)
ANION GAP SERPL CALC-SCNC: 6 MMOL/L (ref 3–11)
AST SERPL-CCNC: 20 U/L (ref 15–37)
BASOPHILS NFR BLD: 0.5 % (ref 0–3)
BILIRUB SERPL-MCNC: 0.1 MG/DL (ref 0–1)
BUN SERPL-MCNC: 16 MG/DL (ref 7–18)
BUN/CREAT SERPL: 12.9 RATIO (ref 7–18)
CALCIUM SERPL-MCNC: 9.2 MG/DL (ref 8.8–10.5)
CHLORIDE SERPL-SCNC: 104 MMOL/L (ref 100–108)
CO2 SERPL-SCNC: 31 MMOL/L (ref 21–32)
CREAT SERPL-MCNC: 1.24 MG/DL (ref 0.55–1.02)
EOSINOPHIL NFR BLD: 1.4 % (ref 1–3)
ERYTHROCYTE [DISTWIDTH] IN BLOOD BY AUTOMATED COUNT: 13.6 % (ref 12.5–18)
GFR ESTIMATION: 55
GLOBULIN: 3.2 G/DL (ref 2.3–3.5)
GLUCOSE SERPL-MCNC: 93 MG/DL (ref 70–110)
HCT VFR BLD AUTO: 42.2 % (ref 37–47)
HGB BLD-MCNC: 13.3 G/DL (ref 12–16)
LYMPHOCYTES NFR BLD: 24.9 % (ref 25–40)
MCH RBC QN AUTO: 28.1 PG (ref 27–31.2)
MCHC RBC AUTO-ENTMCNC: 31.5 G/DL (ref 31.8–35.4)
MCV RBC AUTO: 89.2 FL (ref 80–97)
MONOCYTES NFR BLD: 8 % (ref 1–15)
NEUTROPHILS # BLD AUTO: 5.17 10*3/UL (ref 1.8–7.7)
NEUTROPHILS NFR BLD: 64.6 % (ref 37–80)
NUCLEATED RED BLOOD CELLS: 0 %
PLATELETS: 213 10*3/UL (ref 142–424)
POTASSIUM SERPL-SCNC: 4.5 MMOL/L (ref 3.6–5.2)
PROT SERPL-MCNC: 6.4 G/DL (ref 6.4–8.2)
RBC # BLD AUTO: 4.73 10*6/UL (ref 4.2–5.4)
SODIUM BLD-SCNC: 141 MMOL/L (ref 135–145)
WBC # BLD: 8 10*3/UL (ref 4.6–10.2)

## 2022-03-09 PROCEDURE — 3008F PR BODY MASS INDEX (BMI) DOCUMENTED: ICD-10-PCS | Mod: CPTII,S$GLB,, | Performed by: INTERNAL MEDICINE

## 2022-03-09 PROCEDURE — 1159F MED LIST DOCD IN RCRD: CPT | Mod: CPTII,S$GLB,, | Performed by: INTERNAL MEDICINE

## 2022-03-09 PROCEDURE — 3080F DIAST BP >= 90 MM HG: CPT | Mod: CPTII,S$GLB,, | Performed by: INTERNAL MEDICINE

## 2022-03-09 PROCEDURE — 3077F SYST BP >= 140 MM HG: CPT | Mod: CPTII,S$GLB,, | Performed by: INTERNAL MEDICINE

## 2022-03-09 PROCEDURE — 3008F BODY MASS INDEX DOCD: CPT | Mod: CPTII,S$GLB,, | Performed by: INTERNAL MEDICINE

## 2022-03-09 PROCEDURE — 99215 PR OFFICE/OUTPT VISIT, EST, LEVL V, 40-54 MIN: ICD-10-PCS | Mod: S$GLB,,, | Performed by: INTERNAL MEDICINE

## 2022-03-09 PROCEDURE — 1159F PR MEDICATION LIST DOCUMENTED IN MEDICAL RECORD: ICD-10-PCS | Mod: CPTII,S$GLB,, | Performed by: INTERNAL MEDICINE

## 2022-03-09 PROCEDURE — 4010F PR ACE/ARB THEARPY RXD/TAKEN: ICD-10-PCS | Mod: CPTII,S$GLB,, | Performed by: INTERNAL MEDICINE

## 2022-03-09 PROCEDURE — 99215 OFFICE O/P EST HI 40 MIN: CPT | Mod: S$GLB,,, | Performed by: INTERNAL MEDICINE

## 2022-03-09 PROCEDURE — 3077F PR MOST RECENT SYSTOLIC BLOOD PRESSURE >= 140 MM HG: ICD-10-PCS | Mod: CPTII,S$GLB,, | Performed by: INTERNAL MEDICINE

## 2022-03-09 PROCEDURE — 3080F PR MOST RECENT DIASTOLIC BLOOD PRESSURE >= 90 MM HG: ICD-10-PCS | Mod: CPTII,S$GLB,, | Performed by: INTERNAL MEDICINE

## 2022-03-09 PROCEDURE — 4010F ACE/ARB THERAPY RXD/TAKEN: CPT | Mod: CPTII,S$GLB,, | Performed by: INTERNAL MEDICINE

## 2022-03-09 NOTE — PROGRESS NOTES
Medical Oncology Progress Note  [facility]     PATIENT: Cindi Magaña  : 1970 51 y.o. female  MRN [id number]     PCP: Kevin Aleman MD  Consult Requested By:      Date of Service: 3/9/2022    Subjective:   Interim History:    Infiltrating ductal carcinoma of left breast stage 2    Cindi Magaña is here for f/up  Visit.    The patient has surgery and radiation therapy to the left breast at [facility] in 2020 without complications..  The patient here for follow-up visit and she has chronic pain she is on Arimidex 1 milligram p.o. daily.  She was previously being followed by Dr. Homero Vega.  I saw the patient for the 1st time today 2022    Oncology History:    Left Breast Invasive ductal carcinoma, grade 1, ER 93%, WA 75%, HER-2 negative (score 0), Ki-67 8%, and ductal carcinoma in situ, grade 1, cribriform type with no necrosis and focally associated fine needed microcalcifications, very focal     Infiltrating duct carcinoma of upper inner quadrant of left female breast     ==10/18/2019 Initial Diagnosis       10/18/19 Screening Mmg: -new focal area of architectural distortion located near the 10 o'clock position of the middle left breast that is worrisome for malignancy.  No definite associated mass or suspicious calcification.    Left Breast US:   ==10:00 zone 2 position irregular hypoechoic shadowing mass correlates with mammographic abnormality and measures 1×1.4×1.4 cm.     ==2019 [facility]  Invasive ductal carcinoma, grade 1, ER 93%, WA 75%, HER-2 negative (score 0), Ki-67 8%, and ductal carcinoma in situ, grade 1, cribriform type with no necrosis      [facility] Biopsies  Left Breast 9:00 core bx with Bard coil-shaped clip placement: INVASIVE DUCTAL CARCINOMA WITH FOCAL NEUROENDOCRINE DIFFERENTIATION, INTERMEDIATE NUCLEAR GRADE, JAYDON HISTOLOGIC GRADE 1, ER/WA 95, HER2 negative (0)  Left axillary lymph node FNA with Dahlia  U-shaped clip placement: metastatic adenocarcinoma     ==1/30/2020 Lung Bx [facility]. Lung, left upper lobe, FNA  No malignant cells identified  Rare bronchial epithelial cells present    ==3/2/2020 Staging form: Breast, AJCC 8th Edition  - Pathologic stage from 3/2/2020: Stage IB (pT3, pN1a, cM0, G2, ER+, HI+, HER2-) - Signed by Serafin Schneider MD on 3/3/2020  3/2020:   ==Oncotype DX recurrence score is 16 with distant recurrence risk at 9 years estimated to be 15% with hormonal therapy alone. There is no apparent benefit from chemotherapy. She will proceed with hormonal therapy alone with anastrozole 1 mg PO daily.   ==CT left lung nodule; per patient, this is old finding       Oncology History    No history exists.       Past Medical History:   Past Medical History:   Diagnosis Date    Breast cancer     Cancer     Heart disease     Hyperlipidemia     Hypertension     Kidney disease     Obesity     Rheumatoid arthritis involving multiple sites 2/27/2019    Stroke     Stroke        Past Surgical HIstory:   Past Surgical History:   Procedure Laterality Date    MASTECTOMY         Allergies:  Review of patient's allergies indicates:  No Known Allergies    Medications:  Current Outpatient Medications   Medication Sig Dispense Refill    anastrozole (ARIMIDEX) 1 mg Tab Take 1 tablet (1 mg total) by mouth once daily. 90 tablet 4    aspirin (ECOTRIN) 81 MG EC tablet Aspir-81 81 mg tablet,delayed release   Take 1 tablet every day by oral route.      benazepriL (LOTENSIN) 10 MG tablet Take 1 tablet (10 mg total) by mouth once daily. 90 tablet 3    carvediloL (COREG) 25 MG tablet Take 1 tablet (25 mg total) by mouth 2 (two) times daily with meals. 180 tablet 3    fluticasone propionate (FLONASE) 50 mcg/actuation nasal spray SHAKE LQ AND U 1 SPR IEN BID 18.2 mL 6    folic acid (FOLVITE) 1 MG tablet folic acid 1 mg tablet   TK 2 TS PO QD      furosemide (LASIX) 20 MG tablet furosemide 20 mg tablet       gabapentin (NEURONTIN) 300 MG capsule Take 600 mg by mouth.      HYDROcodone-acetaminophen (NORCO)  mg per tablet Take 1 tablet by mouth.      hydrOXYchloroQUINE (PLAQUENIL) 200 mg tablet Take 1 tablet (200 mg total) by mouth once daily. 90 tablet 3    mupirocin (BACTROBAN) 2 % ointment mupirocin 2 % topical ointment      pantoprazole (PROTONIX) 40 MG tablet TK 1 T PO QD 90 tablet 3    pantoprazole (PROTONIX) 40 MG tablet TK 1 T PO QD 90 tablet 3    pravastatin (PRAVACHOL) 80 MG tablet Take 1 tablet (80 mg total) by mouth every evening. 90 tablet 3    tiZANidine (ZANAFLEX) 4 MG tablet Take 4 mg by mouth 3 (three) times daily.      venlafaxine (EFFEXOR-XR) 75 MG 24 hr capsule TK 1 C PO QD 90 capsule 3     No current facility-administered medications for this visit.       Family History:   Family History   Problem Relation Age of Onset    Hypertension Mother     Hypertension Father     Hypertension Sister     Lupus Sister     Hypertension Brother        Social History:  reports that she has quit smoking. She has never used smokeless tobacco. She reports previous alcohol use. She reports that she does not use drugs.    Review of Systemas    Constitutional: No change in weight, appetie, fatigue, fever, or night sweats  Eyes: No changes in vision  Ears, Nose, Mouth, Throat, and Face: No hearing problems, ear pain, rummy nose, or sore throat  Respiratory: No shortness of breath or cough  Cardiovascular: No chest pain, palpitations or dizziness  Gastrointestinal: No abdominal pain, hematochezia, melena  Genitourinary: No dysuria, hematuria, nocturia, menstrual problems, post menopausal  Integumentary/Breast: No rashes or itching  Hematologic/Lymphatic: No anemia or bleeding abnormalities  Musculoskeletal: No joint or muscle abnormalities  Neurological: No sensory or motor problems, no headaches  Behavioral/Psych: No depression, anxiety, cognitive problems, or stress  Endocrine: No diabetes or thyroid  "problem  Allergic/Immunologic: See allergy above    Physical Exam      Vitals:   Vitals:    03/09/22 1005   BP: (!) 164/102   Pulse: 82   Resp: 18   Temp: 96.6 °F (35.9 °C)   TempSrc: Temporal   SpO2: 97%   Weight: 101.9 kg (224 lb 9.6 oz)   Height: 5' 5" (1.651 m)     BMI: Body mass index is 37.38 kg/m².  BSA Body surface area is 2.16 meters squared.  ECOG Performance Status:   ECOG SCORE         GENERAL APPEARANCE: Well developed, well nourished, in no acute distress.  SKIN: Inspection of the skin reveals no rashes, ulcerations or petechiae.  HEENT: The sclerae were anicteric and conjunctivae were pink and moist. Extraocular movements were intact and pupils were equal, round, and reactive to light with normal accommodation. External inspection of the ears and nose showed no scars, lesions, or masses. Lips, teeth, and gums showed normal mucosa. The oral mucosa, hard and soft palate, tongue and posterior pharynx were normal.  NECK: Supple and symmetric. There was no thyroid enlargement, and no tenderness, or masses were felt.  CRESPIRATORY: Normal AP diameter and normal contour without any kyphoscoliosis. LUNGS: Auscultation of the lungs revealed normal breath sounds without any other adventitious sounds or rubs.  CARDIOVASCULAR: There was a regular rate and rhythm without any murmurs, gallops, rubs. The carotid pulses were normal and 2+ bilaterally without bruits. Peripheral pulses were 2+ and symmetric.  GASTROINTESTINAL: Soft and nontender with normal bowel sounds. The liver span was approximately 5-6 cm in the right midclavicular line by percussion. The liver edge was nontender. The spleen was not palpable. There were no inguinal or umbilical hernias noted. No ascites was noted.  LYMPH NODES: No lymphadenopathy was appreciated in the neck, axillae or groin.   MUSCULOSKELETAL: Gait was normal. There was no tenderness or effusions noted. Muscle strength and tone were normal. EXTREMITIES: No cyanosis, clubbing. +ve " for Lymphedema in left upper extremitie  NEUROLOGIC: Alert and oriented x 3. Normal affect. Gait was normal. Normal deep tendon reflexes with no pathological reflexes. Sensation to touch was normal.  PSYCHIATRIC: good mood, orientated to place, time and person     Labs and Imagings     Clinical Support on 03/09/2022   Component Date Value Ref Range Status    WBC 03/09/2022 8.0  4.6 - 10.2 10*3/uL Final    RBC 03/09/2022 4.73  4.2 - 5.4 10*6/uL Final    Hemoglobin 03/09/2022 13.3  12.0 - 16.0 g/dL Final    Hematocrit 03/09/2022 42.2  37.0 - 47.0 % Final    MCV 03/09/2022 89.2  80 - 97 fL Final    MCH 03/09/2022 28.1  27.0 - 31.2 pg Final    MCHC 03/09/2022 31.5 (A) 31.8 - 35.4 g/dL Final    RDW RBC Auto-Rto 03/09/2022 13.6  12.5 - 18.0 % Final    Platelets 03/09/2022 213  142 - 424 10*3/uL Final    Neutrophils 03/09/2022 64.6  37 - 80 % Final    Lymphocytes 03/09/2022 24.9 (A) 25 - 40 % Final    Monocytes 03/09/2022 8.0  1 - 15 % Final    Eosinophils 03/09/2022 1.4  1 - 3 % Final    Basophils 03/09/2022 0.5  0 - 3 % Final    nRBC# 03/09/2022 0.0  % Final    Neutrophils Absolute 03/09/2022 5.17  1.8 - 7.7 10*3/uL Final    Sodium 03/09/2022 141  135 - 145 mmol/L Final    Potassium 03/09/2022 4.5  3.6 - 5.2 mmol/L Final    Chloride 03/09/2022 104  100 - 108 mmol/L Final    CO2 03/09/2022 31  21 - 32 mmol/L Final    Anion Gap 03/09/2022 6.0  3.0 - 11.0 mmol/L Final    BUN 03/09/2022 16  7 - 18 mg/dL Final    Creatinine 03/09/2022 1.24 (A) 0.55 - 1.02 mg/dL Final    GFR ESTIMATION 03/09/2022 55 (A) >60 Final               DESCRIPTION       GLOMERULAR FILTRATION RATE             NORMAL                     >60             KIDNEY  DISEASE           15-60             KIDNEY FAILURE             <15    BUN/Creatinine Ratio 03/09/2022 12.90  7 - 18 Ratio Final    Glucose 03/09/2022 93  70 - 110 mg/dL Final    Calcium 03/09/2022 9.2  8.8 - 10.5 mg/dL Final    Total Bilirubin 03/09/2022 0.1  0.0 - 1.0  mg/dL Final    AST 2022 20  15 - 37 U/L Final    ALT 2022 17  12 - 78 U/L Final    Total Protein 2022 6.4  6.4 - 8.2 g/dL Final    Albumin 2022 3.2 (A) 3.4 - 5.0 g/dL Final    Globulin 2022 3.2  2.3 - 3.5 g/dL Final    Albumin/Globulin Ratio 2022 1.000 (A) 1.1 - 1.8 Ratio Final    Alkaline Phosphatase 2022 123 (A) 46 - 116 U/L Final   Orders Only on 2021   Component Date Value Ref Range Status    CANCER ANTIGEN 27.29 2021 18.6  <=39.0 U/mL Final    Comment: INTERPRETIVE INFORMATION: Cancer Antigen 27.29The CA 27.29 assay is intended for use in monitorin) diseaseprogression and/or response to therapy in patients withmetastatic disease, and 2) disease recurrence in patientstreated previously for stages II   or III breast carcinoma who areclinically free of the disease. Serial testing in patients whoare clinically free of disease should be used in conjunctionwith other clinical methods for early detection of cancerrecurrence.Limitations: Patients with   confirmed breast carcinoma frequentlyhave CA 27.29 assay values in the same range as healthyindividuals.  Elevations may also be observed in patients withnon malignant disease. Results of this test must always beinterpreted in the context of morphologic   and other relevantdata, and should not be used alone for a diagnosis of malignancy.Methodology: Siemens Atellica IM BR 27.29 (BR)  chemiluminescentimmunoassay was used. Results obtained with different assaymethods or kits cannot be used                              interchangeably.Performed By: [facility] [place] [id number] Director: Dipika Sylvester MD     Orders Only on 2021   Component Date Value Ref Range Status    Cancer Antigen 15-3 2021 14  0 - 31 U/mL Final    Comment: INTERPRETIVE INFORMATION: Cancer Antigen-Breast ()The Roche CA 15-3 electrochemiluminescent immunoassay was used.Results  obtained with different methods or kits cannot be usedinterchangeably. The CA 15-3 test is used to aid in themanagement of   Stage II and III breast cancer patients. Serialtesting for patient CA 15-3 values should be used in conjunctionwith other clinical methods for monitoring breast cancer.Patients with confirmed breast carcinoma frequently have CA 15-3values in the same   range as healthy individuals. Elevations maybe observed in patients with nonmalignant disease. Therefore, Bari 15-3 value, regardless of level, should not be interpreted asabsolute evidence of the presence or absence of malignantdisease.Performed By: [facility] [place] [id number] Director: Dipika Sylvester MD     Orders Only on 09/27/2021   Component Date Value Ref Range Status    WBC 09/27/2021 8.2  4.6 - 10.2 10*3/uL Final    RBC 09/27/2021 4.44  4.2 - 5.4 10*6/uL Final    Hemoglobin 09/27/2021 12.4  12.0 - 16.0 g/dL Final    Hematocrit 09/27/2021 39.5  37.0 - 47.0 % Final    MCV 09/27/2021 89.0  80 - 97 fL Final    MCH 09/27/2021 27.9  27.0 - 31.2 pg Final    MCHC 09/27/2021 31.4 (A) 31.8 - 35.4 g/dL Final    RDW RBC Auto-Rto 09/27/2021 14.6  12.5 - 18.0 % Final    Platelets 09/27/2021 179  142 - 424 10*3/uL Final    Neutrophils 09/27/2021 67.5  37 - 80 % Final    Lymphocytes 09/27/2021 19.1 (A) 25 - 40 % Final    Monocytes 09/27/2021 12.3  1 - 15 % Final    Eosinophils 09/27/2021 0.5 (A) 1 - 3 % Final    Basophils 09/27/2021 0.1  0 - 3 % Final    nRBC# 09/27/2021 0.0  % Final    Neutrophils Absolute 09/27/2021 5.56  1.8 - 7.7 10*3/uL Final   Orders Only on 09/27/2021   Component Date Value Ref Range Status    Sodium 09/27/2021 141  135 - 145 mmol/L Final    Potassium 09/27/2021 4.5  3.6 - 5.2 mmol/L Final    Chloride 09/27/2021 104  100 - 108 mmol/L Final    CO2 09/27/2021 32  21 - 32 mmol/L Final    Anion Gap 09/27/2021 5.0  3.0 - 11.0 mmol/L Final    BUN 09/27/2021 11  7 - 18  mg/dL Final    Creatinine 09/27/2021 1.32 (A) 0.55 - 1.02 mg/dL Final    GFR ESTIMATION 09/27/2021 51 (A) >60 Final               DESCRIPTION       GLOMERULAR FILTRATION RATE             NORMAL                     >60             KIDNEY  DISEASE           15-60             KIDNEY FAILURE             <15    BUN/Creatinine Ratio 09/27/2021 8.33  7 - 18 Ratio Final    Glucose 09/27/2021 88  70 - 110 mg/dL Final    Calcium 09/27/2021 8.8  8.8 - 10.5 mg/dL Final    Total Bilirubin 09/27/2021 0.4  0.0 - 1.0 mg/dL Final    AST 09/27/2021 19  15 - 37 U/L Final    ALT 09/27/2021 17  12 - 78 U/L Final    Total Protein 09/27/2021 6.6  6.4 - 8.2 g/dL Final    Albumin 09/27/2021 3.2 (A) 3.4 - 5.0 g/dL Final    Globulin 09/27/2021 3.4  2.3 - 3.5 g/dL Final    Albumin/Globulin Ratio 09/27/2021 0.941 (A) 1.1 - 1.8 Ratio Final    Alkaline Phosphatase 09/27/2021 99  46 - 116 U/L Final         Assessment:     Principle Problem: No primary diagnosis found.   Co-morbidity/Active Problems:   Patient Active Problem List   Diagnosis    Rheumatoid arthritis involving multiple sites    Sjogren's syndrome    Inflammation of sacroiliac joint    Ankylosing spondylitis    Chronic renal impairment    Cerebrovascular accident-2011    Essential hypertension    Hyperlipidemia    Osteoarthritis of knee    Carpal tunnel syndrome    Anxiety    Gastroesophageal reflux disease without esophagitis    Allergic rhinitis    Lumbar radiculopathy, chronic    Infiltrating ductal carcinoma of left breast, stage 2    Lung nodule    Stage 3a chronic kidney disease    Severe obesity (BMI 35.0-39.9) with comorbidity        Cindi Magaña is a 51 y.o. female with PMH of There were no encounter diagnoses., with No primary diagnosis found.     ==============================================  Principal DX:  left segmental mastectomy and axillary node dissection.   Pathological findings shows large area of invasive ductal carcinoma,  histological grade 2.  1 positive axillary node for metastasis.   pathological stage IB, histological grade 2, ER +, TN +, HER-2/ind[place] -, and Ki-67 low,left  breast cancer.   OncoType 16  S/p Radiation Oncology     Stage     Metastatic Sites node, ? Lung nodule . Patient reports this was biopsied at [facility] and was found to be non-malignant  Co-morbidities  RA      Plan:   Overall Plan:   Goal of Treatment:  Source of Guideline: NCCN version , Uptodate    To Do during this visit  Cont Arimidex  Repeat CT scan chest to f/up lung nodule     ==Labs: CBC CMP  ==Imaging Studies: CT scan chest  ==Continue treatment with Arimidex  ==Return to Clinic with appointment in 3 months after CT scan is completed. She can come sooner to discuss these results if she has questions/concerns  == Continue  Lymphedema sleeve    Total time spent in counseling and discussion was more than 60 minutes. I discussed in detail further management options at this time and discussion on relevant tests, imaging and ancillary tests as part of the management plan.

## 2022-06-07 DIAGNOSIS — C50.912 INFILTRATING DUCTAL CARCINOMA OF LEFT BREAST, STAGE 2: ICD-10-CM

## 2022-06-07 DIAGNOSIS — R91.1 LUNG NODULE: Primary | ICD-10-CM

## 2022-08-11 ENCOUNTER — PATIENT MESSAGE (OUTPATIENT)
Dept: OBSTETRICS AND GYNECOLOGY | Facility: CLINIC | Age: 52
End: 2022-08-11
Payer: MEDICARE

## 2022-08-16 ENCOUNTER — OFFICE VISIT (OUTPATIENT)
Dept: HEMATOLOGY/ONCOLOGY | Facility: CLINIC | Age: 52
End: 2022-08-16
Payer: MEDICARE

## 2022-08-16 VITALS
RESPIRATION RATE: 18 BRPM | WEIGHT: 221 LBS | DIASTOLIC BLOOD PRESSURE: 85 MMHG | OXYGEN SATURATION: 96 % | SYSTOLIC BLOOD PRESSURE: 127 MMHG | HEIGHT: 65 IN | BODY MASS INDEX: 36.82 KG/M2 | HEART RATE: 63 BPM

## 2022-08-16 DIAGNOSIS — C50.412 MALIGNANT NEOPLASM OF UPPER-OUTER QUADRANT OF LEFT BREAST IN FEMALE, ESTROGEN RECEPTOR POSITIVE: ICD-10-CM

## 2022-08-16 DIAGNOSIS — Z17.0 MALIGNANT NEOPLASM OF UPPER-OUTER QUADRANT OF LEFT BREAST IN FEMALE, ESTROGEN RECEPTOR POSITIVE: ICD-10-CM

## 2022-08-16 DIAGNOSIS — Z12.31 ENCOUNTER FOR SCREENING MAMMOGRAM FOR MALIGNANT NEOPLASM OF BREAST: ICD-10-CM

## 2022-08-16 DIAGNOSIS — C50.912 INFILTRATING DUCTAL CARCINOMA OF LEFT BREAST, STAGE 2: ICD-10-CM

## 2022-08-16 DIAGNOSIS — Z79.811 AROMATASE INHIBITOR USE: ICD-10-CM

## 2022-08-16 DIAGNOSIS — R91.1 LUNG NODULE: Primary | ICD-10-CM

## 2022-08-16 PROCEDURE — 4010F PR ACE/ARB THEARPY RXD/TAKEN: ICD-10-PCS | Mod: CPTII,S$GLB,, | Performed by: NURSE PRACTITIONER

## 2022-08-16 PROCEDURE — 99214 PR OFFICE/OUTPT VISIT, EST, LEVL IV, 30-39 MIN: ICD-10-PCS | Mod: S$GLB,,, | Performed by: NURSE PRACTITIONER

## 2022-08-16 PROCEDURE — 1159F PR MEDICATION LIST DOCUMENTED IN MEDICAL RECORD: ICD-10-PCS | Mod: CPTII,S$GLB,, | Performed by: NURSE PRACTITIONER

## 2022-08-16 PROCEDURE — 3079F PR MOST RECENT DIASTOLIC BLOOD PRESSURE 80-89 MM HG: ICD-10-PCS | Mod: CPTII,S$GLB,, | Performed by: NURSE PRACTITIONER

## 2022-08-16 PROCEDURE — 99214 OFFICE O/P EST MOD 30 MIN: CPT | Mod: S$GLB,,, | Performed by: NURSE PRACTITIONER

## 2022-08-16 PROCEDURE — 3079F DIAST BP 80-89 MM HG: CPT | Mod: CPTII,S$GLB,, | Performed by: NURSE PRACTITIONER

## 2022-08-16 PROCEDURE — 3008F BODY MASS INDEX DOCD: CPT | Mod: CPTII,S$GLB,, | Performed by: NURSE PRACTITIONER

## 2022-08-16 PROCEDURE — 3074F PR MOST RECENT SYSTOLIC BLOOD PRESSURE < 130 MM HG: ICD-10-PCS | Mod: CPTII,S$GLB,, | Performed by: NURSE PRACTITIONER

## 2022-08-16 PROCEDURE — 1159F MED LIST DOCD IN RCRD: CPT | Mod: CPTII,S$GLB,, | Performed by: NURSE PRACTITIONER

## 2022-08-16 PROCEDURE — 3008F PR BODY MASS INDEX (BMI) DOCUMENTED: ICD-10-PCS | Mod: CPTII,S$GLB,, | Performed by: NURSE PRACTITIONER

## 2022-08-16 PROCEDURE — 4010F ACE/ARB THERAPY RXD/TAKEN: CPT | Mod: CPTII,S$GLB,, | Performed by: NURSE PRACTITIONER

## 2022-08-16 PROCEDURE — 3074F SYST BP LT 130 MM HG: CPT | Mod: CPTII,S$GLB,, | Performed by: NURSE PRACTITIONER

## 2022-08-16 NOTE — PROGRESS NOTES
Medical Oncology Progress Note  [facility]     PATIENT: Cindi Magaña  : 1970 51 y.o. female  MRN [id number]     PCP: Kevin Aleman MD  Consult Requested By:      Date of Service: 2022    Subjective:   Interim History:    MD Infiltrating ductal carcinoma of left breast, stage 2     Cindi Magaña is here for f/up  Visit.    The patient has surgery and radiation therapy to the left breast at [facility] in 2020 without complications..  The patient here for follow-up visit and she has chronic pain she is on Arimidex 1 milligram p.o. daily.  She was previously being followed by Dr. Homero Vega then Dr. Diego 2022.    Oncology History:    Left Breast Invasive ductal carcinoma, grade 1, ER 93%, IN 75%, HER-2 negative (score 0), Ki-67 8%, and ductal carcinoma in situ, grade 1, cribriform type with no necrosis and focally associated fine needed microcalcifications, very focal     Infiltrating duct carcinoma of upper inner quadrant of left female breast     ==10/18/2019 Initial Diagnosis       10/18/19 Screening Mmg: -new focal area of architectural distortion located near the 10 o'clock position of the middle left breast that is worrisome for malignancy.  No definite associated mass or suspicious calcification.    Left Breast US:   ==10:00 zone 2 position irregular hypoechoic shadowing mass correlates with mammographic abnormality and measures 1×1.4×1.4 cm.     ==2019 Biopsy   [facility]  Invasive ductal carcinoma, grade 1, ER 93%, IN 75%, HER-2 negative (score 0), Ki-67 8%, and ductal carcinoma in situ, grade 1, cribriform type with no necrosis      [facility] Biopsies  Left Breast 9:00 core bx with Bard coil-shaped clip placement: INVASIVE DUCTAL CARCINOMA WITH FOCAL NEUROENDOCRINE DIFFERENTIATION, INTERMEDIATE NUCLEAR GRADE, JAYDON HISTOLOGIC GRADE 1, ER/IN 95, HER2 negative (0)  Left axillary lymph node FNA with [place] U-shaped clip  placement: metastatic adenocarcinoma     ==1/30/2020 Lung Bx MDASPECIMEN SOURCEA. Lung, left upper lobe, FNA  No malignant cells identified  Rare bronchial epithelial cells present    ==3/2/2020 Staging form: Breast, AJCC 8th Edition  - Pathologic stage from 3/2/2020: Stage IB (pT3, pN1a, cM0, G2, ER+, DE+, HER2-) - Signed by Serafin Schneider MD on 3/3/2020  3/2020:   ==Oncotype DX recurrence score is 16 with distant recurrence risk at 9 years estimated to be 15% with hormonal therapy alone. There is no apparent benefit from chemotherapy. She will proceed with hormonal therapy alone with anastrozole 1 mg PO daily.   ==CT left lung nodule; per patient, this is old finding  ==08/06/2022: CT chest 8mm pulmonary nodule of the lingula with slight interval decrease in   size. There is questionable calcification within the central aspect of this    nodule.     Oncology History    No history exists.       Past Medical History:   Past Medical History:   Diagnosis Date    Breast cancer     Cancer     Heart disease     Hyperlipidemia     Hypertension     Kidney disease     Obesity     Rheumatoid arthritis involving multiple sites 2/27/2019    Stroke     Stroke        Past Surgical HIstory:   Past Surgical History:   Procedure Laterality Date    MASTECTOMY         Allergies:  Review of patient's allergies indicates:  No Known Allergies    Medications:  Current Outpatient Medications   Medication Sig Dispense Refill    anastrozole (ARIMIDEX) 1 mg Tab Take 1 tablet (1 mg total) by mouth once daily. 90 tablet 4    aspirin (ECOTRIN) 81 MG EC tablet Aspir-81 81 mg tablet,delayed release   Take 1 tablet every day by oral route.      benazepriL (LOTENSIN) 10 MG tablet Take 1 tablet (10 mg total) by mouth once daily. 90 tablet 3    carvediloL (COREG) 25 MG tablet Take 1 tablet (25 mg total) by mouth 2 (two) times daily with meals. 180 tablet 3    fluticasone propionate (FLONASE) 50 mcg/actuation nasal spray SHAKE LQ AND  U 1 SPR IEN BID 18.2 mL 6    folic acid (FOLVITE) 1 MG tablet folic acid 1 mg tablet   TK 2 TS PO QD      furosemide (LASIX) 20 MG tablet furosemide 20 mg tablet      gabapentin (NEURONTIN) 300 MG capsule Take 600 mg by mouth.      HYDROcodone-acetaminophen (NORCO)  mg per tablet Take 1 tablet by mouth.      hydrOXYchloroQUINE (PLAQUENIL) 200 mg tablet Take 1 tablet (200 mg total) by mouth once daily. 90 tablet 3    mupirocin (BACTROBAN) 2 % ointment mupirocin 2 % topical ointment      pantoprazole (PROTONIX) 40 MG tablet TK 1 T PO QD 90 tablet 3    pantoprazole (PROTONIX) 40 MG tablet TK 1 T PO QD 90 tablet 3    pravastatin (PRAVACHOL) 80 MG tablet TAKE 1 TABLET(80 MG) BY MOUTH EVERY EVENING 90 tablet 3    tiZANidine (ZANAFLEX) 4 MG tablet Take 4 mg by mouth 3 (three) times daily.      venlafaxine (EFFEXOR-XR) 75 MG 24 hr capsule TK 1 C PO QD 90 capsule 3     No current facility-administered medications for this visit.       Family History:   Family History   Problem Relation Age of Onset    Hypertension Mother     Hypertension Father     Hypertension Sister     Lupus Sister     Hypertension Brother        Social History:  reports that she has quit smoking. She has never used smokeless tobacco. She reports previous alcohol use. She reports that she does not use drugs.    Review of Systemas    Constitutional: No change in weight, appetie, fatigue, fever, or night sweats  Eyes: No changes in vision  Ears, Nose, Mouth, Throat, and Face: No hearing problems, ear pain, rummy nose, or sore throat  Respiratory: No shortness of breath or cough  Cardiovascular: No chest pain, palpitations or dizziness  Gastrointestinal: No abdominal pain, hematochezia, melena  Genitourinary: No dysuria, hematuria, nocturia, menstrual problems, post menopausal  Integumentary/Breast: No rashes or itching  Hematologic/Lymphatic: No anemia or bleeding abnormalities  Musculoskeletal: No joint or muscle  abnormalities  Neurological: No sensory or motor problems, no headaches  Behavioral/Psych: No depression, anxiety, cognitive problems, or stress  Endocrine: No diabetes or thyroid problem  Allergic/Immunologic: See allergy above    Physical Exam      Vitals:   There were no vitals filed for this visit.  BMI: There is no height or weight on file to calculate BMI.  BSA There is no height or weight on file to calculate BSA.  ECOG Performance Status:   ECOG SCORE         GENERAL APPEARANCE: Well developed, well nourished, in no acute distress.  SKIN: Inspection of the skin reveals no rashes, ulcerations or petechiae.  HEENT: The sclerae were anicteric and conjunctivae were pink and moist. Extraocular movements were intact and pupils were equal, round, and reactive to light with normal accommodation. External inspection of the ears and nose showed no scars, lesions, or masses. Lips, teeth, and gums showed normal mucosa. The oral mucosa, hard and soft palate, tongue and posterior pharynx were normal.  NECK: Supple and symmetric. There was no thyroid enlargement, and no tenderness, or masses were felt.  CRESPIRATORY: Normal AP diameter and normal contour without any kyphoscoliosis. LUNGS: Auscultation of the lungs revealed normal breath sounds without any other adventitious sounds or rubs.  CARDIOVASCULAR: There was a regular rate and rhythm without any murmurs, gallops, rubs. The carotid pulses were normal and 2+ bilaterally without bruits. Peripheral pulses were 2+ and symmetric.  GASTROINTESTINAL: Soft and nontender with normal bowel sounds. The liver span was approximately 5-6 cm in the right midclavicular line by percussion. The liver edge was nontender. The spleen was not palpable. There were no inguinal or umbilical hernias noted. No ascites was noted.  LYMPH NODES: No lymphadenopathy was appreciated in the neck, axillae or groin.   MUSCULOSKELETAL: Gait was normal. There was no tenderness or effusions noted. Muscle  strength and tone were normal. EXTREMITIES: No cyanosis, clubbing. +ve for Lymphedema in left upper extremitie  NEUROLOGIC: Alert and oriented x 3. Normal affect. Gait was normal. Normal deep tendon reflexes with no pathological reflexes. Sensation to touch was normal.  PSYCHIATRIC: good mood, orientated to place, time and person     Labs and Imagings     Clinical Support on 03/09/2022   Component Date Value Ref Range Status    WBC 03/09/2022 8.0  4.6 - 10.2 10*3/uL Final    RBC 03/09/2022 4.73  4.2 - 5.4 10*6/uL Final    Hemoglobin 03/09/2022 13.3  12.0 - 16.0 g/dL Final    Hematocrit 03/09/2022 42.2  37.0 - 47.0 % Final    MCV 03/09/2022 89.2  80 - 97 fL Final    MCH 03/09/2022 28.1  27.0 - 31.2 pg Final    MCHC 03/09/2022 31.5 (A) 31.8 - 35.4 g/dL Final    RDW RBC Auto-Rto 03/09/2022 13.6  12.5 - 18.0 % Final    Platelets 03/09/2022 213  142 - 424 10*3/uL Final    Neutrophils 03/09/2022 64.6  37 - 80 % Final    Lymphocytes 03/09/2022 24.9 (A) 25 - 40 % Final    Monocytes 03/09/2022 8.0  1 - 15 % Final    Eosinophils 03/09/2022 1.4  1 - 3 % Final    Basophils 03/09/2022 0.5  0 - 3 % Final    nRBC# 03/09/2022 0.0  % Final    Neutrophils Absolute 03/09/2022 5.17  1.8 - 7.7 10*3/uL Final    Sodium 03/09/2022 141  135 - 145 mmol/L Final    Potassium 03/09/2022 4.5  3.6 - 5.2 mmol/L Final    Chloride 03/09/2022 104  100 - 108 mmol/L Final    CO2 03/09/2022 31  21 - 32 mmol/L Final    Anion Gap 03/09/2022 6.0  3.0 - 11.0 mmol/L Final    BUN 03/09/2022 16  7 - 18 mg/dL Final    Creatinine 03/09/2022 1.24 (A) 0.55 - 1.02 mg/dL Final    GFR ESTIMATION 03/09/2022 55 (A) >60 Final               DESCRIPTION       GLOMERULAR FILTRATION RATE             NORMAL                     >60             KIDNEY  DISEASE           15-60             KIDNEY FAILURE             <15    BUN/Creatinine Ratio 03/09/2022 12.90  7 - 18 Ratio Final    Glucose 03/09/2022 93  70 - 110 mg/dL Final    Calcium 03/09/2022 9.2   8.8 - 10.5 mg/dL Final    Total Bilirubin 2022 0.1  0.0 - 1.0 mg/dL Final    AST 2022 20  15 - 37 U/L Final    ALT 2022 17  12 - 78 U/L Final    Total Protein 2022 6.4  6.4 - 8.2 g/dL Final    Albumin 2022 3.2 (A) 3.4 - 5.0 g/dL Final    Globulin 2022 3.2  2.3 - 3.5 g/dL Final    Albumin/Globulin Ratio 2022 1.000 (A) 1.1 - 1.8 Ratio Final    Alkaline Phosphatase 2022 123 (A) 46 - 116 U/L Final   Orders Only on 2021   Component Date Value Ref Range Status    CANCER ANTIGEN 27.29 2021 18.6  <=39.0 U/mL Final    Comment: INTERPRETIVE INFORMATION: Cancer Antigen 27.29The CA 27.29 assay is intended for use in monitorin) diseaseprogression and/or response to therapy in patients withmetastatic disease, and 2) disease recurrence in patientstreated previously for stages II   or III breast carcinoma who areclinically free of the disease. Serial testing in patients whoare clinically free of disease should be used in conjunctionwith other clinical methods for early detection of cancerrecurrence.Limitations: Patients with   confirmed breast carcinoma frequentlyhave CA 27.29 assay values in the same range as healthyindividuals.  Elevations may also be observed in patients withnon malignant disease. Results of this test must always beinterpreted in the context of morphologic   and other relevantdata, and should not be used alone for a diagnosis of malignancy.Methodology: Siemens Atellica IM BR 27.29 (BR)  chemiluminescentimmunoassay was used. Results obtained with different assaymethods or kits cannot be used                              interchangeably.Performed By: [facility] [place] [id number] Director: Dipkia Sylvester MD     Orders Only on 2021   Component Date Value Ref Range Status    Cancer Antigen 15-3 2021 14  0 - 31 U/mL Final    Comment: INTERPRETIVE INFORMATION: Cancer Antigen-Breast ()The  Roche CA 15-3 electrochemiluminescent immunoassay was used.Results obtained with different methods or kits cannot be usedinterchangeably. The CA 15-3 test is used to aid in themanagement of   Stage II and III breast cancer patients. Serialtesting for patient CA 15-3 values should be used in conjunctionwith other clinical methods for monitoring breast cancer.Patients with confirmed breast carcinoma frequently have CA 15-3values in the same   range as healthy individuals. Elevations maybe observed in patients with nonmalignant disease. Therefore, Bari 15-3 value, regardless of level, should not be interpreted asabsolute evidence of the presence or absence of malignantdisease.Performed By: [facility] [place] [id number] Director: Dipika Sylvester MD     Orders Only on 09/27/2021   Component Date Value Ref Range Status    WBC 09/27/2021 8.2  4.6 - 10.2 10*3/uL Final    RBC 09/27/2021 4.44  4.2 - 5.4 10*6/uL Final    Hemoglobin 09/27/2021 12.4  12.0 - 16.0 g/dL Final    Hematocrit 09/27/2021 39.5  37.0 - 47.0 % Final    MCV 09/27/2021 89.0  80 - 97 fL Final    MCH 09/27/2021 27.9  27.0 - 31.2 pg Final    MCHC 09/27/2021 31.4 (A) 31.8 - 35.4 g/dL Final    RDW RBC Auto-Rto 09/27/2021 14.6  12.5 - 18.0 % Final    Platelets 09/27/2021 179  142 - 424 10*3/uL Final    Neutrophils 09/27/2021 67.5  37 - 80 % Final    Lymphocytes 09/27/2021 19.1 (A) 25 - 40 % Final    Monocytes 09/27/2021 12.3  1 - 15 % Final    Eosinophils 09/27/2021 0.5 (A) 1 - 3 % Final    Basophils 09/27/2021 0.1  0 - 3 % Final    nRBC# 09/27/2021 0.0  % Final    Neutrophils Absolute 09/27/2021 5.56  1.8 - 7.7 10*3/uL Final   Orders Only on 09/27/2021   Component Date Value Ref Range Status    Sodium 09/27/2021 141  135 - 145 mmol/L Final    Potassium 09/27/2021 4.5  3.6 - 5.2 mmol/L Final    Chloride 09/27/2021 104  100 - 108 mmol/L Final    CO2 09/27/2021 32  21 - 32 mmol/L Final    Anion Gap  09/27/2021 5.0  3.0 - 11.0 mmol/L Final    BUN 09/27/2021 11  7 - 18 mg/dL Final    Creatinine 09/27/2021 1.32 (A) 0.55 - 1.02 mg/dL Final    GFR ESTIMATION 09/27/2021 51 (A) >60 Final               DESCRIPTION       GLOMERULAR FILTRATION RATE             NORMAL                     >60             KIDNEY  DISEASE           15-60             KIDNEY FAILURE             <15    BUN/Creatinine Ratio 09/27/2021 8.33  7 - 18 Ratio Final    Glucose 09/27/2021 88  70 - 110 mg/dL Final    Calcium 09/27/2021 8.8  8.8 - 10.5 mg/dL Final    Total Bilirubin 09/27/2021 0.4  0.0 - 1.0 mg/dL Final    AST 09/27/2021 19  15 - 37 U/L Final    ALT 09/27/2021 17  12 - 78 U/L Final    Total Protein 09/27/2021 6.6  6.4 - 8.2 g/dL Final    Albumin 09/27/2021 3.2 (A) 3.4 - 5.0 g/dL Final    Globulin 09/27/2021 3.4  2.3 - 3.5 g/dL Final    Albumin/Globulin Ratio 09/27/2021 0.941 (A) 1.1 - 1.8 Ratio Final    Alkaline Phosphatase 09/27/2021 99  46 - 116 U/L Final         Assessment:     Principle Problem: No primary diagnosis found.   Co-morbidity/Active Problems:   Patient Active Problem List   Diagnosis    Rheumatoid arthritis involving multiple sites    Sjogren's syndrome    Inflammation of sacroiliac joint    Ankylosing spondylitis    Chronic renal impairment    Cerebrovascular accident-2011    Essential hypertension    Hyperlipidemia    Osteoarthritis of knee    Carpal tunnel syndrome    Anxiety    Gastroesophageal reflux disease without esophagitis    Allergic rhinitis    Lumbar radiculopathy, chronic    Infiltrating ductal carcinoma of left breast, stage 2    Lung nodule    Stage 3a chronic kidney disease    Severe obesity (BMI 35.0-39.9) with comorbidity        Cindi Magaña is a 51 y.o. female with PMH of There were no encounter diagnoses., with No primary diagnosis found.     ==============================================  Principal DX:  left segmental mastectomy and axillary node dissection.    Pathological findings shows large area of invasive ductal carcinoma, histological grade 2.  1 positive axillary node for metastasis.   pathological stage IB, histological grade 2, ER +, VT +, HER-2/indiana -, and Ki-67 low,left  breast cancer.   OncoType 16  S/p Radiation Oncology     Stage     Metastatic Sites node, ? Lung nodule . Patient reports this was biopsied at [facility] and was found to be non-malignant  Co-morbidities  RA      Plan:   Overall Plan:   Goal of Treatment:  Source of Guideline: NCCN version , Uptodate      1. Left Breast invasive ductal Carcinoma:     08/16/2022: Patient with Stage II breast cancer currently on Arimidex with questionable lung nodule at last visit. She had repeat scan on 6/2/2022 which showed 8mm pulmonary nodule of the lingula with slight interval decrease in  size. There is questionable calcification within the central aspect of this  Nodule.  She had left breast mastectomy, but is due for left breast yearly mammo in September 2022. Patient reports she still has occasional lymphedema to left axilla and arm. She does have a lymphedema sleeve that helps. She wears at night.  Dexa completed 03/02/2022 reviewed with patient which showed normal bone density. She was previously taking otc Calcium+D, however, was changed to rx vitamin D per pcp and is not taking calcium.  I encouraged to continue otc Calcium, may get without added vitamin D if she is already taking rx.     2. Lung Nodule:  ==Improved per March 2022 ct scan  ==Will continue to monitor and repeat ct scan prior to next visit    3. Long term use of Aromatase Inhibitor  ==Tolerated well  ==Dexa completed March 2022, normal bone density    Mammo right breast September 2022  RTC 6 months  Cbc cmp  Calcium otc - resume  Continue Arimidex  Ct chest in 6 months - prior to visit      Total time spent in counseling and discussion was more than 60 minutes. I discussed in detail further management options at this time and  discussion on relevant tests, imaging and ancillary tests as part of the management plan.

## 2022-08-18 ENCOUNTER — TELEPHONE (OUTPATIENT)
Dept: HEMATOLOGY/ONCOLOGY | Facility: CLINIC | Age: 52
End: 2022-08-18
Payer: MEDICARE

## 2022-08-18 ENCOUNTER — PATIENT OUTREACH (OUTPATIENT)
Dept: ADMINISTRATIVE | Facility: HOSPITAL | Age: 52
End: 2022-08-18
Payer: MEDICARE

## 2022-09-01 LAB — BCS RECOMMENDATION EXT: NORMAL

## 2022-09-20 DIAGNOSIS — C50.912 INFILTRATING DUCTAL CARCINOMA OF LEFT BREAST, STAGE 2: ICD-10-CM

## 2022-09-20 RX ORDER — ANASTROZOLE 1 MG/1
1 TABLET ORAL DAILY
Qty: 90 TABLET | Refills: 4 | Status: SHIPPED | OUTPATIENT
Start: 2022-09-20 | End: 2023-01-18 | Stop reason: SDUPTHER

## 2022-09-27 ENCOUNTER — OFFICE VISIT (OUTPATIENT)
Dept: OBSTETRICS AND GYNECOLOGY | Facility: CLINIC | Age: 52
End: 2022-09-27
Payer: MEDICARE

## 2022-09-27 VITALS
BODY MASS INDEX: 37.32 KG/M2 | SYSTOLIC BLOOD PRESSURE: 152 MMHG | HEART RATE: 80 BPM | DIASTOLIC BLOOD PRESSURE: 95 MMHG | WEIGHT: 224 LBS | HEIGHT: 65 IN

## 2022-09-27 DIAGNOSIS — Z01.419 WELL WOMAN EXAM WITH ROUTINE GYNECOLOGICAL EXAM: Primary | ICD-10-CM

## 2022-09-27 DIAGNOSIS — Z12.4 SCREENING FOR MALIGNANT NEOPLASM OF THE CERVIX: ICD-10-CM

## 2022-09-27 DIAGNOSIS — Z90.12 H/O LEFT MASTECTOMY: ICD-10-CM

## 2022-09-27 DIAGNOSIS — Z85.3 HISTORY OF BREAST CANCER: ICD-10-CM

## 2022-09-27 DIAGNOSIS — E78.2 MIXED HYPERLIPIDEMIA: ICD-10-CM

## 2022-09-27 PROCEDURE — 4010F PR ACE/ARB THEARPY RXD/TAKEN: ICD-10-PCS | Mod: CPTII,S$GLB,, | Performed by: NURSE PRACTITIONER

## 2022-09-27 PROCEDURE — 3008F PR BODY MASS INDEX (BMI) DOCUMENTED: ICD-10-PCS | Mod: CPTII,S$GLB,, | Performed by: NURSE PRACTITIONER

## 2022-09-27 PROCEDURE — 1160F RVW MEDS BY RX/DR IN RCRD: CPT | Mod: CPTII,S$GLB,, | Performed by: NURSE PRACTITIONER

## 2022-09-27 PROCEDURE — 4010F ACE/ARB THERAPY RXD/TAKEN: CPT | Mod: CPTII,S$GLB,, | Performed by: NURSE PRACTITIONER

## 2022-09-27 PROCEDURE — 3080F DIAST BP >= 90 MM HG: CPT | Mod: CPTII,S$GLB,, | Performed by: NURSE PRACTITIONER

## 2022-09-27 PROCEDURE — 3008F BODY MASS INDEX DOCD: CPT | Mod: CPTII,S$GLB,, | Performed by: NURSE PRACTITIONER

## 2022-09-27 PROCEDURE — 3077F PR MOST RECENT SYSTOLIC BLOOD PRESSURE >= 140 MM HG: ICD-10-PCS | Mod: CPTII,S$GLB,, | Performed by: NURSE PRACTITIONER

## 2022-09-27 PROCEDURE — 3077F SYST BP >= 140 MM HG: CPT | Mod: CPTII,S$GLB,, | Performed by: NURSE PRACTITIONER

## 2022-09-27 PROCEDURE — 3080F PR MOST RECENT DIASTOLIC BLOOD PRESSURE >= 90 MM HG: ICD-10-PCS | Mod: CPTII,S$GLB,, | Performed by: NURSE PRACTITIONER

## 2022-09-27 PROCEDURE — G0101 CA SCREEN;PELVIC/BREAST EXAM: HCPCS | Mod: S$GLB,,, | Performed by: NURSE PRACTITIONER

## 2022-09-27 PROCEDURE — 1159F MED LIST DOCD IN RCRD: CPT | Mod: CPTII,S$GLB,, | Performed by: NURSE PRACTITIONER

## 2022-09-27 PROCEDURE — 1160F PR REVIEW ALL MEDS BY PRESCRIBER/CLIN PHARMACIST DOCUMENTED: ICD-10-PCS | Mod: CPTII,S$GLB,, | Performed by: NURSE PRACTITIONER

## 2022-09-27 PROCEDURE — 1159F PR MEDICATION LIST DOCUMENTED IN MEDICAL RECORD: ICD-10-PCS | Mod: CPTII,S$GLB,, | Performed by: NURSE PRACTITIONER

## 2022-09-27 PROCEDURE — G0101 PR CA SCREEN;PELVIC/BREAST EXAM: ICD-10-PCS | Mod: S$GLB,,, | Performed by: NURSE PRACTITIONER

## 2022-09-27 RX ORDER — PRAVASTATIN SODIUM 80 MG/1
80 TABLET ORAL NIGHTLY
Qty: 90 TABLET | Refills: 3 | Status: SHIPPED | OUTPATIENT
Start: 2022-09-27 | End: 2023-08-08 | Stop reason: SDUPTHER

## 2022-09-27 NOTE — PROGRESS NOTES
"    Subjective:       Patient ID: Cindi Magaña is a 52 y.o. female.    Chief Complaint:  Well Woman      History of Present Illness   Presents for annual gyn exam.   Presents for annual gyn exam.  She has a h/o invasive ductal carcinoma of left breast s/p mastectomy + radiation in . Denies any complaints today. Last pap smear done in 2019 negative with negative HPV. No history of abnl pap smears in the past.  Denies any complaints today.States that she gets her routine labs done with Dr Aleman PCP.Dr Pham is managing her RA, Dr Vega is her oncologist, and Dr lopez is her gastroenterologist.  Last MMG negative   2020 Lung Bx MDASPECIMEN SOURCEA. Lung, left upper lobe, FNA  No malignant cells identified  Rare bronchial epithelial cells present  No LMP recorded. Patient is postmenopausal.    OB History          2    Para   2    Term   1       1    AB        Living   1         SAB        IAB        Ectopic        Multiple        Live Births   1                  Review of Systems  Review of Systems   Constitutional: Negative.    Respiratory: Negative.     Cardiovascular: Negative.    Gastrointestinal: Negative.    Genitourinary: Negative.    Musculoskeletal: Negative.    Hematological: Negative.    Psychiatric/Behavioral: Negative.           Objective:     Vitals:    22 0901   BP: (!) 152/95   Pulse: 80   Weight: 101.6 kg (224 lb)   Height: 5' 5" (1.651 m)        Physical Exam:   Constitutional: She is oriented to person, place, and time. She appears well-developed and well-nourished.    HENT:   Head: Normocephalic and atraumatic.     Neck: No thyromegaly present.    Cardiovascular:  Normal rate, regular rhythm and normal heart sounds.             Pulmonary/Chest: Effort normal and breath sounds normal.        Abdominal: Soft. There is no abdominal tenderness.     Genitourinary:    Inguinal canal, uterus, right adnexa, left adnexa and rectum normal.      Pelvic exam was " performed with patient supine.   The external female genitalia was normal.   Genitalia hair distrobution normal .   Labial bartholins normal.Cervix is normal. Vaginal atrophy noted. Cervix exhibits friability.    pap smear completedUterus consistancy normal. and Uerus contour normal  Uterus is not tender. Normal urethral meatus.          Musculoskeletal: Moves all extremeties.       Neurological: She is alert and oriented to person, place, and time.    Skin: Skin is warm and dry.    Psychiatric: She has a normal mood and affect. Her behavior is normal. Judgment and thought content normal.       breast exam wnl- no nipple dc, skin changes, masses or lymph nodes palpated right breast  Left breast absent-mastectomy scar     Assessment:     1. Well woman exam with routine gynecological exam    2. Mixed hyperlipidemia    3. Screening for malignant neoplasm of the cervix    4. H/O left mastectomy    5. History of breast cancer              Plan:       Well woman exam with routine gynecological exam    Mixed hyperlipidemia  -     pravastatin (PRAVACHOL) 80 MG tablet; Take 1 tablet (80 mg total) by mouth every evening.  Dispense: 90 tablet; Refill: 3    Screening for malignant neoplasm of the cervix  -     Liquid-based pap smear, screening    H/O left mastectomy    History of breast cancer       Colonoscopy UTD  Risk assessment for inherited gyn cancer done   Healthy diet, lifestyle, and exercise   OTC Women's multi vitamin   Follow up in about 1 year (around 9/27/2023).

## 2022-09-28 ENCOUNTER — TELEPHONE (OUTPATIENT)
Dept: OBSTETRICS AND GYNECOLOGY | Facility: CLINIC | Age: 52
End: 2022-09-28
Payer: MEDICARE

## 2022-09-28 NOTE — TELEPHONE ENCOUNTER
----- Message from Aurea Kaiser sent at 9/28/2022  3:27 PM CDT -----  [facility] [phone] needs the appt and progress notes from the last appt on 9/27/22 please fax to [phone]  Called the pharmacy and talked to Isabel, and faxed over progress notes.

## 2022-09-29 LAB — Lab: NORMAL

## 2022-10-20 ENCOUNTER — PATIENT OUTREACH (OUTPATIENT)
Dept: ADMINISTRATIVE | Facility: HOSPITAL | Age: 52
End: 2022-10-20
Payer: MEDICARE

## 2022-11-02 DIAGNOSIS — F41.9 ANXIETY: ICD-10-CM

## 2022-11-03 RX ORDER — VENLAFAXINE HYDROCHLORIDE 75 MG/1
CAPSULE, EXTENDED RELEASE ORAL
Qty: 90 CAPSULE | Refills: 3 | OUTPATIENT
Start: 2022-11-03

## 2022-12-05 DIAGNOSIS — K21.9 GASTROESOPHAGEAL REFLUX DISEASE WITHOUT ESOPHAGITIS: ICD-10-CM

## 2022-12-05 RX ORDER — PANTOPRAZOLE SODIUM 40 MG/1
TABLET, DELAYED RELEASE ORAL
Qty: 90 TABLET | Refills: 3 | Status: SHIPPED | OUTPATIENT
Start: 2022-12-05 | End: 2022-12-08 | Stop reason: SDUPTHER

## 2022-12-08 ENCOUNTER — TELEPHONE (OUTPATIENT)
Dept: FAMILY MEDICINE | Facility: CLINIC | Age: 52
End: 2022-12-08
Payer: MEDICARE

## 2022-12-08 DIAGNOSIS — K21.9 GASTROESOPHAGEAL REFLUX DISEASE WITHOUT ESOPHAGITIS: ICD-10-CM

## 2022-12-08 RX ORDER — PANTOPRAZOLE SODIUM 40 MG/1
TABLET, DELAYED RELEASE ORAL
Qty: 90 TABLET | Refills: 3 | Status: SHIPPED | OUTPATIENT
Start: 2022-12-08 | End: 2022-12-09 | Stop reason: SDUPTHER

## 2022-12-08 RX ORDER — PANTOPRAZOLE SODIUM 40 MG/1
TABLET, DELAYED RELEASE ORAL
Qty: 90 TABLET | Refills: 3 | Status: CANCELLED | OUTPATIENT
Start: 2022-12-08

## 2022-12-09 ENCOUNTER — TELEPHONE (OUTPATIENT)
Dept: FAMILY MEDICINE | Facility: CLINIC | Age: 52
End: 2022-12-09
Payer: MEDICARE

## 2022-12-09 DIAGNOSIS — K21.9 GASTROESOPHAGEAL REFLUX DISEASE WITHOUT ESOPHAGITIS: ICD-10-CM

## 2022-12-09 RX ORDER — PANTOPRAZOLE SODIUM 40 MG/1
TABLET, DELAYED RELEASE ORAL
Qty: 90 TABLET | Refills: 3 | Status: SHIPPED | OUTPATIENT
Start: 2022-12-09 | End: 2024-02-22 | Stop reason: SDUPTHER

## 2023-02-08 ENCOUNTER — OFFICE VISIT (OUTPATIENT)
Dept: FAMILY MEDICINE | Facility: CLINIC | Age: 53
End: 2023-02-08
Payer: MEDICARE

## 2023-02-08 VITALS
SYSTOLIC BLOOD PRESSURE: 98 MMHG | HEIGHT: 65 IN | BODY MASS INDEX: 36.4 KG/M2 | TEMPERATURE: 97 F | WEIGHT: 218.5 LBS | OXYGEN SATURATION: 96 % | HEART RATE: 84 BPM | DIASTOLIC BLOOD PRESSURE: 70 MMHG

## 2023-02-08 DIAGNOSIS — M35.00 SJOGREN'S SYNDROME, WITH UNSPECIFIED ORGAN INVOLVEMENT: ICD-10-CM

## 2023-02-08 DIAGNOSIS — N18.31 STAGE 3A CHRONIC KIDNEY DISEASE: ICD-10-CM

## 2023-02-08 DIAGNOSIS — Z79.899 ENCOUNTER FOR LONG-TERM (CURRENT) USE OF OTHER MEDICATIONS: ICD-10-CM

## 2023-02-08 DIAGNOSIS — E66.01 SEVERE OBESITY (BMI 35.0-39.9) WITH COMORBIDITY: ICD-10-CM

## 2023-02-08 DIAGNOSIS — K21.9 GASTROESOPHAGEAL REFLUX DISEASE WITHOUT ESOPHAGITIS: ICD-10-CM

## 2023-02-08 DIAGNOSIS — I63.9 CEREBROVASCULAR ACCIDENT (CVA), UNSPECIFIED MECHANISM: Primary | ICD-10-CM

## 2023-02-08 DIAGNOSIS — I10 ESSENTIAL HYPERTENSION: ICD-10-CM

## 2023-02-08 DIAGNOSIS — R79.9 ABNORMAL FINDING OF BLOOD CHEMISTRY, UNSPECIFIED: ICD-10-CM

## 2023-02-08 DIAGNOSIS — F41.9 ANXIETY: ICD-10-CM

## 2023-02-08 DIAGNOSIS — E78.2 MIXED HYPERLIPIDEMIA: ICD-10-CM

## 2023-02-08 DIAGNOSIS — J06.9 UPPER RESPIRATORY TRACT INFECTION, UNSPECIFIED TYPE: ICD-10-CM

## 2023-02-08 DIAGNOSIS — M06.9 RHEUMATOID ARTHRITIS INVOLVING MULTIPLE SITES, UNSPECIFIED WHETHER RHEUMATOID FACTOR PRESENT: ICD-10-CM

## 2023-02-08 PROBLEM — N18.9 CHRONIC RENAL IMPAIRMENT: Status: RESOLVED | Noted: 2019-04-01 | Resolved: 2023-02-08

## 2023-02-08 PROCEDURE — 3008F BODY MASS INDEX DOCD: CPT | Mod: CPTII,S$GLB,, | Performed by: INTERNAL MEDICINE

## 2023-02-08 PROCEDURE — 1160F RVW MEDS BY RX/DR IN RCRD: CPT | Mod: CPTII,S$GLB,, | Performed by: INTERNAL MEDICINE

## 2023-02-08 PROCEDURE — 99214 OFFICE O/P EST MOD 30 MIN: CPT | Mod: S$GLB,,, | Performed by: INTERNAL MEDICINE

## 2023-02-08 PROCEDURE — 3008F PR BODY MASS INDEX (BMI) DOCUMENTED: ICD-10-PCS | Mod: CPTII,S$GLB,, | Performed by: INTERNAL MEDICINE

## 2023-02-08 PROCEDURE — 3078F PR MOST RECENT DIASTOLIC BLOOD PRESSURE < 80 MM HG: ICD-10-PCS | Mod: CPTII,S$GLB,, | Performed by: INTERNAL MEDICINE

## 2023-02-08 PROCEDURE — 3074F SYST BP LT 130 MM HG: CPT | Mod: CPTII,S$GLB,, | Performed by: INTERNAL MEDICINE

## 2023-02-08 PROCEDURE — 99214 PR OFFICE/OUTPT VISIT, EST, LEVL IV, 30-39 MIN: ICD-10-PCS | Mod: S$GLB,,, | Performed by: INTERNAL MEDICINE

## 2023-02-08 PROCEDURE — 1159F PR MEDICATION LIST DOCUMENTED IN MEDICAL RECORD: ICD-10-PCS | Mod: CPTII,S$GLB,, | Performed by: INTERNAL MEDICINE

## 2023-02-08 PROCEDURE — 3074F PR MOST RECENT SYSTOLIC BLOOD PRESSURE < 130 MM HG: ICD-10-PCS | Mod: CPTII,S$GLB,, | Performed by: INTERNAL MEDICINE

## 2023-02-08 PROCEDURE — 1159F MED LIST DOCD IN RCRD: CPT | Mod: CPTII,S$GLB,, | Performed by: INTERNAL MEDICINE

## 2023-02-08 PROCEDURE — 1160F PR REVIEW ALL MEDS BY PRESCRIBER/CLIN PHARMACIST DOCUMENTED: ICD-10-PCS | Mod: CPTII,S$GLB,, | Performed by: INTERNAL MEDICINE

## 2023-02-08 PROCEDURE — 3078F DIAST BP <80 MM HG: CPT | Mod: CPTII,S$GLB,, | Performed by: INTERNAL MEDICINE

## 2023-02-08 RX ORDER — AZITHROMYCIN 250 MG/1
TABLET, FILM COATED ORAL
Qty: 5 TABLET | Refills: 0 | Status: SHIPPED | OUTPATIENT
Start: 2023-02-08 | End: 2023-08-08

## 2023-02-08 RX ORDER — CARVEDILOL 25 MG/1
25 TABLET ORAL 2 TIMES DAILY WITH MEALS
Qty: 180 TABLET | Refills: 3 | Status: SHIPPED | OUTPATIENT
Start: 2023-02-08 | End: 2023-08-08 | Stop reason: SDUPTHER

## 2023-02-08 RX ORDER — VENLAFAXINE HYDROCHLORIDE 75 MG/1
CAPSULE, EXTENDED RELEASE ORAL
Qty: 90 CAPSULE | Refills: 3 | Status: SHIPPED | OUTPATIENT
Start: 2023-02-08 | End: 2024-01-23 | Stop reason: SDUPTHER

## 2023-02-08 NOTE — PROGRESS NOTES
"Subjective:       Patient ID: Cindi Magaña is a 52 y.o. female.    Chief Complaint: Follow-up      HPI: Cindi comes in today for follow-up.  She says overall she is feeling well although she does have a little bit of a cold.  No significant cough but she is bringing up a little mucus is off color.  No fevers feels a little short of breath at times.  Her O2 sat today on room air is 96%.  She sees Dr. Pham for her rheumatologic issues in her chronic back pain.  She had a history of a stroke years ago but had a good recovery from that.  Her blood pressure is good she is not having any symptoms.  We talked about reducing medicines, but she is not having any symptoms so were going to continue her on her current dose.  She says she is up-to-date on her colon screening and her mammography.  We are going to repeat blood work today and I would like a copy of that sent to Dr. Pham.       Past Medical History:   Past Medical History:   Diagnosis Date    Breast cancer     Cancer     Heart disease     Hyperlipidemia     Hypertension     Kidney disease     Obesity     Rheumatoid arthritis involving multiple sites 2/27/2019    Stroke     Stroke        Past Surgical Historical:   Past Surgical History:   Procedure Laterality Date    MASTECTOMY          Vitals: BP 98/70 (BP Location: Right arm, Patient Position: Sitting, BP Method: Large (Automatic))   Pulse 84   Temp 97 °F (36.1 °C)   Ht 5' 5" (1.651 m)   Wt 99.1 kg (218 lb 8 oz)   SpO2 96%   BMI 36.36 kg/m²      Medications:   Medication List with Changes/Refills   New Medications    AZITHROMYCIN (ZITHROMAX Z-EMILEE) 250 MG TABLET    Take per direction   Current Medications    ANASTROZOLE (ARIMIDEX) 1 MG TAB    Take 1 tablet (1 mg total) by mouth once daily.    ASPIRIN (ECOTRIN) 81 MG EC TABLET    Aspir-81 81 mg tablet,delayed release   Take 1 tablet every day by oral route.    BENAZEPRIL (LOTENSIN) 10 MG TABLET    TAKE 1 TABLET(10 MG) BY MOUTH EVERY DAY    " FLUTICASONE PROPIONATE (FLONASE) 50 MCG/ACTUATION NASAL SPRAY    SHAKE LQ AND U 1 SPR IEN BID    FOLIC ACID (FOLVITE) 1 MG TABLET    folic acid 1 mg tablet   TK 2 TS PO QD    FUROSEMIDE (LASIX) 20 MG TABLET    furosemide 20 mg tablet    GABAPENTIN (NEURONTIN) 300 MG CAPSULE    Take 600 mg by mouth.    HYDROCODONE-ACETAMINOPHEN (NORCO)  MG PER TABLET    Take 1 tablet by mouth.    MUPIROCIN (BACTROBAN) 2 % OINTMENT    mupirocin 2 % topical ointment    PANTOPRAZOLE (PROTONIX) 40 MG TABLET    TK 1 T PO QD    PRAVASTATIN (PRAVACHOL) 80 MG TABLET    Take 1 tablet (80 mg total) by mouth every evening.    TIZANIDINE (ZANAFLEX) 4 MG TABLET    Take 4 mg by mouth 3 (three) times daily.   Changed and/or Refilled Medications    Modified Medication Previous Medication    CARVEDILOL (COREG) 25 MG TABLET carvediloL (COREG) 25 MG tablet       Take 1 tablet (25 mg total) by mouth 2 (two) times daily with meals.    Take 1 tablet (25 mg total) by mouth 2 (two) times daily with meals.    VENLAFAXINE (EFFEXOR-XR) 75 MG 24 HR CAPSULE venlafaxine (EFFEXOR-XR) 75 MG 24 hr capsule       TK 1 C PO QD    TK 1 C PO QD        Past Social History:   Social History     Socioeconomic History    Marital status: Single   Tobacco Use    Smoking status: Former    Smokeless tobacco: Never   Substance and Sexual Activity    Alcohol use: Not Currently    Drug use: Never    Sexual activity: Not Currently     Partners: Male     Birth control/protection: None       Allergies: Review of patient's allergies indicates:  No Known Allergies     Family History:   Family History   Problem Relation Age of Onset    Hypertension Mother     Hypertension Father     Hypertension Sister     Lupus Sister     Hypertension Brother         Review of Systems:  Review of Systems   Respiratory:  Negative for shortness of breath and wheezing.    Cardiovascular:  Negative for chest pain and palpitations.   Gastrointestinal:  Negative for abdominal pain, change in bowel habit  and change in bowel habit.   Musculoskeletal:  Negative for gait problem.   Neurological:  Negative for dizziness and syncope.   Psychiatric/Behavioral:  Negative for suicidal ideas.       Physical Exam:  Physical Exam  Constitutional:       Appearance: Normal appearance. She is obese.   Cardiovascular:      Rate and Rhythm: Normal rate and regular rhythm.   Pulmonary:      Effort: Pulmonary effort is normal. No respiratory distress.      Breath sounds: Normal breath sounds. No stridor. No wheezing or rales.   Abdominal:      General: Abdomen is flat.      Palpations: Abdomen is soft.   Skin:     General: Skin is warm and dry.   Neurological:      General: No focal deficit present.      Mental Status: She is alert and oriented to person, place, and time.   Psychiatric:         Mood and Affect: Mood normal.        Labs:  Patient Outreach on 10/20/2022   Component Date Value Ref Range Status    BCS Recommendation External 09/01/2022 Repeat mammogram in 1 year   Final   Office Visit on 09/27/2022   Component Date Value Ref Range Status    Disclaimer 09/27/2022    Final                    Value:DISCLAIMER: Annual Pap smears are the best means now available for the early detection of cervical cancer.  There is no guarantee, however, that a particular Pap smear will  diseased cells that are present.  Thus, false negative reports may   occasionally occur.          Assessment/Plan:       Problem List Items Addressed This Visit          Neuro    Cerebrovascular accident-2011 - Primary       Psychiatric    Anxiety    Relevant Medications    venlafaxine (EFFEXOR-XR) 75 MG 24 hr capsule       Cardiac/Vascular    Essential hypertension    Relevant Medications    carvediloL (COREG) 25 MG tablet    Other Relevant Orders    Urinalysis, Reflex to Urine Culture Urine, Clean Catch    Hyperlipidemia    Relevant Orders    Lipid Panel       Renal/    Stage 3a chronic kidney disease       Immunology/Multi System    Rheumatoid  arthritis involving multiple sites    Relevant Orders    Urinalysis, Reflex to Urine Culture Urine, Clean Catch    Sjogren's syndrome    Relevant Orders    Uric Acid    Sedimentation rate    RIDDHI by IFA, w/Rflx       Endocrine    Severe obesity (BMI 35.0-39.9) with comorbidity    Relevant Orders    Hemoglobin A1C    Hepatic Function Panel       GI    Gastroesophageal reflux disease without esophagitis     Other Visit Diagnoses       Encounter for long-term (current) use of other medications        Relevant Orders    Basic Metabolic Panel    CBC Auto Differential    TSH    Vitamin D    Abnormal finding of blood chemistry, unspecified        Relevant Orders    Hemoglobin A1C    Upper respiratory tract infection, unspecified type        Relevant Medications    azithromycin (ZITHROMAX Z-EMILEE) 250 MG tablet                   Follow up in about 6 months (around 8/8/2023).     Kevin Aleman

## 2023-02-10 ENCOUNTER — TELEPHONE (OUTPATIENT)
Dept: HEMATOLOGY/ONCOLOGY | Facility: CLINIC | Age: 53
End: 2023-02-10
Payer: MEDICARE

## 2023-02-10 DIAGNOSIS — C50.912 INFILTRATING DUCTAL CARCINOMA OF LEFT BREAST, STAGE 2: Primary | ICD-10-CM

## 2023-02-10 DIAGNOSIS — M10.9 GOUT, UNSPECIFIED CAUSE, UNSPECIFIED CHRONICITY, UNSPECIFIED SITE: Primary | ICD-10-CM

## 2023-02-10 LAB
ABS NRBC COUNT: 0 X 10 3/UL (ref 0–0.01)
ABSOLUTE BASOPHIL: 0.02 X 10 3/UL (ref 0–0.22)
ABSOLUTE EOSINOPHIL: 0.11 X 10 3/UL (ref 0.04–0.54)
ABSOLUTE IMMATURE GRAN: 0.04 X 10 3/UL (ref 0–0.04)
ABSOLUTE LYMPHOCYTE: 1.54 X 10 3/UL (ref 0.86–4.75)
ABSOLUTE MONOCYTE: 0.78 X 10 3/UL (ref 0.22–1.08)
ALBUMIN SERPL-MCNC: 3.8 G/DL (ref 3.5–5.2)
ALP ISOS SERPL LEV INH-CCNC: 124 U/L (ref 35–105)
ALT (SGPT): 8 U/L (ref 0–33)
AMORPH URATE CRY URNS QL MICRO: NEGATIVE
ANA SER-ACNC: NEGATIVE
ANION GAP SERPL CALC-SCNC: 9 MMOL/L (ref 8–17)
AST SERPL-CCNC: 13 U/L (ref 0–32)
BACTERIA #/AREA URNS HPF: ABNORMAL /[HPF]
BASOPHILS NFR BLD: 0.2 % (ref 0.2–1.2)
BILIRUB CONJ+UNCONJ SERPL-MCNC: ABNORMAL MG/DL (ref 0.1–0.8)
BILIRUB UR QL STRIP: NEGATIVE
BILIRUBIN DIRECT+TOT PNL SERPL-MCNC: <0.2 MG/DL (ref 0–0.3)
BILIRUBIN, TOTAL: 0.28 MG/DL (ref 0–1.2)
BUN/CREAT SERPL: 13.6 (ref 6–20)
CALCIUM SERPL-MCNC: 9.5 MG/DL (ref 8.6–10.2)
CARBON DIOXIDE, CO2: 27 MMOL/L (ref 22–29)
CHLORIDE: 102 MMOL/L (ref 98–107)
CHOLEST SERPL-MSCNC: 217 MG/DL (ref 100–200)
CLARITY UR: ABNORMAL
COLOR UR: YELLOW
CREAT SERPL-MCNC: 1.29 MG/DL (ref 0.5–0.9)
EOSINOPHIL NFR BLD: 1.2 % (ref 0.7–7)
EPITHELIAL CELLS: ABNORMAL
ESTIMATED AVERAGE GLUCOSE: 120 MG/DL
GFR ESTIMATION: 49.32
GLOBULIN: 2.6 G/DL (ref 1.5–4.5)
GLUCOSE (UA): NEGATIVE MG/DL
GLUCOSE: 99 MG/DL (ref 74–106)
GRAN CASTS #/AREA URNS LPF: ABNORMAL /[LPF]
HBA1C MFR BLD: 5.8 % (ref 4–6)
HCT VFR BLD AUTO: 39.3 % (ref 37–47)
HDLC SERPL-MCNC: 56 MG/DL
HGB BLD-MCNC: 12.5 G/DL (ref 12–16)
HYALINE CASTS #/AREA URNS LPF: ABNORMAL /[LPF]
IMMATURE GRANULOCYTES: 0.4 % (ref 0–0.5)
KETONES UR QL STRIP: ABNORMAL MG/DL
LDL/HDL RATIO: 2.1 (ref 1–3)
LDLC SERPL CALC-MCNC: 116.8 MG/DL (ref 0–100)
LEUKOCYTE ESTERASE UR QL STRIP: ABNORMAL
LYMPHOCYTES NFR BLD: 17.1 % (ref 19.3–53.1)
MCH RBC QN AUTO: 27.9 PG (ref 27–32)
MCHC RBC AUTO-ENTMCNC: 31.8 G/DL (ref 32–36)
MCV RBC AUTO: 87.7 FL (ref 82–100)
MONOCYTES NFR BLD: 8.7 % (ref 4.7–12.5)
MUCOUS THREADS URNS QL MICRO: ABNORMAL
NEUTROPHILS # BLD AUTO: 6.52 X 10 3/UL (ref 2.15–7.56)
NEUTROPHILS NFR BLD: 72.4 % (ref 34–71.1)
NITRITE UR QL STRIP: NEGATIVE
NUCLEATED RED BLOOD CELLS: 0 /100 WBC (ref 0–0.2)
OCCULT BLOOD: NEGATIVE
PH, URINE: 6 (ref 5–7.5)
PLATELET # BLD AUTO: 176 X 10 3/UL (ref 135–400)
POTASSIUM: 4.4 MMOL/L (ref 3.5–5.1)
PROT SNV-MCNC: 6.4 G/DL (ref 6.4–8.3)
PROT UR QL STRIP: 500 MG/DL
RBC # BLD AUTO: 4.48 X 10 6/UL (ref 4.2–5.4)
RBC/HPF: NEGATIVE
RDW-SD: 46 FL (ref 37–54)
SED RATE (WESTERGREN): 46 MM/HR (ref 0–30)
SODIUM: 138 MMOL/L (ref 136–145)
SP GR UR STRIP: 1.02 (ref 1–1.03)
TRIGL SERPL-MCNC: 221 MG/DL (ref 0–150)
TSH SERPL DL<=0.005 MIU/L-ACNC: 1.51 UIU/ML (ref 0.27–4.2)
URATE SERPL-MCNC: 9 MG/DL (ref 2.4–5.7)
UREA NITROGEN (BUN): 17.6 MG/DL (ref 6–20)
URINE CULTURE, ROUTINE: NORMAL
UROBILINOGEN, URINE: 1 E.U./DL (ref 0–1)
VITAMIN D (25OHD): 25.3 NG/ML
WBC # BLD: 9.01 X 10 3/UL (ref 4.3–10.8)
WBC/HPF: ABNORMAL

## 2023-02-10 RX ORDER — ALLOPURINOL 300 MG/1
300 TABLET ORAL DAILY
Qty: 90 TABLET | Refills: 3 | Status: SHIPPED | OUTPATIENT
Start: 2023-02-10 | End: 2024-01-23 | Stop reason: SDUPTHER

## 2023-02-23 DIAGNOSIS — C50.912 INFILTRATING DUCTAL CARCINOMA OF LEFT BREAST, STAGE 2: Primary | ICD-10-CM

## 2023-02-27 LAB
ALBUMIN SERPL BCP-MCNC: 3 G/DL (ref 3.4–5)
ALBUMIN/GLOBULIN RATIO: 0.68 RATIO (ref 1.1–1.8)
ALP SERPL-CCNC: 138 U/L (ref 46–116)
ALT SERPL W P-5'-P-CCNC: 11 U/L (ref 12–78)
ANION GAP SERPL CALC-SCNC: 7 MMOL/L (ref 3–11)
AST SERPL-CCNC: 25 U/L (ref 15–37)
BASOPHILS NFR BLD: 0.9 % (ref 0–3)
BILIRUB SERPL-MCNC: 0.3 MG/DL (ref 0–1)
BUN SERPL-MCNC: 24 MG/DL (ref 7–18)
BUN/CREAT SERPL: 16.66 RATIO (ref 7–18)
CALCIUM SERPL-MCNC: 9 MG/DL (ref 8.8–10.5)
CHLORIDE SERPL-SCNC: 104 MMOL/L (ref 100–108)
CO2 SERPL-SCNC: 26 MMOL/L (ref 21–32)
CREAT SERPL-MCNC: 1.44 MG/DL (ref 0.55–1.02)
EOSINOPHIL NFR BLD: 4.2 % (ref 1–3)
ERYTHROCYTE [DISTWIDTH] IN BLOOD BY AUTOMATED COUNT: 14.2 % (ref 12.5–18)
GFR ESTIMATION: 46
GLOBULIN: 4.4 G/DL (ref 2.3–3.5)
GLUCOSE SERPL-MCNC: 117 MG/DL (ref 70–110)
HCT VFR BLD AUTO: 38.6 % (ref 37–47)
HGB BLD-MCNC: 12.2 G/DL (ref 12–16)
LYMPHOCYTES NFR BLD: 21.1 % (ref 25–40)
MCH RBC QN AUTO: 28.1 PG (ref 27–31.2)
MCHC RBC AUTO-ENTMCNC: 31.6 G/DL (ref 31.8–35.4)
MCV RBC AUTO: 88.9 FL (ref 80–97)
MONOCYTES NFR BLD: 10.2 % (ref 1–15)
NEUTROPHILS # BLD AUTO: 3.6 10*3/UL (ref 1.8–7.7)
NEUTROPHILS NFR BLD: 63.2 % (ref 37–80)
NUCLEATED RED BLOOD CELLS: 0 %
PLATELETS: 199 10*3/UL (ref 142–424)
POTASSIUM SERPL-SCNC: 4.6 MMOL/L (ref 3.6–5.2)
PROT SERPL-MCNC: 7.4 G/DL (ref 6.4–8.2)
RBC # BLD AUTO: 4.34 10*6/UL (ref 4.2–5.4)
SODIUM BLD-SCNC: 137 MMOL/L (ref 135–145)
WBC # BLD: 5.7 10*3/UL (ref 4.6–10.2)

## 2023-02-28 ENCOUNTER — OFFICE VISIT (OUTPATIENT)
Dept: HEMATOLOGY/ONCOLOGY | Facility: CLINIC | Age: 53
End: 2023-02-28
Payer: MEDICARE

## 2023-02-28 VITALS
WEIGHT: 221 LBS | TEMPERATURE: 97 F | SYSTOLIC BLOOD PRESSURE: 153 MMHG | DIASTOLIC BLOOD PRESSURE: 84 MMHG | HEART RATE: 64 BPM | OXYGEN SATURATION: 98 % | HEIGHT: 65 IN | RESPIRATION RATE: 16 BRPM | BODY MASS INDEX: 36.82 KG/M2

## 2023-02-28 DIAGNOSIS — Z79.811 AROMATASE INHIBITOR USE: ICD-10-CM

## 2023-02-28 DIAGNOSIS — Z17.0 MALIGNANT NEOPLASM OF UPPER-OUTER QUADRANT OF LEFT BREAST IN FEMALE, ESTROGEN RECEPTOR POSITIVE: ICD-10-CM

## 2023-02-28 DIAGNOSIS — C50.912 INFILTRATING DUCTAL CARCINOMA OF LEFT BREAST, STAGE 2: Primary | ICD-10-CM

## 2023-02-28 DIAGNOSIS — C50.412 MALIGNANT NEOPLASM OF UPPER-OUTER QUADRANT OF LEFT BREAST IN FEMALE, ESTROGEN RECEPTOR POSITIVE: ICD-10-CM

## 2023-02-28 DIAGNOSIS — R91.1 LUNG NODULE: ICD-10-CM

## 2023-02-28 PROCEDURE — 3077F PR MOST RECENT SYSTOLIC BLOOD PRESSURE >= 140 MM HG: ICD-10-PCS | Mod: CPTII,S$GLB,, | Performed by: NURSE PRACTITIONER

## 2023-02-28 PROCEDURE — 3044F PR MOST RECENT HEMOGLOBIN A1C LEVEL <7.0%: ICD-10-PCS | Mod: CPTII,S$GLB,, | Performed by: NURSE PRACTITIONER

## 2023-02-28 PROCEDURE — 3008F PR BODY MASS INDEX (BMI) DOCUMENTED: ICD-10-PCS | Mod: CPTII,S$GLB,, | Performed by: NURSE PRACTITIONER

## 2023-02-28 PROCEDURE — 3044F HG A1C LEVEL LT 7.0%: CPT | Mod: CPTII,S$GLB,, | Performed by: NURSE PRACTITIONER

## 2023-02-28 PROCEDURE — 99214 OFFICE O/P EST MOD 30 MIN: CPT | Mod: S$GLB,,, | Performed by: NURSE PRACTITIONER

## 2023-02-28 PROCEDURE — 99214 PR OFFICE/OUTPT VISIT, EST, LEVL IV, 30-39 MIN: ICD-10-PCS | Mod: S$GLB,,, | Performed by: NURSE PRACTITIONER

## 2023-02-28 PROCEDURE — 1159F MED LIST DOCD IN RCRD: CPT | Mod: CPTII,S$GLB,, | Performed by: NURSE PRACTITIONER

## 2023-02-28 PROCEDURE — 1159F PR MEDICATION LIST DOCUMENTED IN MEDICAL RECORD: ICD-10-PCS | Mod: CPTII,S$GLB,, | Performed by: NURSE PRACTITIONER

## 2023-02-28 PROCEDURE — 3077F SYST BP >= 140 MM HG: CPT | Mod: CPTII,S$GLB,, | Performed by: NURSE PRACTITIONER

## 2023-02-28 PROCEDURE — 3079F DIAST BP 80-89 MM HG: CPT | Mod: CPTII,S$GLB,, | Performed by: NURSE PRACTITIONER

## 2023-02-28 PROCEDURE — 3008F BODY MASS INDEX DOCD: CPT | Mod: CPTII,S$GLB,, | Performed by: NURSE PRACTITIONER

## 2023-02-28 PROCEDURE — 3079F PR MOST RECENT DIASTOLIC BLOOD PRESSURE 80-89 MM HG: ICD-10-PCS | Mod: CPTII,S$GLB,, | Performed by: NURSE PRACTITIONER

## 2023-02-28 NOTE — PROGRESS NOTES
Medical Oncology Progress Note  [facility]     PATIENT: Cindi Magaña  : 1970 52 y.o. female  MRN [id number]     PCP: Primary Doctor No  Consult Requested By:      Date of Service: 2023    Subjective:   Interim History:    No chief complaint on file.    Cindi Magaña is here for f/up  Visit.    The patient has surgery and radiation therapy to the left breast at [facility] in 2020 without complications..  The patient here for follow-up visit and she has chronic pain she is on Arimidex 1 milligram p.o. daily.  She was previously being followed by Dr. Homero Vega then Dr. Diego 2022.    Oncology History:    Left Breast Invasive ductal carcinoma, grade 1, ER 93%, NC 75%, HER-2 negative (score 0), Ki-67 8%, and ductal carcinoma in situ, grade 1, cribriform type with no necrosis and focally associated fine needed microcalcifications, very focal     Infiltrating duct carcinoma of upper inner quadrant of left female breast     ==10/18/2019 Initial Diagnosis       10/18/19 Screening Mmg: -new focal area of architectural distortion located near the 10 o'clock position of the middle left breast that is worrisome for malignancy.  No definite associated mass or suspicious calcification.    Left Breast US:   ==10:00 zone 2 position irregular hypoechoic shadowing mass correlates with mammographic abnormality and measures 1×1.4×1.4 cm.     ==2019 Biopsy   [facility]  Invasive ductal carcinoma, grade 1, ER 93%, NC 75%, HER-2 negative (score 0), Ki-67 8%, and ductal carcinoma in situ, grade 1, cribriform type with no necrosis      [facility] Biopsies  Left Breast 9:00 core bx with Bard coil-shaped clip placement: INVASIVE DUCTAL CARCINOMA WITH FOCAL NEUROENDOCRINE DIFFERENTIATION, INTERMEDIATE NUCLEAR GRADE, JAYDON HISTOLOGIC GRADE 1, ER/NC 95, HER2 negative (0)  Left axillary lymph node FNA with [place] U-shaped clip placement: metastatic  adenocarcinoma     ==1/30/2020 Lung Bx MDASPECIMEN SOURCEA. Lung, left upper lobe, FNA  No malignant cells identified  Rare bronchial epithelial cells present    ==3/2/2020 Staging form: Breast, AJCC 8th Edition  - Pathologic stage from 3/2/2020: Stage IB (pT3, pN1a, cM0, G2, ER+, KY+, HER2-) - Signed by Serafin Schneider MD on 3/3/2020  3/2020:   ==Oncotype DX recurrence score is 16 with distant recurrence risk at 9 years estimated to be 15% with hormonal therapy alone. There is no apparent benefit from chemotherapy. She will proceed with hormonal therapy alone with anastrozole 1 mg PO daily.   ==CT left lung nodule; per patient, this is old finding  ==08/06/2022: CT chest 8mm pulmonary nodule of the lingula with slight interval decrease in   size. There is questionable calcification within the central aspect of this    nodule.     Oncology History    No history exists.       Past Medical History:   Past Medical History:   Diagnosis Date    Breast cancer     Cancer     Heart disease     Hyperlipidemia     Hypertension     Kidney disease     Obesity     Rheumatoid arthritis involving multiple sites 2/27/2019    Stroke     Stroke        Past Surgical HIstory:   Past Surgical History:   Procedure Laterality Date    MASTECTOMY         Allergies:  Review of patient's allergies indicates:  No Known Allergies    Medications:  Current Outpatient Medications   Medication Sig Dispense Refill    allopurinoL (ZYLOPRIM) 300 MG tablet Take 1 tablet (300 mg total) by mouth once daily. 90 tablet 3    anastrozole (ARIMIDEX) 1 mg Tab Take 1 tablet (1 mg total) by mouth once daily. 90 tablet 4    aspirin (ECOTRIN) 81 MG EC tablet Aspir-81 81 mg tablet,delayed release   Take 1 tablet every day by oral route.      azithromycin (ZITHROMAX Z-EMILEE) 250 MG tablet Take per direction 5 tablet 0    benazepriL (LOTENSIN) 10 MG tablet TAKE 1 TABLET(10 MG) BY MOUTH EVERY DAY 90 tablet 3    carvediloL (COREG) 25 MG tablet Take 1 tablet (25 mg total)  by mouth 2 (two) times daily with meals. 180 tablet 3    fluticasone propionate (FLONASE) 50 mcg/actuation nasal spray SHAKE LQ AND U 1 SPR IEN BID 18.2 mL 6    folic acid (FOLVITE) 1 MG tablet folic acid 1 mg tablet   TK 2 TS PO QD      furosemide (LASIX) 20 MG tablet furosemide 20 mg tablet      gabapentin (NEURONTIN) 300 MG capsule Take 600 mg by mouth.      HYDROcodone-acetaminophen (NORCO)  mg per tablet Take 1 tablet by mouth.      mupirocin (BACTROBAN) 2 % ointment mupirocin 2 % topical ointment      pantoprazole (PROTONIX) 40 MG tablet TK 1 T PO QD 90 tablet 3    pravastatin (PRAVACHOL) 80 MG tablet Take 1 tablet (80 mg total) by mouth every evening. 90 tablet 3    tiZANidine (ZANAFLEX) 4 MG tablet Take 4 mg by mouth 3 (three) times daily.      venlafaxine (EFFEXOR-XR) 75 MG 24 hr capsule TK 1 C PO QD 90 capsule 3     No current facility-administered medications for this visit.       Family History:   Family History   Problem Relation Age of Onset    Hypertension Mother     Hypertension Father     Hypertension Sister     Lupus Sister     Hypertension Brother        Social History:  reports that she has quit smoking. She has never used smokeless tobacco. She reports that she does not currently use alcohol. She reports that she does not use drugs.    Review of Systemas    Constitutional: No change in weight, appetie, fatigue, fever, or night sweats  Eyes: No changes in vision  Ears, Nose, Mouth, Throat, and Face: No hearing problems, ear pain, rummy nose, or sore throat  Respiratory: No shortness of breath or cough  Cardiovascular: No chest pain, palpitations or dizziness  Gastrointestinal: No abdominal pain, hematochezia, melena  Genitourinary: No dysuria, hematuria, nocturia, menstrual problems, post menopausal  Integumentary/Breast: No rashes or itching  Hematologic/Lymphatic: No anemia or bleeding abnormalities  Musculoskeletal: No joint or muscle abnormalities  Neurological: No sensory or motor  problems, no headaches  Behavioral/Psych: No depression, anxiety, cognitive problems, or stress  Endocrine: No diabetes or thyroid problem  Allergic/Immunologic: See allergy above    Physical Exam      Vitals:   There were no vitals filed for this visit.  BMI: There is no height or weight on file to calculate BMI.  BSA There is no height or weight on file to calculate BSA.  ECOG Performance Status:   ECOG SCORE           GENERAL APPEARANCE: Well developed, well nourished, in no acute distress.  SKIN: Inspection of the skin reveals no rashes, ulcerations or petechiae.  HEENT: The sclerae were anicteric and conjunctivae were pink and moist. Extraocular movements were intact and pupils were equal, round, and reactive to light with normal accommodation. External inspection of the ears and nose showed no scars, lesions, or masses. Lips, teeth, and gums showed normal mucosa. The oral mucosa, hard and soft palate, tongue and posterior pharynx were normal.  NECK: Supple and symmetric. There was no thyroid enlargement, and no tenderness, or masses were felt.  CRESPIRATORY: Normal AP diameter and normal contour without any kyphoscoliosis. LUNGS: Auscultation of the lungs revealed normal breath sounds without any other adventitious sounds or rubs.  CARDIOVASCULAR: There was a regular rate and rhythm without any murmurs, gallops, rubs. The carotid pulses were normal and 2+ bilaterally without bruits. Peripheral pulses were 2+ and symmetric.  GASTROINTESTINAL: Soft and nontender with normal bowel sounds. The liver span was approximately 5-6 cm in the right midclavicular line by percussion. The liver edge was nontender. The spleen was not palpable. There were no inguinal or umbilical hernias noted. No ascites was noted.  LYMPH NODES: No lymphadenopathy was appreciated in the neck, axillae or groin.   MUSCULOSKELETAL: Gait was normal. There was no tenderness or effusions noted. Muscle strength and tone were normal. EXTREMITIES: No  cyanosis, clubbing. +ve for Lymphedema in left upper extremitie  NEUROLOGIC: Alert and oriented x 3. Normal affect. Gait was normal. Normal deep tendon reflexes with no pathological reflexes. Sensation to touch was normal.  PSYCHIATRIC: good mood, orientated to place, time and person     Labs and Imagings     Orders Only on 02/23/2023   Component Date Value Ref Range Status    Sodium 02/27/2023 137  135 - 145 mmol/L Final    Potassium 02/27/2023 4.6  3.6 - 5.2 mmol/L Final    Chloride 02/27/2023 104  100 - 108 mmol/L Final    CO2 02/27/2023 26  21 - 32 mmol/L Final    Anion Gap 02/27/2023 7.0  3.0 - 11.0 mmol/L Final    BUN 02/27/2023 24 (H)  7 - 18 mg/dL Final    Creatinine 02/27/2023 1.44 (H)  0.55 - 1.02 mg/dL Final    GFR ESTIMATION 02/27/2023 46 (L)  >60 Final               DESCRIPTION       GLOMERULAR FILTRATION RATE             NORMAL                     >60             KIDNEY  DISEASE           15-60             KIDNEY FAILURE             <15    BUN/Creatinine Ratio 02/27/2023 16.66  7 - 18 Ratio Final    Glucose 02/27/2023 117 (H)  70 - 110 mg/dL Final    Calcium 02/27/2023 9.0  8.8 - 10.5 mg/dL Final    Total Bilirubin 02/27/2023 0.3  0.0 - 1.0 mg/dL Final    AST 02/27/2023 25  15 - 37 U/L Final    ALT 02/27/2023 11 (L)  12 - 78 U/L Final    Total Protein 02/27/2023 7.4  6.4 - 8.2 g/dL Final    Albumin 02/27/2023 3.0 (L)  3.4 - 5.0 g/dL Final    Globulin 02/27/2023 4.4 (H)  2.3 - 3.5 g/dL Final    Albumin/Globulin Ratio 02/27/2023 0.681 (L)  1.1 - 1.8 Ratio Final    Alkaline Phosphatase 02/27/2023 138 (H)  46 - 116 U/L Final    WBC 02/27/2023 5.7  4.6 - 10.2 10*3/uL Final    RBC 02/27/2023 4.34  4.2 - 5.4 10*6/uL Final    Hemoglobin 02/27/2023 12.2  12.0 - 16.0 g/dL Final    Hematocrit 02/27/2023 38.6  37.0 - 47.0 % Final    MCV 02/27/2023 88.9  80 - 97 fL Final    MCH 02/27/2023 28.1  27.0 - 31.2 pg Final    MCHC 02/27/2023 31.6 (L)  31.8 - 35.4 g/dL Final    RDW RBC Auto-Rto 02/27/2023 14.2  12.5 - 18.0  % Final    Platelets 02/27/2023 199  142 - 424 10*3/uL Final    Neutrophils 02/27/2023 63.2  37 - 80 % Final    Lymphocytes 02/27/2023 21.1 (L)  25 - 40 % Final    Monocytes 02/27/2023 10.2  1 - 15 % Final    Eosinophils 02/27/2023 4.2 (H)  1 - 3 % Final    Basophils 02/27/2023 0.9  0 - 3 % Final    nRBC# 02/27/2023 0.0  % Final    Neutrophils Absolute 02/27/2023 3.60  1.8 - 7.7 10*3/uL Final   Office Visit on 02/08/2023   Component Date Value Ref Range Status    Glucose 02/08/2023 99  74 - 106 mg/dL Final    BUN 02/08/2023 17.6  6 - 20 mg/dL Final    Creatinine 02/08/2023 1.29 (H)  0.50 - 0.90 mg/dL Final    Recommend repeat creatinine within 90 days.    Calcium 02/08/2023 9.5  8.6 - 10.2 mg/dL Final    Sodium 02/08/2023 138  136 - 145 mmol/L Final    Potassium 02/08/2023 4.4  3.5 - 5.1 mmol/L Final    Chloride 02/08/2023 102  98 - 107 mmol/L Final    CO2 02/08/2023 27  22 - 29 mmol/L Final    BUN/Creatinine Ratio 02/08/2023 13.6  6 - 20 Final    GFR ESTIMATION 02/08/2023 49.32 (L)  >60.00 Final    Anion Gap 02/08/2023 9.0  8.0 - 17.0 mmol/L Final    Comment: NOTE  Testing performed at:  [facility], [address] [place] [address]  [address] CLIA #:[id number]      WBC 02/08/2023 9.01  4.3 - 10.8 X 10 3/ul Final    RBC 02/08/2023 4.48  4.2 - 5.4 X 10 6/ul Final    RDW-SD 02/08/2023 46.0  37 - 54 fl Final    Hemoglobin 02/08/2023 12.5  12 - 16 g/dL Final    Hematocrit 02/08/2023 39.3  37 - 47 % Final    MCV 02/08/2023 87.7  82 - 100 fl Final    MCH 02/08/2023 27.9  27 - 32 pg Final    MCHC 02/08/2023 31.8 (L)  32 - 36 g/dL Final    Platelets 02/08/2023 176  135 - 400 X 10 3/ul Final    Neutrophils 02/08/2023 72.4 (H)  34 - 71.1 % Final    Lymphocytes 02/08/2023 17.1 (L)  19.3 - 53.1 % Final    Monocytes 02/08/2023 8.7  4.7 - 12.5 % Final    Eosinophils 02/08/2023 1.2  0.7 - 7.0 % Final    Basophils 02/08/2023 0.2  0.2 - 1.2 % Final    Neutrophils Absolute 02/08/2023 6.52  2.15 - 7.56 X 10 3/ul Final     Lymphocytes Absolute 02/08/2023 1.54  0.86 - 4.75 X 10 3/ul Final    Monocytes Absolute 02/08/2023 0.78  0.22 - 1.08 X 10 3/ul Final    Eosinophils Absolute 02/08/2023 0.11  0.04 - 0.54 X 10 3/ul Final    Basophils Absolute 02/08/2023 0.02  0.00 - 0.22 X 10 3/ul Final    Immature Granulocytes Absolute 02/08/2023 0.04  0 - 0.04 X 10 3/ul Final    Immature Granulocytes 02/08/2023 0.4  0 - 0.5 % Final    IG includes metamyelocytes, myelocytes, and promyelocytes    nRBC# 02/08/2023 0.0  0 - 0.2 /100 WBC Final    nRBC Count Absolute 02/08/2023 0.000  0 - 0.012 x 10 3/ul Final    Comment: NOTE  Testing performed at:  [facility], [address] [place] [address]  [address] CLIA #:[id number]      Hemoglobin A1C 02/08/2023 5.8  4.0 - 6.0 % Final    EST AVERAGE GLUCOSE 02/08/2023 120 (H)  NORMAL MG/DL Final    Comment: NOTE  Testing performed at:  [facility], [address] [place] [address]  [address] CLIA #:[id number]      AST 02/08/2023 13  0 - 32 U/L Final    ALT (SGPT) 02/08/2023 8  0 - 33 U/L Final    Alkaline Phosphatase 02/08/2023 124 (H)  35 - 105 U/L Final    Protein, Total 02/08/2023 6.4  6.4 - 8.3 g/dL Final    Albumin 02/08/2023 3.8  3.5 - 5.2 g/dL Final    BILIRUBIN, TOTAL 02/08/2023 0.28  0.00 - 1.20 mg/dL Final    Bilirubin, Direct 02/08/2023 <0.20  0.00 - 0.30 mg/dL Final    Globulin 02/08/2023 2.6  1.5 - 4.5 g/dL Final    Bilirubin, Indirect 02/08/2023 Unable to Calculate  0.10 - 0.80 mg/dL Final    Comment: NOTE  Testing performed at:  [facility], [address] [place] [address]  [address] CLIA #:[id number]      TSH 02/08/2023 1.51  0.27 - 4.20 uIU/mL Final    Comment: NOTE  Testing performed at:  [facility], [address] [place] [address]  [address] CLIA #:[id number]      Cholesterol 02/08/2023 217 (H)  100 - 200 mg/dL Final    Comment: Desirable  <200  Borderline 200-240  High risk  >240      Triglycerides 02/08/2023 221 (H)  0 - 150 mg/dL Final    HDL 02/08/2023 56  (L)  >60 mg/dL Final    Comment: <40 mg/dL Major risk factor for CHD  >60 mg/dL Negative risk factor for CHD      LDL Cholesterol 02/08/2023 116.8 (H)  0 - 100 mg/dL Final    LDL/HDL Ratio 02/08/2023 2.1  1 - 3 Final    Comment: NOTE  Testing performed at:  [facility], [address] [place] [address] CLIA #:[id number]      VITAMIN D (25OHD) 02/08/2023 25.3 (L)  >30 ng/mL Final    Comment: Expected values:    Deficient                 <20 ng/mL  Insufficient              21-29 ng/mL  Sufficient                >30 ng/mL  NOTE  Testing performed at:  [facility], [address] [place] [address] CLIA #:[id number]      Uric Acid 02/08/2023 9.0 (H)  2.4 - 5.7 mg/dL Final    Comment: NOTE  Testing performed at:  [facility], [address] [place] [address] CLIA #:[id number]      SED RATE (WESTERGREN) 02/08/2023 46 (H)  0 - 30 mm/hr Final    Comment: NOTE  Testing performed at:  [facility], [address] [place] [address] CLIA #:[id number]      RIDDHI 02/08/2023 NEGATIVE   Final    Comment: RIDDHI testing performed using IFA (indirect fluorescent antibody).  NOTE  Testing performed at:  [facility], [address] [place], [place] [id number] CLIA #:[id number]      Color, UA 02/08/2023 YELLOW   Final    Clarity, UA 02/08/2023 CLOUDY   Final    Specific Gravity,UA 02/08/2023 1.020  1.005 - 1.030 Final    pH, Urine 02/08/2023 6  5 - 7.5 Final    Leukocytes, UA 02/08/2023 SMALL (A)  NEGATIVE Final    Nitrite, Urine 02/08/2023 NEGATIVE  NEGATIVE Final    Protein, UA 02/08/2023 500 (H)  NEGATIVE mg/dL Final    Glucose, UA 02/08/2023 NEGATIVE  NEGATIVE mg/dL Final    Ketones, UA 02/08/2023 TRACE (A)  NEGATIVE mg/dL Final    Urobilinogen, urine 02/08/2023 1.0  0 - 1.0 E.U./dL Final    Bilirubin (UA) 02/08/2023 NEGATIVE  NEGATIVE Final    Occult Blood 02/08/2023 NEGATIVE  NEGATIVE Final    WBC/HPF 02/08/2023 5-10 (A)  <5 Final    RBC/HPF 02/08/2023  NEGATIVE  <5 Final    Amorphous, UA 02/08/2023 NEGATIVE   Final    Bacteria, UA 02/08/2023 2+ (A)  NEG-TRACE Final    Epithelial Cells 02/08/2023 MODERATE (A)  NEGATIVE-FEW Final    Mucus, UA 02/08/2023 1+ (A)  NEGATIVE Final    Hyaline Casts, UA 02/08/2023 0-2 PER LPF   Final    Granular Casts, UA 02/08/2023 0-2 PER LPF   Final    Comment: NOTE  Testing performed at:  [facility], [address] [place] [address]  [address] CLIA #:[id number]      Urine Culture, Routine 02/08/2023    Final    Comment: Source: URINE  Site:  Organism #1                    ESCHERICHIA COLI  Quantity                      50,000    Antibiotic Susceptibility  Organism #                1  Isolate                   E. Coli  Antibiotic                INT   ALEC  Amikacin                  S     <=16  Amoxicillin/K Clavulanate S     <=8/4  Ampicillin                S     <=8  Ampicillin/sulbactam      S     <=4/2  Aztreonam                 S     <=4  Cefazolin                 S     <=2  Cefepime                  S     <=2  Cefotaxime                S     <=2  Cefoxitin                 S     <=8  Ceftazidime               S     <=1  Ceftazidime/Avibactam     S     <=4  Ceftolozane/Tazobactam    S     <=2  Ceftriaxone               S     <=1  Cefuroxime                S     <=4  Ciprofloxacin             S     <=0.25  Ertapenem                 S     <=0.5  Gentamycin                S     <=2  Imipenem                  S     <=1  Levofloxacin              S     <=0.5  Meropenem/Vaborbactam     S     <=2  Nitrofura                           ntoin            S     <=32  Piperacillin/tazobactam   S     <=8  Tetracycline              S     <=4  Tobramycin                S     <=2  Trimethoprim/sulfa        S     <=0.5/9.5    S=Susceptible   I=Intermediate    R=Resistant    INT=Interpretation   ALEC=Minimum Inhibitory Concentration  NOTE  Testing performed at:  [facility], [address] [place] [address]  [address] CLIA #:[id number]      Patient Outreach on 10/20/2022   Component Date Value Ref Range Status    BCS Recommendation External 09/01/2022 Repeat mammogram in 1 year   Final   Office Visit on 09/27/2022   Component Date Value Ref Range Status    Disclaimer 09/27/2022    Final                    Value:DISCLAIMER: Annual Pap smears are the best means now available for the early detection of cervical cancer.  There is no guarantee, however, that a particular Pap smear will  diseased cells that are present.  Thus, false negative reports may   occasionally occur.     Clinical Support on 03/09/2022   Component Date Value Ref Range Status    WBC 03/09/2022 8.0  4.6 - 10.2 10*3/uL Final    RBC 03/09/2022 4.73  4.2 - 5.4 10*6/uL Final    Hemoglobin 03/09/2022 13.3  12.0 - 16.0 g/dL Final    Hematocrit 03/09/2022 42.2  37.0 - 47.0 % Final    MCV 03/09/2022 89.2  80 - 97 fL Final    MCH 03/09/2022 28.1  27.0 - 31.2 pg Final    MCHC 03/09/2022 31.5 (L)  31.8 - 35.4 g/dL Final    RDW RBC Auto-Rto 03/09/2022 13.6  12.5 - 18.0 % Final    Platelets 03/09/2022 213  142 - 424 10*3/uL Final    Neutrophils 03/09/2022 64.6  37 - 80 % Final    Lymphocytes 03/09/2022 24.9 (L)  25 - 40 % Final    Monocytes 03/09/2022 8.0  1 - 15 % Final    Eosinophils 03/09/2022 1.4  1 - 3 % Final    Basophils 03/09/2022 0.5  0 - 3 % Final    nRBC# 03/09/2022 0.0  % Final    Neutrophils Absolute 03/09/2022 5.17  1.8 - 7.7 10*3/uL Final    Sodium 03/09/2022 141  135 - 145 mmol/L Final    Potassium 03/09/2022 4.5  3.6 - 5.2 mmol/L Final    Chloride 03/09/2022 104  100 - 108 mmol/L Final    CO2 03/09/2022 31  21 - 32 mmol/L Final    Anion Gap 03/09/2022 6.0  3.0 - 11.0 mmol/L Final    BUN 03/09/2022 16  7 - 18 mg/dL Final    Creatinine 03/09/2022 1.24 (H)  0.55 - 1.02 mg/dL Final    GFR ESTIMATION 03/09/2022 55 (L)  >60 Final               DESCRIPTION       GLOMERULAR FILTRATION RATE             NORMAL                     >60             KIDNEY  DISEASE           15-60              KIDNEY FAILURE             <15    BUN/Creatinine Ratio 03/09/2022 12.90  7 - 18 Ratio Final    Glucose 03/09/2022 93  70 - 110 mg/dL Final    Calcium 03/09/2022 9.2  8.8 - 10.5 mg/dL Final    Total Bilirubin 03/09/2022 0.1  0.0 - 1.0 mg/dL Final    AST 03/09/2022 20  15 - 37 U/L Final    ALT 03/09/2022 17  12 - 78 U/L Final    Total Protein 03/09/2022 6.4  6.4 - 8.2 g/dL Final    Albumin 03/09/2022 3.2 (L)  3.4 - 5.0 g/dL Final    Globulin 03/09/2022 3.2  2.3 - 3.5 g/dL Final    Albumin/Globulin Ratio 03/09/2022 1.000 (L)  1.1 - 1.8 Ratio Final    Alkaline Phosphatase 03/09/2022 123 (H)  46 - 116 U/L Final         Assessment:     Principle Problem: No primary diagnosis found.   Co-morbidity/Active Problems:   Patient Active Problem List   Diagnosis    Rheumatoid arthritis involving multiple sites    Sjogren's syndrome    Inflammation of sacroiliac joint    Ankylosing spondylitis    Cerebrovascular accident-2011    Essential hypertension    Hyperlipidemia    Osteoarthritis of knee    Carpal tunnel syndrome    Anxiety    Gastroesophageal reflux disease without esophagitis    Allergic rhinitis    Lumbar radiculopathy, chronic    Infiltrating ductal carcinoma of left breast, stage 2    Lung nodule    Stage 3a chronic kidney disease    Severe obesity (BMI 35.0-39.9) with comorbidity        Cindi Magaña is a 52 y.o. female with PMH of There were no encounter diagnoses., with No primary diagnosis found.     ==============================================  Principal DX:  left segmental mastectomy and axillary node dissection.   Pathological findings shows large area of invasive ductal carcinoma, histological grade 2.  1 positive axillary node for metastasis.   pathological stage IB, histological grade 2, ER +, WA +, HER-2/ind[place] -, and Ki-67 low,left  breast cancer.   OncoType 16  S/p Radiation Oncology     Stage     Metastatic Sites node, ? Lung nodule . Patient reports this was biopsied at [facility] and was  found to be non-malignant  Co-morbidities  RA      Plan:   Overall Plan:   Goal of Treatment:  Source of Guideline: NCCN version , Uptodate      1. Left Breast invasive ductal Carcinoma:     08/16/2022: Patient with Stage II breast cancer currently on Arimidex with questionable lung nodule at last visit. She had repeat scan on 6/2/2022 which showed 8mm pulmonary nodule of the lingula with slight interval decrease in  size. There is questionable calcification within the central aspect of this  Nodule.  She had left breast mastectomy, but is due for left breast yearly mammo in September 2022. Patient reports she still has occasional lymphedema to left axilla and arm. She does have a lymphedema sleeve that helps. She wears at night.  Dexa completed 03/02/2022 reviewed with patient which showed normal bone density. She was previously taking otc Calcium+D, however, was changed to rx vitamin D per pcp and is not taking calcium.  I encouraged to continue otc Calcium, may get without added vitamin D if she is already taking rx.   ==02/28/2023: Arimidex well tolerated, mammo reviewed from September 2022 which was [facility].  No masses, lumps, axillary adenopathy or skin changes to breast.  CT reviewed, patient continues to have lung nodule, previously 8mm, now 1mm. Recommended repeat ct scan in 6 months, however declines further imaging of lung lesion.        2. Lung Nodule:  ==Improved per March 2022 ct scan  ==Will continue to monitor and repeat ct scan prior to next visit    3. Long term use of Aromatase Inhibitor  ==Tolerated well  ==Dexa completed March 2022, normal bone density    Mammo right breast September 2023  Continue Arimidex  Rtc 6 months cbc cmp      Total time spent in counseling and discussion was more than 60 minutes. I discussed in detail further management options at this time and discussion on relevant tests, imaging and ancillary tests as part of the management plan.

## 2023-08-08 ENCOUNTER — OFFICE VISIT (OUTPATIENT)
Dept: FAMILY MEDICINE | Facility: CLINIC | Age: 53
End: 2023-08-08
Payer: MEDICARE

## 2023-08-08 VITALS
TEMPERATURE: 97 F | BODY MASS INDEX: 36.49 KG/M2 | HEIGHT: 65 IN | RESPIRATION RATE: 14 BRPM | DIASTOLIC BLOOD PRESSURE: 76 MMHG | SYSTOLIC BLOOD PRESSURE: 110 MMHG | OXYGEN SATURATION: 98 % | HEART RATE: 64 BPM | WEIGHT: 219 LBS

## 2023-08-08 DIAGNOSIS — I10 ESSENTIAL HYPERTENSION: ICD-10-CM

## 2023-08-08 DIAGNOSIS — E78.2 MIXED HYPERLIPIDEMIA: ICD-10-CM

## 2023-08-08 DIAGNOSIS — Z76.89 ENCOUNTER TO ESTABLISH CARE: Primary | ICD-10-CM

## 2023-08-08 DIAGNOSIS — N18.31 STAGE 3A CHRONIC KIDNEY DISEASE: ICD-10-CM

## 2023-08-08 DIAGNOSIS — R73.03 PREDIABETES: ICD-10-CM

## 2023-08-08 LAB
25(OH)D3 SERPL-MCNC: 21 NG/ML
ABS NRBC COUNT: 0 THOU/UL (ref 0–0.01)
ABSOLUTE BASOPHIL: 0 10*3/UL (ref 0–0.3)
ABSOLUTE EOSINOPHIL: 0.1 10*3/UL (ref 0–0.6)
ABSOLUTE IMMATURE GRAN: 0.02 THOU/UL (ref 0–0.03)
ABSOLUTE LYMPHOCYTE: 1.6 10*3/UL (ref 1.2–4)
ABSOLUTE MONOCYTE: 0.4 10*3/UL (ref 0.1–0.8)
ALBUMIN SERPL BCP-MCNC: 3.1 G/DL (ref 3.4–5)
ALP SERPL-CCNC: 128 U/L (ref 45–117)
ALT SERPL W P-5'-P-CCNC: 16 U/L (ref 13–56)
ANION GAP SERPL CALC-SCNC: 6 MMOL/L (ref 3–11)
AST SERPL-CCNC: 16 U/L (ref 15–37)
BASOPHILS NFR BLD: 0.3 % (ref 0–3)
BILIRUB SERPL-MCNC: 0.3 MG/DL (ref 0.2–1)
BUN SERPL-MCNC: 22 MG/DL (ref 7–18)
BUN/CREAT SERPL: 17.18 RATIO
CALCIUM SERPL-MCNC: 8.9 MG/DL (ref 8.5–10.1)
CHLORIDE SERPL-SCNC: 109 MMOL/L (ref 98–107)
CHOLEST SERPL-MSCNC: 196 MG/DL
CO2 SERPL-SCNC: 24 MMOL/L (ref 21–32)
CREAT SERPL-MCNC: 1.28 MG/DL (ref 0.55–1.02)
EOSINOPHIL NFR BLD: 1.5 % (ref 0–6)
ERYTHROCYTE [DISTWIDTH] IN BLOOD BY AUTOMATED COUNT: 14.7 % (ref 0–15.5)
ESTIMATED AVG GLUCOSE: 129 MG/DL
GFR ESTIMATION: 53
GLUCOSE SERPL-MCNC: 107 MG/DL (ref 74–106)
HBA1C MFR BLD: 5.8 % (ref 4.2–6.3)
HCT VFR BLD AUTO: 36.6 % (ref 37–47)
HDLC SERPL-MCNC: 54 MG/DL
HGB BLD-MCNC: 11.2 G/DL (ref 12–16)
IMMATURE GRANULOCYTES: 0.3 % (ref 0–0.43)
LDLC SERPL CALC-MCNC: 106.2 MG/DL
LYMPHOCYTES NFR BLD: 25.9 % (ref 20–45)
MCH RBC QN AUTO: 28.1 PG (ref 27–32)
MCHC RBC AUTO-ENTMCNC: 30.6 % (ref 32–36)
MCV RBC AUTO: 92 FL (ref 80–99)
MONOCYTES NFR BLD: 7 % (ref 2–10)
NEUTROPHILS # BLD AUTO: 3.9 10*3/UL (ref 1.4–7)
NEUTROPHILS NFR BLD: 65 % (ref 50–80)
NUCLEATED RED BLOOD CELLS: 0 % (ref 0–0.2)
PLATELETS: 202 10*3/UL (ref 130–400)
PMV BLD AUTO: 12.9 FL (ref 9.2–12.2)
POTASSIUM SERPL-SCNC: 4.3 MMOL/L (ref 3.5–5.1)
PROT SERPL-MCNC: 6.9 G/DL (ref 6.4–8.2)
RBC # BLD AUTO: 3.98 10*6/UL (ref 4.2–5.4)
SODIUM BLD-SCNC: 139 MMOL/L (ref 131–143)
T4 FREE SP9 P CHAL SERPL-SCNC: 1.04 NG/DL (ref 0.76–1.46)
TRIGL SERPL-MCNC: 179 MG/DL (ref 0–149)
TSH SERPL DL<=0.005 MIU/L-ACNC: 1.87 UIU/ML (ref 0.36–3.74)
VLDL CHOLESTEROL: 36 MG/DL
WBC # BLD: 6 10*3/UL (ref 4.5–10)

## 2023-08-08 PROCEDURE — 1160F PR REVIEW ALL MEDS BY PRESCRIBER/CLIN PHARMACIST DOCUMENTED: ICD-10-PCS | Mod: CPTII,S$GLB,, | Performed by: INTERNAL MEDICINE

## 2023-08-08 PROCEDURE — 3044F PR MOST RECENT HEMOGLOBIN A1C LEVEL <7.0%: ICD-10-PCS | Mod: CPTII,S$GLB,, | Performed by: INTERNAL MEDICINE

## 2023-08-08 PROCEDURE — 1159F PR MEDICATION LIST DOCUMENTED IN MEDICAL RECORD: ICD-10-PCS | Mod: CPTII,S$GLB,, | Performed by: INTERNAL MEDICINE

## 2023-08-08 PROCEDURE — 99214 OFFICE O/P EST MOD 30 MIN: CPT | Mod: S$GLB,,, | Performed by: INTERNAL MEDICINE

## 2023-08-08 PROCEDURE — 3074F PR MOST RECENT SYSTOLIC BLOOD PRESSURE < 130 MM HG: ICD-10-PCS | Mod: CPTII,S$GLB,, | Performed by: INTERNAL MEDICINE

## 2023-08-08 PROCEDURE — 3078F PR MOST RECENT DIASTOLIC BLOOD PRESSURE < 80 MM HG: ICD-10-PCS | Mod: CPTII,S$GLB,, | Performed by: INTERNAL MEDICINE

## 2023-08-08 PROCEDURE — 3074F SYST BP LT 130 MM HG: CPT | Mod: CPTII,S$GLB,, | Performed by: INTERNAL MEDICINE

## 2023-08-08 PROCEDURE — 3044F HG A1C LEVEL LT 7.0%: CPT | Mod: CPTII,S$GLB,, | Performed by: INTERNAL MEDICINE

## 2023-08-08 PROCEDURE — 3008F BODY MASS INDEX DOCD: CPT | Mod: CPTII,S$GLB,, | Performed by: INTERNAL MEDICINE

## 2023-08-08 PROCEDURE — 1160F RVW MEDS BY RX/DR IN RCRD: CPT | Mod: CPTII,S$GLB,, | Performed by: INTERNAL MEDICINE

## 2023-08-08 PROCEDURE — 3078F DIAST BP <80 MM HG: CPT | Mod: CPTII,S$GLB,, | Performed by: INTERNAL MEDICINE

## 2023-08-08 PROCEDURE — 4010F PR ACE/ARB THEARPY RXD/TAKEN: ICD-10-PCS | Mod: CPTII,S$GLB,, | Performed by: INTERNAL MEDICINE

## 2023-08-08 PROCEDURE — 4010F ACE/ARB THERAPY RXD/TAKEN: CPT | Mod: CPTII,S$GLB,, | Performed by: INTERNAL MEDICINE

## 2023-08-08 PROCEDURE — 1159F MED LIST DOCD IN RCRD: CPT | Mod: CPTII,S$GLB,, | Performed by: INTERNAL MEDICINE

## 2023-08-08 PROCEDURE — 99214 PR OFFICE/OUTPT VISIT, EST, LEVL IV, 30-39 MIN: ICD-10-PCS | Mod: S$GLB,,, | Performed by: INTERNAL MEDICINE

## 2023-08-08 PROCEDURE — 3008F PR BODY MASS INDEX (BMI) DOCUMENTED: ICD-10-PCS | Mod: CPTII,S$GLB,, | Performed by: INTERNAL MEDICINE

## 2023-08-08 RX ORDER — BENAZEPRIL HYDROCHLORIDE 10 MG/1
10 TABLET ORAL DAILY
Qty: 90 TABLET | Refills: 1 | Status: SHIPPED | OUTPATIENT
Start: 2023-08-08 | End: 2024-01-10 | Stop reason: SDUPTHER

## 2023-08-08 RX ORDER — CARVEDILOL 25 MG/1
25 TABLET ORAL 2 TIMES DAILY WITH MEALS
Qty: 180 TABLET | Refills: 1 | Status: SHIPPED | OUTPATIENT
Start: 2023-08-08

## 2023-08-08 RX ORDER — PRAVASTATIN SODIUM 80 MG/1
80 TABLET ORAL NIGHTLY
Qty: 90 TABLET | Refills: 3 | Status: SHIPPED | OUTPATIENT
Start: 2023-08-08

## 2023-08-08 RX ORDER — MUPIROCIN 20 MG/G
OINTMENT TOPICAL 3 TIMES DAILY PRN
Qty: 30 G | Refills: 2 | Status: SHIPPED | OUTPATIENT
Start: 2023-08-08

## 2023-08-08 NOTE — PROGRESS NOTES
Subjective:       Patient ID: Cindi Magaña is a 52 y.o. female.    Chief Complaint: Establish Care (Patient states that sometimes she seems light headed. )    HPI    52 y.o. female with Active Diagnosis Review (HCC)     Chronic             HCC 12 - Breast, Prostate, and Other Cancers and Tumors     Infiltrating ductal carcinoma of left breast, stage 2 [C50.912]     HCC 22 - Morbid Obesity     Severe obesity (BMI 35.0-39.9) with comorbidity [E66.01]     HCC 40 - Rheumatoid Arthritis and Inflammatory Connective Tissue Disease       Sjogren's syndrome, with unspecified organ involvement [M35.00]     Rheumatoid arthritis involving multiple sites [M06.9]     Sjogren's syndrome [M35.00]     Inflammation of sacroiliac joint [M46.1]     Ankylosing spondylitis [M45.9]      - Chronic Kidney Disease, Moderate (Stage 3)     Stage 3a chronic kidney disease [N18.31]          Acute              - Ischemic or Unspecified Stroke     Cerebrovascular accident (CVA), unspecified mechanism [I63.9]     Cerebrovascular accident [I63.9]           here to establish care.       Health Maintenance Topics with due status: Not Due       Topic Last Completion Date    Colorectal Cancer Screening 07/12/2018    Influenza Vaccine 09/20/2022    Cervical Cancer Screening 09/27/2022    Hemoglobin A1c (Prediabetes) 02/08/2023    Lipid Panel 02/08/2023    High Dose Statin 08/08/2023       Health Maintenance Due   Topic Date Due    Pneumococcal Vaccines (Age 0-64) (1 - PCV) Never done    HIV Screening  Never done    TETANUS VACCINE  Never done    Shingles Vaccine (2 of 2) 10/12/2020    COVID-19 Vaccine (3 - Booster for Eric series) 04/05/2022    Mammogram  09/01/2023         Medical Problems:      Patient Active Problem List   Diagnosis    Rheumatoid arthritis involving multiple sites    Sjogren's syndrome    Inflammation of sacroiliac joint    Ankylosing spondylitis    Cerebrovascular accident-2011    Essential hypertension     Hyperlipidemia    Osteoarthritis of knee    Carpal tunnel syndrome    Anxiety    Gastroesophageal reflux disease without esophagitis    Allergic rhinitis    Lumbar radiculopathy, chronic    Infiltrating ductal carcinoma of left breast, stage 2    Lung nodule    Stage 3a chronic kidney disease    Severe obesity (BMI 35.0-39.9) with comorbidity    Prediabetes       Current Outpatient Medications on File Prior to Visit   Medication Sig Dispense Refill    allopurinoL (ZYLOPRIM) 300 MG tablet Take 1 tablet (300 mg total) by mouth once daily. 90 tablet 3    anastrozole (ARIMIDEX) 1 mg Tab Take 1 tablet (1 mg total) by mouth once daily. 90 tablet 4    aspirin (ECOTRIN) 81 MG EC tablet Aspir-81 81 mg tablet,delayed release   Take 1 tablet every day by oral route.      fluticasone propionate (FLONASE) 50 mcg/actuation nasal spray SHAKE LQ AND U 1 SPR IEN BID 18.2 mL 6    folic acid (FOLVITE) 1 MG tablet folic acid 1 mg tablet   TK 2 TS PO QD      furosemide (LASIX) 20 MG tablet furosemide 20 mg tablet      gabapentin (NEURONTIN) 300 MG capsule Take 600 mg by mouth.      HYDROcodone-acetaminophen (NORCO)  mg per tablet Take 1 tablet by mouth.      pantoprazole (PROTONIX) 40 MG tablet TK 1 T PO QD 90 tablet 3    tiZANidine (ZANAFLEX) 4 MG tablet Take 4 mg by mouth 3 (three) times daily.      venlafaxine (EFFEXOR-XR) 75 MG 24 hr capsule TK 1 C PO QD 90 capsule 3    [DISCONTINUED] benazepriL (LOTENSIN) 10 MG tablet TAKE 1 TABLET(10 MG) BY MOUTH EVERY DAY 90 tablet 3    [DISCONTINUED] carvediloL (COREG) 25 MG tablet Take 1 tablet (25 mg total) by mouth 2 (two) times daily with meals. 180 tablet 3    [DISCONTINUED] mupirocin (BACTROBAN) 2 % ointment mupirocin 2 % topical ointment      [DISCONTINUED] pravastatin (PRAVACHOL) 80 MG tablet Take 1 tablet (80 mg total) by mouth every evening. 90 tablet 3    [DISCONTINUED] azithromycin (ZITHROMAX Z-EMILEE) 250 MG tablet Take per direction 5 tablet 0     No current  facility-administered medications on file prior to visit.           Past Medical History:   Diagnosis Date    Breast cancer     Cancer     Heart disease     Hyperlipidemia     Hypertension     Kidney disease     Obesity     Rheumatoid arthritis involving multiple sites 2/27/2019    Stroke     Stroke      Past Surgical History:   Procedure Laterality Date    MASTECTOMY       Family History   Problem Relation Age of Onset    Hypertension Mother     Hypertension Father     Hypertension Sister     Lupus Sister     Hypertension Brother      Social History     Socioeconomic History    Marital status: Single   Tobacco Use    Smoking status: Former     Current packs/day: 0.00    Smokeless tobacco: Never   Substance and Sexual Activity    Alcohol use: Not Currently    Drug use: Never    Sexual activity: Not Currently     Partners: Male     Birth control/protection: None     Social Determinants of Health     Social Connections: Unknown (4/17/2019)    Social Connection and Isolation Panel [NHANES]     Marital Status: Never      Review of patient's allergies indicates:  No Known Allergies    Current Outpatient Medications:     allopurinoL (ZYLOPRIM) 300 MG tablet, Take 1 tablet (300 mg total) by mouth once daily., Disp: 90 tablet, Rfl: 3    anastrozole (ARIMIDEX) 1 mg Tab, Take 1 tablet (1 mg total) by mouth once daily., Disp: 90 tablet, Rfl: 4    aspirin (ECOTRIN) 81 MG EC tablet, Aspir-81 81 mg tablet,delayed release  Take 1 tablet every day by oral route., Disp: , Rfl:     fluticasone propionate (FLONASE) 50 mcg/actuation nasal spray, SHAKE LQ AND U 1 SPR IEN BID, Disp: 18.2 mL, Rfl: 6    folic acid (FOLVITE) 1 MG tablet, folic acid 1 mg tablet  TK 2 TS PO QD, Disp: , Rfl:     furosemide (LASIX) 20 MG tablet, furosemide 20 mg tablet, Disp: , Rfl:     gabapentin (NEURONTIN) 300 MG capsule, Take 600 mg by mouth., Disp: , Rfl:     HYDROcodone-acetaminophen (NORCO)  mg per tablet, Take 1 tablet by mouth., Disp: ,  "Rfl:     pantoprazole (PROTONIX) 40 MG tablet, TK 1 T PO QD, Disp: 90 tablet, Rfl: 3    tiZANidine (ZANAFLEX) 4 MG tablet, Take 4 mg by mouth 3 (three) times daily., Disp: , Rfl:     venlafaxine (EFFEXOR-XR) 75 MG 24 hr capsule, TK 1 C PO QD, Disp: 90 capsule, Rfl: 3    benazepriL (LOTENSIN) 10 MG tablet, Take 1 tablet (10 mg total) by mouth once daily., Disp: 90 tablet, Rfl: 1    carvediloL (COREG) 25 MG tablet, Take 1 tablet (25 mg total) by mouth 2 (two) times daily with meals., Disp: 180 tablet, Rfl: 1    mupirocin (BACTROBAN) 2 % ointment, Apply topically 3 (three) times daily as needed (wound)., Disp: 30 g, Rfl: 2    pravastatin (PRAVACHOL) 80 MG tablet, Take 1 tablet (80 mg total) by mouth every evening., Disp: 90 tablet, Rfl: 3        Review of Systems   Constitutional:  Negative for diaphoresis and fever.   HENT:  Negative for trouble swallowing.    Respiratory:  Negative for cough and shortness of breath.    Cardiovascular:  Negative for chest pain and leg swelling.   Gastrointestinal:  Negative for abdominal pain and blood in stool.   Genitourinary:  Negative for difficulty urinating and dysuria.   Musculoskeletal:  Positive for arthralgias. Negative for gait problem.   Skin:  Negative for pallor.   Neurological:  Positive for light-headedness. Negative for syncope and weakness.       Objective:        Vitals:    08/08/23 0827   BP: 110/76   BP Location: Left arm   Patient Position: Sitting   BP Method: Large (Manual)   Pulse: 64   Resp: 14   Temp: 97.3 °F (36.3 °C)   TempSrc: Temporal   SpO2: 98%   Weight: 99.3 kg (219 lb)   Height: 5' 5" (1.651 m)       Body mass index is 36.44 kg/m².    Physical Exam  Constitutional:       General: She is not in acute distress.     Appearance: She is well-developed. She is not diaphoretic.   HENT:      Head: Normocephalic and atraumatic.      Right Ear: External ear normal.      Left Ear: External ear normal.   Cardiovascular:      Rate and Rhythm: Normal rate and " regular rhythm.   Pulmonary:      Effort: Pulmonary effort is normal.      Breath sounds: Normal breath sounds.   Abdominal:      General: There is no distension.      Palpations: Abdomen is soft.   Musculoskeletal:      Cervical back: Neck supple.      Right lower leg: No edema.      Left lower leg: No edema.   Skin:     General: Skin is warm and dry.      Nails: There is no clubbing.   Neurological:      General: No focal deficit present.      Mental Status: She is alert.   Psychiatric:         Behavior: Behavior normal.         Judgment: Judgment normal.         Assessment:     1. Encounter to establish care    2. Stage 3a chronic kidney disease    3. Essential hypertension    4. Mixed hyperlipidemia    5. Prediabetes           Plan:         1. Encounter to establish care  - reviewed healthcare maintenance and pt's chronic medical problems.   - labs reviewed, ordered  - immunizations reviewed  - CRC screen utd  - pap/gyn utd  - mammo - per gyn  - CBC Auto Differential  - Comprehensive Metabolic Panel  - Hemoglobin A1C  - Lipid Panel  - TSH  - T4, Free  - Vitamin D    2. Stage 3a chronic kidney disease  - chronic, stable. Monitor w/ labs. Oral hydration. Avoid nsaids.  - discussed importance of oral hydration in light of reported light headed symptoms and excessive heat warnings locally. Pt expresses understanding.   - CBC Auto Differential  - Comprehensive Metabolic Panel  - Vitamin D    3. Essential hypertension  - controlled. See above regarding orthostatic symptoms  - TSH  - T4, Free  - benazepriL (LOTENSIN) 10 MG tablet; Take 1 tablet (10 mg total) by mouth once daily.  Dispense: 90 tablet; Refill: 1  - carvediloL (COREG) 25 MG tablet; Take 1 tablet (25 mg total) by mouth 2 (two) times daily with meals.  Dispense: 180 tablet; Refill: 1    4. Mixed hyperlipidemia    - Lipid Panel  - pravastatin (PRAVACHOL) 80 MG tablet; Take 1 tablet (80 mg total) by mouth every evening.  Dispense: 90 tablet; Refill: 3    5.  Prediabetes    - Hemoglobin A1C              Unless there are intervening problems, Follow up in about 6 months (around 2/8/2024), or if symptoms worsen or fail to improve, for follow up.      Patient note was created using MModal Dictation.  Any errors in syntax or even information may not have been identified and edited on initial review prior to signing this note.    I spent a total of 36 minutes on the day of the visit.        This includes face to face time and non-face to face time preparing to see the patient (eg, review of tests), obtaining and/or reviewing separately obtained history, documenting clinical information in the electronic or other health record, independently interpreting results and communicating results to the patient/family/caregiver, or care coordinator.

## 2023-08-09 ENCOUNTER — TELEPHONE (OUTPATIENT)
Dept: FAMILY MEDICINE | Facility: CLINIC | Age: 53
End: 2023-08-09
Payer: MEDICARE

## 2023-08-09 NOTE — PROGRESS NOTES
Please call patient with test results:  Cholesterol in good range - continue pravastatin.  Prediabetes is stable.  Kidney function stable/ slightly improved from baseline.  Vitamin D is low. I recommend over-the-counter vitamin D3 2,000 IU daily.   Blood count in acceptable range.   Thyroid hormone labs normal.

## 2023-08-09 NOTE — TELEPHONE ENCOUNTER
----- Message from Dalila Miles MD sent at 8/9/2023  8:40 AM CDT -----  Please call patient with test results:  Cholesterol in good range - continue pravastatin.  Prediabetes is stable.  Kidney function stable/ slightly improved from baseline.  Vitamin D is low. I recommend over-the-counter vitamin D3 2,000 IU daily.   Blood count in acceptable range.   Thyroid hormone labs normal.

## 2023-08-24 DIAGNOSIS — C50.912 INFILTRATING DUCTAL CARCINOMA OF LEFT BREAST, STAGE 2: Primary | ICD-10-CM

## 2023-08-25 LAB
ALBUMIN SERPL BCP-MCNC: 3.2 G/DL (ref 3.4–5)
ALBUMIN/GLOBULIN RATIO: 0.97 RATIO (ref 1.1–1.8)
ALP SERPL-CCNC: 139 U/L (ref 46–116)
ALT SERPL W P-5'-P-CCNC: 11 U/L (ref 12–78)
ANION GAP SERPL CALC-SCNC: 7 MMOL/L (ref 3–11)
AST SERPL-CCNC: 21 U/L (ref 15–37)
BASOPHILS NFR BLD: 0.5 % (ref 0–3)
BILIRUB SERPL-MCNC: 0.3 MG/DL (ref 0–1)
BUN SERPL-MCNC: 17 MG/DL (ref 7–18)
BUN/CREAT SERPL: 14.28 RATIO (ref 7–18)
CALCIUM SERPL-MCNC: 8.8 MG/DL (ref 8.8–10.5)
CHLORIDE SERPL-SCNC: 101 MMOL/L (ref 100–108)
CO2 SERPL-SCNC: 29 MMOL/L (ref 21–32)
CREAT SERPL-MCNC: 1.19 MG/DL (ref 0.55–1.02)
EOSINOPHIL NFR BLD: 1.5 % (ref 1–3)
ERYTHROCYTE [DISTWIDTH] IN BLOOD BY AUTOMATED COUNT: 14.9 % (ref 12.5–18)
GFR ESTIMATION: 57
GLOBULIN: 3.3 G/DL (ref 2.3–3.5)
GLUCOSE SERPL-MCNC: 101 MG/DL (ref 70–110)
HCT VFR BLD AUTO: 34.4 % (ref 37–47)
HGB BLD-MCNC: 11 G/DL (ref 12–16)
LYMPHOCYTES NFR BLD: 27 % (ref 25–40)
MCH RBC QN AUTO: 28.8 PG (ref 27–31.2)
MCHC RBC AUTO-ENTMCNC: 32 G/DL (ref 31.8–35.4)
MCV RBC AUTO: 90.1 FL (ref 80–97)
MONOCYTES NFR BLD: 8.3 % (ref 1–15)
NEUTROPHILS # BLD AUTO: 5.1 10*3/UL (ref 1.8–7.7)
NEUTROPHILS NFR BLD: 62.1 % (ref 37–80)
NUCLEATED RED BLOOD CELLS: 0 %
PLATELETS: 195 10*3/UL (ref 142–424)
POTASSIUM SERPL-SCNC: 4.3 MMOL/L (ref 3.6–5.2)
PROT SERPL-MCNC: 6.5 G/DL (ref 6.4–8.2)
RBC # BLD AUTO: 3.82 10*6/UL (ref 4.2–5.4)
SODIUM BLD-SCNC: 137 MMOL/L (ref 135–145)
WBC # BLD: 8.2 10*3/UL (ref 4.6–10.2)

## 2023-08-28 NOTE — PROGRESS NOTES
Medical Oncology Progress Note  [facility]     PATIENT: Cindi Magaña  : 1970 52 y.o. female  MRN [id number]     PCP: Dalila Miles MD  Consult Requested By:      Date of Service: 2023    Subjective:   Interim History:    No chief complaint on file.    Cindi Magaña is here for f/up  Visit.    The patient has surgery and radiation therapy to the left breast at [facility] in 2020 without complications..  The patient here for follow-up visit and she has chronic pain she is on Arimidex 1 milligram p.o. daily.  She was previously being followed by Dr. Homero Vega then Dr. Diego 2022.    Oncology History:    Left Breast Invasive ductal carcinoma, grade 1, ER 93%, OK 75%, HER-2 negative (score 0), Ki-67 8%, and ductal carcinoma in situ, grade 1, cribriform type with no necrosis and focally associated fine needed microcalcifications, very focal     Infiltrating duct carcinoma of upper inner quadrant of left female breast     ==10/18/2019 Initial Diagnosis       10/18/19 Screening [facility]: -new focal area of architectural distortion located near the 10 o'clock position of the middle left breast that is worrisome for malignancy.  No definite associated mass or suspicious calcification.    Left Breast US:   ==10:00 zone 2 position irregular hypoechoic shadowing mass correlates with mammographic abnormality and measures 1×1.4×1.4 cm.     ==2019 Biopsy   [facility]  Invasive ductal carcinoma, grade 1, ER 93%, OK 75%, HER-2 negative (score 0), Ki-67 8%, and ductal carcinoma in situ, grade 1, cribriform type with no necrosis      [facility] Bi[facility]  Left Breast 9:00 core bx with [place] coil-shaped clip placement: INVASIVE DUCTAL CARCINOMA WITH FOCAL NEUROENDOCRINE DIFFERENTIATION, INTERMEDIATE NUCLEAR GRADE, JAYDON HISTOLOGIC GRADE 1, ER/OK 95, HER2 negative (0)  Left axillary lymph node FNA with [place] U-shaped clip placement: metastatic  adenocarcinoma     ==1/30/2020 Lung Bx [facility]. Lung, left upper lobe, FNA  No malignant cells identified  Rare bronchial epithelial cells present    ==3/2/2020 Staging form: Breast, AJCC 8th Edition  - Pathologic stage from 3/2/2020: Stage IB (pT3, pN1a, cM0, G2, ER+, CO+, HER2-) - Signed by Serafin Schneider MD on 3/3/2020  3/2020:   ==Oncotype DX recurrence score is 16 with distant recurrence risk at 9 years estimated to be 15% with hormonal therapy alone. There is no apparent benefit from chemotherapy. She will proceed with hormonal therapy alone with anastrozole 1 mg PO daily.   ==CT left lung nodule; per patient, this is old finding  ==08/06/2022: CT chest 8mm pulmonary nodule of the lingula with slight interval decrease in   size. There is questionable calcification within the central aspect of this    nodule.     Oncology History    No history exists.       Past Medical History:   Past Medical History:   Diagnosis Date    Breast cancer     Cancer     Heart disease     Hyperlipidemia     Hypertension     Kidney disease     Obesity     Rheumatoid arthritis involving multiple sites 2/27/2019    Stroke     Stroke        Past Surgical HIstory:   Past Surgical History:   Procedure Laterality Date    MASTECTOMY         Allergies:  Review of patient's allergies indicates:  No Known Allergies    Medications:  Current Outpatient Medications   Medication Sig Dispense Refill    allopurinoL (ZYLOPRIM) 300 MG tablet Take 1 tablet (300 mg total) by mouth once daily. 90 tablet 3    anastrozole (ARIMIDEX) 1 mg Tab Take 1 tablet (1 mg total) by mouth once daily. 90 tablet 4    aspirin (ECOTRIN) 81 MG EC tablet Aspir-81 81 mg tablet,delayed release   Take 1 tablet every day by oral route.      benazepriL (LOTENSIN) 10 MG tablet Take 1 tablet (10 mg total) by mouth once daily. 90 tablet 1    carvediloL (COREG) 25 MG tablet Take 1 tablet (25 mg total) by mouth 2 (two) times daily with meals. 180 tablet 1    fluticasone  propionate (FLONASE) 50 mcg/actuation nasal spray SHAKE LQ AND U 1 SPR IEN BID 18.2 mL 6    folic acid (FOLVITE) 1 MG tablet folic acid 1 mg tablet   TK 2 TS PO QD      furosemide (LASIX) 20 MG tablet furosemide 20 mg tablet      gabapentin (NEURONTIN) 300 MG capsule Take 600 mg by mouth.      HYDROcodone-acetaminophen (NORCO)  mg per tablet Take 1 tablet by mouth.      mupirocin (BACTROBAN) 2 % ointment Apply topically 3 (three) times daily as needed (wound). 30 g 2    pantoprazole (PROTONIX) 40 MG tablet TK 1 T PO QD 90 tablet 3    pravastatin (PRAVACHOL) 80 MG tablet Take 1 tablet (80 mg total) by mouth every evening. 90 tablet 3    tiZANidine (ZANAFLEX) 4 MG tablet Take 4 mg by mouth 3 (three) times daily.      venlafaxine (EFFEXOR-XR) 75 MG 24 hr capsule TK 1 C PO QD 90 capsule 3     No current facility-administered medications for this visit.       Family History:   Family History   Problem Relation Age of Onset    Hypertension Mother     Hypertension Father     Hypertension Sister     Lupus Sister     Hypertension Brother        Social History:  reports that she has quit smoking. She has never used smokeless tobacco. She reports that she does not currently use alcohol. She reports that she does not use drugs.    Review of Systemas    Constitutional: No change in weight, appetie, fatigue, fever, or night sweats  Eyes: No changes in vision  Ears, Nose, Mouth, Throat, and Face: No hearing problems, ear pain, rummy nose, or sore throat  Respiratory: No shortness of breath or cough  Cardiovascular: No chest pain, palpitations or dizziness  Gastrointestinal: No abdominal pain, hematochezia, melena  Genitourinary: No dysuria, hematuria, nocturia, menstrual problems, post menopausal  Integumentary/Breast: No rashes or itching  Hematologic/Lymphatic: No anemia or bleeding abnormalities  Musculoskeletal: No joint or muscle abnormalities  Neurological: No sensory or motor problems, no headaches  Behavioral/Psych:  No depression, anxiety, cognitive problems, or stress  Endocrine: No diabetes or thyroid problem  Allergic/Immunologic: See allergy above    Physical Exam      Vitals:   There were no vitals filed for this visit.  BMI: There is no height or weight on file to calculate BMI.  BSA There is no height or weight on file to calculate BSA.  ECOG Performance Status:   ECOG SCORE           GENERAL APPEARANCE: Well developed, well nourished, in no acute distress.  SKIN: Inspection of the skin reveals no rashes, ulcerations or petechiae.  HEENT: The sclerae were anicteric and conjunctivae were pink and moist. Extraocular movements were intact and pupils were equal, round, and reactive to light with normal accommodation. External inspection of the ears and nose showed no scars, lesions, or masses. Lips, teeth, and gums showed normal mucosa. The oral mucosa, hard and soft palate, tongue and posterior pharynx were normal.  NECK: Supple and symmetric. There was no thyroid enlargement, and no tenderness, or masses were felt.  CRESPIRATORY: Normal AP diameter and normal contour without any kyphoscoliosis. LUNGS: Auscultation of the lungs revealed normal breath sounds without any other adventitious sounds or rubs.  CARDIOVASCULAR: There was a regular rate and rhythm without any murmurs, gallops, rubs. The carotid pulses were normal and 2+ bilaterally without bruits. Peripheral pulses were 2+ and symmetric.  GASTROINTESTINAL: Soft and nontender with normal bowel sounds. The liver span was approximately 5-6 cm in the right midclavicular line by percussion. The liver edge was nontender. The spleen was not palpable. There were no inguinal or umbilical hernias noted. No ascites was noted.  LYMPH NODES: No lymphadenopathy was appreciated in the neck, axillae or groin.   MUSCULOSKELETAL: Gait was normal. There was no tenderness or effusions noted. Muscle strength and tone were normal. EXTREMITIES: No cyanosis, clubbing. +ve for Lymphedema in  left upper extremitie  NEUROLOGIC: Alert and oriented x 3. Normal affect. Gait was normal. Normal deep tendon reflexes with no pathological reflexes. Sensation to touch was normal.  PSYCHIATRIC: good mood, orientated to place, time and person     Labs and Imagings     Orders Only on 08/24/2023   Component Date Value Ref Range Status    WBC 08/25/2023 8.2  4.6 - 10.2 10*3/uL Final    RBC 08/25/2023 3.82 (L)  4.2 - 5.4 10*6/uL Final    Hemoglobin 08/25/2023 11.0 (L)  12.0 - 16.0 g/dL Final    Hematocrit 08/25/2023 34.4 (L)  37.0 - 47.0 % Final    MCV 08/25/2023 90.1  80 - 97 fL Final    MCH 08/25/2023 28.8  27.0 - 31.2 pg Final    MCHC 08/25/2023 32.0  31.8 - 35.4 g/dL Final    RDW RBC Auto-Rto 08/25/2023 14.9  12.5 - 18.0 % Final    Platelets 08/25/2023 195  142 - 424 10*3/uL Final    Neutrophils 08/25/2023 62.1  37 - 80 % Final    Lymphocytes 08/25/2023 27.0  25 - 40 % Final    Monocytes 08/25/2023 8.3  1 - 15 % Final    Eosinophils 08/25/2023 1.5  1 - 3 % Final    Basophils 08/25/2023 0.5  0 - 3 % Final    nRBC# 08/25/2023 0.0  % Final    Neutrophils Absolute 08/25/2023 5.10  1.8 - 7.7 10*3/uL Final    Sodium 08/25/2023 137  135 - 145 mmol/L Final    Potassium 08/25/2023 4.3  3.6 - 5.2 mmol/L Final    Chloride 08/25/2023 101  100 - 108 mmol/L Final    CO2 08/25/2023 29  21 - 32 mmol/L Final    Anion Gap 08/25/2023 7.0  3.0 - 11.0 mmol/L Final    BUN 08/25/2023 17  7 - 18 mg/dL Final    Creatinine 08/25/2023 1.19 (H)  0.55 - 1.02 mg/dL Final    GFR ESTIMATION 08/25/2023 57 (L)  >60 Final               DESCRIPTION       GLOMERULAR FILTRATION RATE             NORMAL                     >60             KIDNEY  DISEASE           15-60             KIDNEY FAILURE             <15    BUN/Creatinine Ratio 08/25/2023 14.28  7 - 18 Ratio Final    Glucose 08/25/2023 101  70 - 110 mg/dL Final    Calcium 08/25/2023 8.8  8.8 - 10.5 mg/dL Final    Total Bilirubin 08/25/2023 0.3  0.0 - 1.0 mg/dL Final    AST 08/25/2023 21  15 - 37  U/L Final    ALT 08/25/2023 11 (L)  12 - 78 U/L Final    Total Protein 08/25/2023 6.5  6.4 - 8.2 g/dL Final    Albumin 08/25/2023 3.2 (L)  3.4 - 5.0 g/dL Final    Globulin 08/25/2023 3.3  2.3 - 3.5 g/dL Final    Albumin/Globulin Ratio 08/25/2023 0.969 (L)  1.1 - 1.8 Ratio Final    Alkaline Phosphatase 08/25/2023 139 (H)  46 - 116 U/L Final   Office Visit on 08/08/2023   Component Date Value Ref Range Status    WBC 08/08/2023 6.0  4.5 - 10.0 10*3/uL Final    RBC 08/08/2023 3.98 (L)  4.20 - 5.40 10*6/uL Final    Hemoglobin 08/08/2023 11.2 (L)  12.0 - 16.0 g/dL Final    Hematocrit 08/08/2023 36.6 (L)  37.0 - 47.0 % Final    MCV 08/08/2023 92.0  80.0 - 99.0 fL Final    MCH 08/08/2023 28.1  27.0 - 32.0 pg Final    MCHC 08/08/2023 30.6 (L)  32.0 - 36.0 % Final    RDW RBC Auto-Rto 08/08/2023 14.7  0.0 - 15.5 % Final    Platelets 08/08/2023 202  130 - 400 10*3/uL Final    MPV 08/08/2023 12.9 (H)  9.2 - 12.2 fL Final    Neutrophils 08/08/2023 65.0  50 - 80 % Final    Immature Granulocytes 08/08/2023 0.30  0.0 - 0.43 % Final    Lymphocytes 08/08/2023 25.9  20.0 - 45.0 % Final    Monocytes 08/08/2023 7.0  2 - 10 % Final    Eosinophils 08/08/2023 1.5  0 - 6 % Final    Basophils 08/08/2023 0.3  0 - 3 % Final    nRBC# 08/08/2023 0.0  0 - 0.2 % Final    Neutrophils Absolute 08/08/2023 3.9  1.4 - 7.0 10*3/uL Final    Immature Granulocytes Absolute 08/08/2023 0.0200  0.0 - 0.0310 thou/uL Final    Lymphocytes Absolute 08/08/2023 1.6  1.2 - 4.0 10*3/uL Final    Monocytes Absolute 08/08/2023 0.4  0.1 - 0.8 10*3/uL Final    Eosinophils Absolute 08/08/2023 0.1  0.0 - 0.6 10*3/uL Final    Basophils Absolute 08/08/2023 0.0  0.0 - 0.3 10*3/uL Final    nRBC Count Absolute 08/08/2023 0.000  0 - 0.012 thou/uL Final    Sodium 08/08/2023 139  131 - 143 mmol/L Final    Potassium 08/08/2023 4.3  3.5 - 5.1 mmol/L Final    Chloride 08/08/2023 109 (H)  98 - 107 mmol/L Final    CO2 08/08/2023 24  21 - 32 mmol/L Final    Anion Gap 08/08/2023 6.0  3.0 -  11.0 mmol/L Final    BUN 08/08/2023 22.0 (H)  7.0 - 18.0 mg/dL Final    Creatinine 08/08/2023 1.28 (H)  0.55 - 1.02 mg/dL Final    GFR ESTIMATION 08/08/2023 53 (L)  >60 Final    BUN/Creatinine Ratio 08/08/2023 17.18  Ratio Final    Glucose 08/08/2023 107 (H)  74 - 106 mg/dL Final    Calcium 08/08/2023 8.9  8.5 - 10.1 mg/dL Final    Total Bilirubin 08/08/2023 0.3  0.2 - 1.0 mg/dL Final    AST 08/08/2023 16  15 - 37 U/L Final    ALT 08/08/2023 16  13 - 56 U/L Final    Total Protein 08/08/2023 6.9  6.4 - 8.2 g/dL Final    Albumin 08/08/2023 3.1 (L)  3.4 - 5.0 g/dL Final    Alkaline Phosphatase 08/08/2023 128 (H)  45 - 117 U/L Final    Estimated Avg Glucose 08/08/2023 129  mg/dL Final    Hemoglobin A1C 08/08/2023 5.8  4.2 - 6.3 % Final    Comment: Ranges for Hemoglobin A1C in diabetic patients:Hemoglobin A1C          Degree of glucose control      <7.0%             Well-controlled      >8.0%             Poorly-controlledDegrees of glucose control based on goals set by theAmerican Diabetes   Association.      Cholesterol 08/08/2023 196  <200 mg/dL Final    Comment: The National Cholesterol Education Program has publishedreference Cholesterol values for cardiovascular risk to be:            Low risk................<200 mg/dL          Borderline risk.........201 - 239 mg/dL        High risk...............240 mg/dl   and greater      Triglycerides 08/08/2023 179 (H)  0 - 149 mg/dL Final    Ranges:Normal...............<150 mg/dLBorderline Risk......150-199 mg/dLHigh.................200-499 mg/dLVery High............>500 mg/dL    HDL 08/08/2023 54  >39 mg/dL Final    Comment: The National Cholesterol Education Program Adult TreatmentPanel III (NCEP-ATP III) provides the followingclassifications of HDL-C concentrations:HDL cholesterol  < 40 mg/dL  Low HDL cholesterolHDL cholesterol >= 60 mg/dL  High HDL cholesterolThe   following guidelines apply to HDL-Cholesterol: Good prognosis   Standard Risk   Risk Indicator   >60 mg/dL        40-59 mg/dL     <40 mg/dL      LDL Cholesterol 08/08/2023 106.2  mg/dL Final    Reference Range / Risk Factor Levels:        Optimal:   < 100   mg/dL  Above Optimal:   100-129 mg/dLBorderline High:   130-159 mg/dL           High:   160-189 mg/dL      Very High:   > 190   mg/dL    VLDL 08/08/2023 36  mg/dL Final    Vit D, 25-Hydroxy 08/08/2023 21 (L)  >30 ng/mL Final    Comment: Vitamin D deficiency has been defined by the [place] of[employer] and an Endocrine Society practice guideline as alevel of serum 25-OH vitamin D less than 20 ng/mL. TheEndocrine Society further defined vitamin D insufficiency asa level between 21 and 29   ng/mL.      Free T4 08/08/2023 1.04  0.76 - 1.46 ng/dL Final    TSH 08/08/2023 1.870  0.358 - 3.74 uIU/mL Final   Orders Only on 02/23/2023   Component Date Value Ref Range Status    Sodium 02/27/2023 137  135 - 145 mmol/L Final    Potassium 02/27/2023 4.6  3.6 - 5.2 mmol/L Final    Chloride 02/27/2023 104  100 - 108 mmol/L Final    CO2 02/27/2023 26  21 - 32 mmol/L Final    Anion Gap 02/27/2023 7.0  3.0 - 11.0 mmol/L Final    BUN 02/27/2023 24 (H)  7 - 18 mg/dL Final    Creatinine 02/27/2023 1.44 (H)  0.55 - 1.02 mg/dL Final    GFR ESTIMATION 02/27/2023 46 (L)  >60 Final               DESCRIPTION       GLOMERULAR FILTRATION RATE             NORMAL                     >60             KIDNEY  DISEASE           15-60             KIDNEY FAILURE             <15    BUN/Creatinine Ratio 02/27/2023 16.66  7 - 18 Ratio Final    Glucose 02/27/2023 117 (H)  70 - 110 mg/dL Final    Calcium 02/27/2023 9.0  8.8 - 10.5 mg/dL Final    Total Bilirubin 02/27/2023 0.3  0.0 - 1.0 mg/dL Final    AST 02/27/2023 25  15 - 37 U/L Final    ALT 02/27/2023 11 (L)  12 - 78 U/L Final    Total Protein 02/27/2023 7.4  6.4 - 8.2 g/dL Final    Albumin 02/27/2023 3.0 (L)  3.4 - 5.0 g/dL Final    Globulin 02/27/2023 4.4 (H)  2.3 - 3.5 g/dL Final    Albumin/Globulin Ratio 02/27/2023 0.681 (L)  1.1 - 1.8 Ratio Final     Alkaline Phosphatase 02/27/2023 138 (H)  46 - 116 U/L Final    WBC 02/27/2023 5.7  4.6 - 10.2 10*3/uL Final    RBC 02/27/2023 4.34  4.2 - 5.4 10*6/uL Final    Hemoglobin 02/27/2023 12.2  12.0 - 16.0 g/dL Final    Hematocrit 02/27/2023 38.6  37.0 - 47.0 % Final    MCV 02/27/2023 88.9  80 - 97 fL Final    MCH 02/27/2023 28.1  27.0 - 31.2 pg Final    MCHC 02/27/2023 31.6 (L)  31.8 - 35.4 g/dL Final    RDW RBC Auto-Rto 02/27/2023 14.2  12.5 - 18.0 % Final    Platelets 02/27/2023 199  142 - 424 10*3/uL Final    Neutrophils 02/27/2023 63.2  37 - 80 % Final    Lymphocytes 02/27/2023 21.1 (L)  25 - 40 % Final    Monocytes 02/27/2023 10.2  1 - 15 % Final    Eosinophils 02/27/2023 4.2 (H)  1 - 3 % Final    Basophils 02/27/2023 0.9  0 - 3 % Final    nRBC# 02/27/2023 0.0  % Final    Neutrophils Absolute 02/27/2023 3.60  1.8 - 7.7 10*3/uL Final   Office Visit on 02/08/2023   Component Date Value Ref Range Status    Glucose 02/08/2023 99  74 - 106 mg/dL Final    BUN 02/08/2023 17.6  6 - 20 mg/dL Final    Creatinine 02/08/2023 1.29 (H)  0.50 - 0.90 mg/dL Final    Recommend repeat creatinine within 90 days.    Calcium 02/08/2023 9.5  8.6 - 10.2 mg/dL Final    Sodium 02/08/2023 138  136 - 145 mmol/L Final    Potassium 02/08/2023 4.4  3.5 - 5.1 mmol/L Final    Chloride 02/08/2023 102  98 - 107 mmol/L Final    CO2 02/08/2023 27  22 - 29 mmol/L Final    BUN/Creatinine Ratio 02/08/2023 13.6  6 - 20 Final    GFR ESTIMATION 02/08/2023 49.32 (L)  >60.00 Final    Anion Gap 02/08/2023 9.0  8.0 - 17.0 mmol/L Final    Comment: NOTE  Testing performed at:  [facility], [address] [place] [address]  [address] CLIA #:[id number]      WBC 02/08/2023 9.01  4.3 - 10.8 X 10 3/ul Final    RBC 02/08/2023 4.48  4.2 - 5.4 X 10 6/ul Final    RDW-SD 02/08/2023 46.0  37 - 54 fl Final    Hemoglobin 02/08/2023 12.5  12 - 16 g/dL Final    Hematocrit 02/08/2023 39.3  37 - 47 % Final    MCV 02/08/2023 87.7  82 - 100 fl Final    MCH 02/08/2023 27.9  27  - 32 pg Final    MCHC 02/08/2023 31.8 (L)  32 - 36 g/dL Final    Platelets 02/08/2023 176  135 - 400 X 10 3/ul Final    Neutrophils 02/08/2023 72.4 (H)  34 - 71.1 % Final    Lymphocytes 02/08/2023 17.1 (L)  19.3 - 53.1 % Final    Monocytes 02/08/2023 8.7  4.7 - 12.5 % Final    Eosinophils 02/08/2023 1.2  0.7 - 7.0 % Final    Basophils 02/08/2023 0.2  0.2 - 1.2 % Final    Neutrophils Absolute 02/08/2023 6.52  2.15 - 7.56 X 10 3/ul Final    Lymphocytes Absolute 02/08/2023 1.54  0.86 - 4.75 X 10 3/ul Final    Monocytes Absolute 02/08/2023 0.78  0.22 - 1.08 X 10 3/ul Final    Eosinophils Absolute 02/08/2023 0.11  0.04 - 0.54 X 10 3/ul Final    Basophils Absolute 02/08/2023 0.02  0.00 - 0.22 X 10 3/ul Final    Immature Granulocytes Absolute 02/08/2023 0.04  0 - 0.04 X 10 3/ul Final    Immature Granulocytes 02/08/2023 0.4  0 - 0.5 % Final    IG includes metamyelocytes, myelocytes, and promyelocytes    nRBC# 02/08/2023 0.0  0 - 0.2 /100 WBC Final    nRBC Count Absolute 02/08/2023 0.000  0 - 0.012 x 10 3/ul Final    Comment: NOTE  Testing performed at:  [facility], [address] [place] [address] CLIA #:[id number]      Hemoglobin A1C 02/08/2023 5.8  4.0 - 6.0 % Final    EST AVERAGE GLUCOSE 02/08/2023 120 (H)  NORMAL MG/DL Final    Comment: NOTE  Testing performed at:  [facility], [address] [place] [address]  [address] CLIA #:[id number]      AST 02/08/2023 13  0 - 32 U/L Final    ALT (SGPT) 02/08/2023 8  0 - 33 U/L Final    Alkaline Phosphatase 02/08/2023 124 (H)  35 - 105 U/L Final    Protein, Total 02/08/2023 6.4  6.4 - 8.3 g/dL Final    Albumin 02/08/2023 3.8  3.5 - 5.2 g/dL Final    BILIRUBIN, TOTAL 02/08/2023 0.28  0.00 - 1.20 mg/dL Final    Bilirubin, Direct 02/08/2023 <0.20  0.00 - 0.30 mg/dL Final    Globulin 02/08/2023 2.6  1.5 - 4.5 g/dL Final    Bilirubin, Indirect 02/08/2023 Unable to Calculate  0.10 - 0.80 mg/dL Final    Comment: NOTE  Testing performed at:  [facility],  [address] [place] [address] CLIA #:[id number]      TSH 02/08/2023 1.51  0.27 - 4.20 uIU/mL Final    Comment: NOTE  Testing performed at:  [facility], [address] [place] [address] CLIA #:[id number]      Cholesterol 02/08/2023 217 (H)  100 - 200 mg/dL Final    Comment: Desirable  <200  Borderline 200-240  High risk  >240      Triglycerides 02/08/2023 221 (H)  0 - 150 mg/dL Final    HDL 02/08/2023 56 (L)  >60 mg/dL Final    Comment: <40 mg/dL Major risk factor for CHD  >60 mg/dL Negative risk factor for CHD      LDL Cholesterol 02/08/2023 116.8 (H)  0 - 100 mg/dL Final    LDL/HDL Ratio 02/08/2023 2.1  1 - 3 Final    Comment: NOTE  Testing performed at:  [facility], [address] [place] [address] CLIA #:[id number]      VITAMIN D (25OHD) 02/08/2023 25.3 (L)  >30 ng/mL Final    Comment: Expected values:    Deficient                 <20 ng/mL  Insufficient              21-29 ng/mL  Sufficient                >30 ng/mL  NOTE  Testing performed at:  [facility], [address] [place] [address] CLIA #:[id number]      Uric Acid 02/08/2023 9.0 (H)  2.4 - 5.7 mg/dL Final    Comment: NOTE  Testing performed at:  [facility], [address] [place], [place] [id number] CLIA #:[id number]      SED RATE (WESTERGREN) 02/08/2023 46 (H)  0 - 30 mm/hr Final    Comment: NOTE  Testing performed at:  [facility], [address] [place], [place] [id number] CLIA #:[id number]      RIDDHI 02/08/2023 NEGATIVE   Final    Comment: RIDDHI testing performed using IFA (indirect fluorescent antibody).  NOTE  Testing performed at:  [facility], [address] [place] [address] CLIA #:[id number]      Color, UA 02/08/2023 YELLOW   Final    Clarity, UA 02/08/2023 CLOUDY   Final    Specific Gravity,UA 02/08/2023 1.020  1.005 - 1.030 Final    pH, Urine 02/08/2023 6  5 - 7.5 Final    Leukocytes, UA 02/08/2023 SMALL (A)  NEGATIVE Final    Nitrite,  Urine 02/08/2023 NEGATIVE  NEGATIVE Final    Protein, UA 02/08/2023 500 (H)  NEGATIVE mg/dL Final    Glucose, UA 02/08/2023 NEGATIVE  NEGATIVE mg/dL Final    Ketones, UA 02/08/2023 TRACE (A)  NEGATIVE mg/dL Final    Urobilinogen, urine 02/08/2023 1.0  0 - 1.0 E.U./dL Final    Bilirubin (UA) 02/08/2023 NEGATIVE  NEGATIVE Final    Occult Blood 02/08/2023 NEGATIVE  NEGATIVE Final    WBC/HPF 02/08/2023 5-10 (A)  <5 Final    RBC/HPF 02/08/2023 NEGATIVE  <5 Final    Amorphous, UA 02/08/2023 NEGATIVE   Final    Bacteria, UA 02/08/2023 2+ (A)  NEG-TRACE Final    Epithelial Cells 02/08/2023 MODERATE (A)  NEGATIVE-FEW Final    Mucus, UA 02/08/2023 1+ (A)  NEGATIVE Final    Hyaline Casts, UA 02/08/2023 0-2 PER LPF   Final    Granular Casts, UA 02/08/2023 0-2 PER LPF   Final    Comment: NOTE  Testing performed at:  [facility], [address] [place] [address] CLIA #:[id number]      Urine Culture, Routine 02/08/2023    Final    Comment: Source: URINE  Site:  Organism #1                    ESCHERICHIA COLI  Quantity                      50,000    Antibiotic Susceptibility  Organism #                1  Isolate                   E. Coli  Antibiotic                INT   ALEC  Amikacin                  S     <=16  Amoxicillin/K Clavulanate S     <=8/4  Ampicillin                S     <=8  Ampicillin/sulbactam      S     <=4/2  Aztreonam                 S     <=4  Cefazolin                 S     <=2  Cefepime                  S     <=2  Cefotaxime                S     <=2  Cefoxitin                 S     <=8  Ceftazidime               S     <=1  Ceftazidime/Avibactam     S     <=4  Ceftolozane/Tazobactam    S     <=2  Ceftriaxone               S     <=1  Cefuroxime                S     <=4  Ciprofloxacin             S     <=0.25  Ertapenem                 S     <=0.5  Gentamycin                S     <=2  Imipenem                  S     <=1  Levofloxacin              S     <=0.5  Meropenem/Vaborbactam     S      <=2  Nitrofura                           ntoin            S     <=32  Piperacillin/tazobactam   S     <=8  Tetracycline              S     <=4  Tobramycin                S     <=2  Trimethoprim/sulfa        S     <=0.5/9.5    S=Susceptible   I=Intermediate    R=Resistant    INT=Interpretation   ALEC=Minimum Inhibitory Concentration  NOTE  Testing performed at:  [facility], [address] [place]  [address] CLIA #:[id number]     Patient Outreach on 10/20/2022   Component Date Value Ref Range Status    BCS Recommendation External 09/01/2022 Repeat mammogram in 1 year   Final   Office Visit on 09/27/2022   Component Date Value Ref Range Status    Disclaimer 09/27/2022    Final                    Value:DISCLAIMER: Annual Pap smears are the best means now available for the early detection of cervical cancer.  There is no guarantee, however, that a particular Pap smear will  diseased cells that are present.  Thus, false negative reports may   occasionally occur.           Assessment:     Principle Problem: No primary diagnosis found.   Co-morbidity/Active Problems:   Patient Active Problem List   Diagnosis    Rheumatoid arthritis involving multiple sites    Sjogren's syndrome    Inflammation of sacroiliac joint    Ankylosing spondylitis    Cerebrovascular accident-2011    Essential hypertension    Hyperlipidemia    Osteoarthritis of knee    Carpal tunnel syndrome    Anxiety    Gastroesophageal reflux disease without esophagitis    Allergic rhinitis    Lumbar radiculopathy, chronic    Infiltrating ductal carcinoma of left breast, stage 2    Lung nodule    Stage 3a chronic kidney disease    Severe obesity (BMI 35.0-39.9) with comorbidity    Prediabetes        Cindi Magaña is a 52 y.o. female with PMH of There were no encounter diagnoses., with No primary diagnosis found.     ==============================================  Principal DX:  left segmental mastectomy and axillary node dissection.    Pathological findings shows large area of invasive ductal carcinoma, histological grade 2.  1 positive axillary node for metastasis.   pathological stage IB, histological grade 2, ER +, HI +, HER-2/ind[place] -, and Ki-67 low,left  breast cancer.   OncoType 16  S/p Radiation Oncology     Stage     Metastatic Sites node, ? Lung nodule . Patient reports this was biopsied at [facility] and was found to be non-malignant  Co-morbidities  RA      Plan:   Overall Plan:   Goal of Treatment:  Source of Guideline: NCCN version , Uptodate      1. Left Breast invasive ductal Carcinoma:     08/16/2022: Patient with Stage II breast cancer currently on Arimidex with questionable lung nodule at last visit. She had repeat scan on 6/2/2022 which showed 8mm pulmonary nodule of the lingula with slight interval decrease in  size. There is questionable calcification within the central aspect of this  Nodule.  She had left breast mastectomy, but is due for left breast yearly mammo in September 2022. Patient reports she still has occasional lymphedema to left axilla and arm. She does have a lymphedema sleeve that helps. She wears at night.  Dexa completed 03/02/2022 reviewed with patient which showed normal bone density. She was previously taking otc Calcium+D, however, was changed to rx vitamin D per pcp and is not taking calcium.  I encouraged to continue otc Calcium, may get without added vitamin D if she is already taking rx.   ==02/28/2023: Arimidex well tolerated, mammo reviewed from September 2022 which was [facility].  No masses, lumps, axillary adenopathy or skin changes to breast.  CT reviewed, patient continues to have lung nodule, previously 8mm, now 1mm. Recommended repeat ct scan in 6 months, however declines further imaging of lung lesion.    08/29/2023: Doing well on Arimidex.  Continues to decline repeat ct chest.  No new masses, lumps or skin changes to breast.  She had left breast mastectomy, right breast mammo due September 2023,  will order.  Plan to continue Arimidex    2. Lung Nodule:  ==Improved per March 2022 ct scan  ==Will continue to monitor and repeat ct scan prior to next visit    3. Long term use of Aromatase Inhibitor  ==Tolerated well  ==Dexa completed March 2022, normal bone density    Mammo right breast September 2023  Continue Arimidex  Rtc 6 months cbc cmp      Total time spent in counseling and discussion was more than 60 minutes. I discussed in detail further management options at this time and discussion on relevant tests, imaging and ancillary tests as part of the management plan.

## 2023-08-29 ENCOUNTER — OFFICE VISIT (OUTPATIENT)
Dept: HEMATOLOGY/ONCOLOGY | Facility: CLINIC | Age: 53
End: 2023-08-29
Payer: MEDICARE

## 2023-08-29 ENCOUNTER — PATIENT MESSAGE (OUTPATIENT)
Dept: ADMINISTRATIVE | Facility: HOSPITAL | Age: 53
End: 2023-08-29
Payer: MEDICARE

## 2023-08-29 VITALS
HEART RATE: 57 BPM | HEIGHT: 65 IN | RESPIRATION RATE: 18 BRPM | WEIGHT: 221.31 LBS | SYSTOLIC BLOOD PRESSURE: 157 MMHG | OXYGEN SATURATION: 97 % | DIASTOLIC BLOOD PRESSURE: 97 MMHG | BODY MASS INDEX: 36.87 KG/M2

## 2023-08-29 DIAGNOSIS — Z12.31 ENCOUNTER FOR SCREENING MAMMOGRAM FOR MALIGNANT NEOPLASM OF BREAST: ICD-10-CM

## 2023-08-29 DIAGNOSIS — C50.912 INFILTRATING DUCTAL CARCINOMA OF LEFT BREAST, STAGE 2: Primary | ICD-10-CM

## 2023-08-29 DIAGNOSIS — R91.1 LUNG NODULE: ICD-10-CM

## 2023-08-29 DIAGNOSIS — Z79.811 AROMATASE INHIBITOR USE: ICD-10-CM

## 2023-08-29 PROCEDURE — 3080F DIAST BP >= 90 MM HG: CPT | Mod: CPTII,S$GLB,, | Performed by: NURSE PRACTITIONER

## 2023-08-29 PROCEDURE — 99214 PR OFFICE/OUTPT VISIT, EST, LEVL IV, 30-39 MIN: ICD-10-PCS | Mod: S$GLB,,, | Performed by: NURSE PRACTITIONER

## 2023-08-29 PROCEDURE — 3008F BODY MASS INDEX DOCD: CPT | Mod: CPTII,S$GLB,, | Performed by: NURSE PRACTITIONER

## 2023-08-29 PROCEDURE — 3080F PR MOST RECENT DIASTOLIC BLOOD PRESSURE >= 90 MM HG: ICD-10-PCS | Mod: CPTII,S$GLB,, | Performed by: NURSE PRACTITIONER

## 2023-08-29 PROCEDURE — 3008F PR BODY MASS INDEX (BMI) DOCUMENTED: ICD-10-PCS | Mod: CPTII,S$GLB,, | Performed by: NURSE PRACTITIONER

## 2023-08-29 PROCEDURE — 3044F HG A1C LEVEL LT 7.0%: CPT | Mod: CPTII,S$GLB,, | Performed by: NURSE PRACTITIONER

## 2023-08-29 PROCEDURE — 3077F SYST BP >= 140 MM HG: CPT | Mod: CPTII,S$GLB,, | Performed by: NURSE PRACTITIONER

## 2023-08-29 PROCEDURE — 4010F ACE/ARB THERAPY RXD/TAKEN: CPT | Mod: CPTII,S$GLB,, | Performed by: NURSE PRACTITIONER

## 2023-08-29 PROCEDURE — 99214 OFFICE O/P EST MOD 30 MIN: CPT | Mod: S$GLB,,, | Performed by: NURSE PRACTITIONER

## 2023-08-29 PROCEDURE — 3044F PR MOST RECENT HEMOGLOBIN A1C LEVEL <7.0%: ICD-10-PCS | Mod: CPTII,S$GLB,, | Performed by: NURSE PRACTITIONER

## 2023-08-29 PROCEDURE — 1159F PR MEDICATION LIST DOCUMENTED IN MEDICAL RECORD: ICD-10-PCS | Mod: CPTII,S$GLB,, | Performed by: NURSE PRACTITIONER

## 2023-08-29 PROCEDURE — 1159F MED LIST DOCD IN RCRD: CPT | Mod: CPTII,S$GLB,, | Performed by: NURSE PRACTITIONER

## 2023-08-29 PROCEDURE — 4010F PR ACE/ARB THEARPY RXD/TAKEN: ICD-10-PCS | Mod: CPTII,S$GLB,, | Performed by: NURSE PRACTITIONER

## 2023-08-29 PROCEDURE — 3077F PR MOST RECENT SYSTOLIC BLOOD PRESSURE >= 140 MM HG: ICD-10-PCS | Mod: CPTII,S$GLB,, | Performed by: NURSE PRACTITIONER

## 2023-08-30 ENCOUNTER — PATIENT OUTREACH (OUTPATIENT)
Dept: ADMINISTRATIVE | Facility: HOSPITAL | Age: 53
End: 2023-08-30
Payer: MEDICARE

## 2023-08-30 DIAGNOSIS — Z12.31 BREAST CANCER SCREENING BY MAMMOGRAM: Primary | ICD-10-CM

## 2023-08-30 NOTE — PROGRESS NOTES
Replying to Campaign Questionnaire for Overdue HM:  Mammogram    Mammogram order sent to Central Scheduling.

## 2023-09-27 ENCOUNTER — OFFICE VISIT (OUTPATIENT)
Dept: OBSTETRICS AND GYNECOLOGY | Facility: CLINIC | Age: 53
End: 2023-09-27
Payer: MEDICARE

## 2023-09-27 VITALS
HEART RATE: 88 BPM | SYSTOLIC BLOOD PRESSURE: 102 MMHG | BODY MASS INDEX: 37.11 KG/M2 | WEIGHT: 223 LBS | DIASTOLIC BLOOD PRESSURE: 67 MMHG

## 2023-09-27 DIAGNOSIS — Z00.00 WELL WOMAN EXAM (NO GYNECOLOGICAL EXAM): Primary | ICD-10-CM

## 2023-09-27 DIAGNOSIS — Z90.12 HISTORY OF MASTECTOMY, LEFT: ICD-10-CM

## 2023-09-27 DIAGNOSIS — Z85.3 HISTORY OF BREAST CANCER: ICD-10-CM

## 2023-09-27 PROCEDURE — 3074F PR MOST RECENT SYSTOLIC BLOOD PRESSURE < 130 MM HG: ICD-10-PCS | Mod: CPTII,S$GLB,, | Performed by: NURSE PRACTITIONER

## 2023-09-27 PROCEDURE — 1160F PR REVIEW ALL MEDS BY PRESCRIBER/CLIN PHARMACIST DOCUMENTED: ICD-10-PCS | Mod: CPTII,S$GLB,, | Performed by: NURSE PRACTITIONER

## 2023-09-27 PROCEDURE — 1159F MED LIST DOCD IN RCRD: CPT | Mod: CPTII,S$GLB,, | Performed by: NURSE PRACTITIONER

## 2023-09-27 PROCEDURE — 3008F PR BODY MASS INDEX (BMI) DOCUMENTED: ICD-10-PCS | Mod: CPTII,S$GLB,, | Performed by: NURSE PRACTITIONER

## 2023-09-27 PROCEDURE — 4010F PR ACE/ARB THEARPY RXD/TAKEN: ICD-10-PCS | Mod: CPTII,S$GLB,, | Performed by: NURSE PRACTITIONER

## 2023-09-27 PROCEDURE — 3008F BODY MASS INDEX DOCD: CPT | Mod: CPTII,S$GLB,, | Performed by: NURSE PRACTITIONER

## 2023-09-27 PROCEDURE — 3078F PR MOST RECENT DIASTOLIC BLOOD PRESSURE < 80 MM HG: ICD-10-PCS | Mod: CPTII,S$GLB,, | Performed by: NURSE PRACTITIONER

## 2023-09-27 PROCEDURE — 3044F PR MOST RECENT HEMOGLOBIN A1C LEVEL <7.0%: ICD-10-PCS | Mod: CPTII,S$GLB,, | Performed by: NURSE PRACTITIONER

## 2023-09-27 PROCEDURE — 4010F ACE/ARB THERAPY RXD/TAKEN: CPT | Mod: CPTII,S$GLB,, | Performed by: NURSE PRACTITIONER

## 2023-09-27 PROCEDURE — 3074F SYST BP LT 130 MM HG: CPT | Mod: CPTII,S$GLB,, | Performed by: NURSE PRACTITIONER

## 2023-09-27 PROCEDURE — 3078F DIAST BP <80 MM HG: CPT | Mod: CPTII,S$GLB,, | Performed by: NURSE PRACTITIONER

## 2023-09-27 PROCEDURE — G0101 CA SCREEN;PELVIC/BREAST EXAM: HCPCS | Mod: S$GLB,,, | Performed by: NURSE PRACTITIONER

## 2023-09-27 PROCEDURE — 3044F HG A1C LEVEL LT 7.0%: CPT | Mod: CPTII,S$GLB,, | Performed by: NURSE PRACTITIONER

## 2023-09-27 PROCEDURE — 1159F PR MEDICATION LIST DOCUMENTED IN MEDICAL RECORD: ICD-10-PCS | Mod: CPTII,S$GLB,, | Performed by: NURSE PRACTITIONER

## 2023-09-27 PROCEDURE — G0101 PR CA SCREEN;PELVIC/BREAST EXAM: ICD-10-PCS | Mod: S$GLB,,, | Performed by: NURSE PRACTITIONER

## 2023-09-27 PROCEDURE — 1160F RVW MEDS BY RX/DR IN RCRD: CPT | Mod: CPTII,S$GLB,, | Performed by: NURSE PRACTITIONER

## 2023-09-27 RX ORDER — AMITRIPTYLINE HYDROCHLORIDE 25 MG/1
TABLET, FILM COATED ORAL
COMMUNITY

## 2023-09-27 RX ORDER — AMLODIPINE AND BENAZEPRIL HYDROCHLORIDE 5; 10 MG/1; MG/1
CAPSULE ORAL
COMMUNITY
End: 2023-09-27

## 2023-09-27 NOTE — PROGRESS NOTES
Subjective:       Patient ID: Cindi Magaña is a 53 y.o. female.    Chief Complaint:  Well Woman      History of Present Illness   Presents for annual gyn exam. History and past labs reviewed with patient.    Complains of musculoskeletal pain sees Dr Pham for management.  History of Left Breast Invasive ductal carcinoma, grade 1, ER 93%, MD 75%, HER-2 negative (score 0), Ki-67 8%, and ductal carcinoma in situ, grade 1, cribriform type with no necrosis and focally associated fine needed microcalcifications, very focal Infiltrating duct carcinoma of upper inner quadrant of left female breast==10/18/2019 Initial Diagnosis-followed by oncology   PCP- Dr Miles  Not sexually active  No hx of abnormal paps     OB History          2    Para   2    Term   1       1    AB        Living   1         SAB        IAB        Ectopic        Multiple        Live Births   1                  Review of Systems  Review of Systems   Constitutional:  Negative for chills and fever.   Respiratory:  Negative for shortness of breath.    Cardiovascular:  Negative for chest pain.   Gastrointestinal:  Negative for abdominal pain, blood in stool, constipation, diarrhea, nausea, vomiting and reflux.   Genitourinary:  Negative for dysmenorrhea, dyspareunia, dysuria, hematuria, hot flashes, menorrhagia, menstrual problem, pelvic pain, vaginal bleeding, vaginal discharge, postcoital bleeding and vaginal dryness.   Musculoskeletal:  Positive for back pain and leg pain. Negative for arthralgias and joint swelling.   Integumentary:  Negative for rash, hair changes, breast mass, nipple discharge and breast skin changes.   Psychiatric/Behavioral:  Negative for depression. The patient is not nervous/anxious.    Breast: Negative for asymmetry, lump, mass, nipple discharge and skin changes          Objective:     Vitals:    23 0756   BP: 102/67   Pulse: 88   Weight: 101.2 kg (223 lb)        Physical Exam:   Constitutional: She  is oriented to person, place, and time. She appears well-developed and well-nourished.    HENT:   Head: Normocephalic and atraumatic.    Eyes: Pupils are equal, round, and reactive to light. Conjunctivae are normal.    Neck: No thyromegaly present.    Cardiovascular:  Normal rate, regular rhythm and normal heart sounds.             Pulmonary/Chest: Effort normal and breath sounds normal. No respiratory distress. Right breast exhibits no inverted nipple, no mass, no nipple discharge, no skin change, no swelling and no lumpectomy. Left breast exhibits absence.        Abdominal: Soft. There is no abdominal tenderness.     Genitourinary:    Genitourinary Comments: Declines pelvic exam              Musculoskeletal: Normal range of motion and moves all extremeties.       Neurological: She is alert and oriented to person, place, and time. She has normal reflexes.    Skin: Skin is warm and dry.    Psychiatric: She has a normal mood and affect. Her behavior is normal. Judgment and thought content normal.        Pap- 2022- Negative with negative HPV  Assessment:     1. Well woman exam (no gynecological exam)    2. History of mastectomy, left    3. History of breast cancer              Plan:       Well woman exam (no gynecological exam)    History of mastectomy, left  -     BREAST PROSTHESIS FOR HOME USE    History of breast cancer       MMG order by oncology- number to scheduling given  Declines std panel  Patient was counseled today on A.C.S. Pap guidelines and recommendations for yearly pelvic exams, mammograms and monthly self breast exams; to see her PCP for other health maintenance.   Healthy diet, lifestyle, and exercise   OTC Women's multi vitamin  Encouraged to take vit D and feso4  Encourage to make appt for back pain   Pap due 2025  Follow up in about 1 year (around 9/27/2024).

## 2023-11-07 LAB — BCS RECOMMENDATION EXT: NORMAL

## 2023-12-18 ENCOUNTER — PATIENT OUTREACH (OUTPATIENT)
Dept: ADMINISTRATIVE | Facility: HOSPITAL | Age: 53
End: 2023-12-18
Payer: MEDICARE

## 2024-01-10 DIAGNOSIS — I10 ESSENTIAL HYPERTENSION: ICD-10-CM

## 2024-01-10 RX ORDER — BENAZEPRIL HYDROCHLORIDE 10 MG/1
10 TABLET ORAL DAILY
Qty: 90 TABLET | Refills: 0 | Status: SHIPPED | OUTPATIENT
Start: 2024-01-10

## 2024-01-11 DIAGNOSIS — C50.912 INFILTRATING DUCTAL CARCINOMA OF LEFT BREAST, STAGE 2: ICD-10-CM

## 2024-01-11 RX ORDER — ANASTROZOLE 1 MG/1
1 TABLET ORAL
Qty: 90 TABLET | Refills: 4 | Status: SHIPPED | OUTPATIENT
Start: 2024-01-11

## 2024-01-23 DIAGNOSIS — F41.9 ANXIETY: ICD-10-CM

## 2024-01-23 DIAGNOSIS — M10.9 GOUT, UNSPECIFIED CAUSE, UNSPECIFIED CHRONICITY, UNSPECIFIED SITE: ICD-10-CM

## 2024-01-23 RX ORDER — ALLOPURINOL 300 MG/1
300 TABLET ORAL DAILY
Qty: 90 TABLET | Refills: 0 | Status: SHIPPED | OUTPATIENT
Start: 2024-01-23 | End: 2024-04-04 | Stop reason: SDUPTHER

## 2024-01-23 RX ORDER — VENLAFAXINE HYDROCHLORIDE 75 MG/1
CAPSULE, EXTENDED RELEASE ORAL
Qty: 90 CAPSULE | Refills: 0 | Status: SHIPPED | OUTPATIENT
Start: 2024-01-23 | End: 2024-04-04 | Stop reason: SDUPTHER

## 2024-01-23 NOTE — TELEPHONE ENCOUNTER
----- Message from Johanna Luna sent at 1/22/2024 11:04 AM CST -----  Contact: Patient  Patient called to consult with nurse or staff regarding her medication. She states that the pharmacy has requested refills but states it keeps getting rejected. She wanted to check on the status of this and would like a call back. She can be reached at [phone]. Thanks/MR

## 2024-02-22 ENCOUNTER — TELEPHONE (OUTPATIENT)
Dept: FAMILY MEDICINE | Facility: CLINIC | Age: 54
End: 2024-02-22
Payer: MEDICARE

## 2024-02-22 DIAGNOSIS — K21.9 GASTROESOPHAGEAL REFLUX DISEASE WITHOUT ESOPHAGITIS: ICD-10-CM

## 2024-02-22 RX ORDER — PANTOPRAZOLE SODIUM 40 MG/1
TABLET, DELAYED RELEASE ORAL
Qty: 90 TABLET | Refills: 0 | Status: SHIPPED | OUTPATIENT
Start: 2024-02-22 | End: 2024-04-04 | Stop reason: SDUPTHER

## 2024-04-04 ENCOUNTER — OFFICE VISIT (OUTPATIENT)
Dept: FAMILY MEDICINE | Facility: CLINIC | Age: 54
End: 2024-04-04
Payer: MEDICARE

## 2024-04-04 VITALS
OXYGEN SATURATION: 99 % | BODY MASS INDEX: 37.45 KG/M2 | WEIGHT: 224.81 LBS | SYSTOLIC BLOOD PRESSURE: 136 MMHG | TEMPERATURE: 96 F | RESPIRATION RATE: 15 BRPM | DIASTOLIC BLOOD PRESSURE: 84 MMHG | HEIGHT: 65 IN | HEART RATE: 66 BPM

## 2024-04-04 DIAGNOSIS — M10.9 GOUT, UNSPECIFIED CAUSE, UNSPECIFIED CHRONICITY, UNSPECIFIED SITE: ICD-10-CM

## 2024-04-04 DIAGNOSIS — E66.01 SEVERE OBESITY (BMI 35.0-39.9) WITH COMORBIDITY: ICD-10-CM

## 2024-04-04 DIAGNOSIS — E55.9 VITAMIN D INSUFFICIENCY: ICD-10-CM

## 2024-04-04 DIAGNOSIS — M06.9 RHEUMATOID ARTHRITIS INVOLVING MULTIPLE SITES, UNSPECIFIED WHETHER RHEUMATOID FACTOR PRESENT: ICD-10-CM

## 2024-04-04 DIAGNOSIS — N18.31 STAGE 3A CHRONIC KIDNEY DISEASE: ICD-10-CM

## 2024-04-04 DIAGNOSIS — M35.00 SJOGREN'S SYNDROME, WITH UNSPECIFIED ORGAN INVOLVEMENT: ICD-10-CM

## 2024-04-04 DIAGNOSIS — I10 ESSENTIAL HYPERTENSION: Primary | ICD-10-CM

## 2024-04-04 DIAGNOSIS — K21.9 GASTROESOPHAGEAL REFLUX DISEASE WITHOUT ESOPHAGITIS: ICD-10-CM

## 2024-04-04 DIAGNOSIS — R73.03 PREDIABETES: ICD-10-CM

## 2024-04-04 DIAGNOSIS — F41.9 ANXIETY: ICD-10-CM

## 2024-04-04 DIAGNOSIS — E78.2 MIXED HYPERLIPIDEMIA: ICD-10-CM

## 2024-04-04 LAB
25(OH)D3 SERPL-MCNC: 19 NG/ML
ALBUMIN SERPL BCP-MCNC: 3.2 G/DL (ref 3.4–5)
ALP SERPL-CCNC: 145 U/L (ref 45–117)
ALT SERPL W P-5'-P-CCNC: 20 U/L (ref 13–56)
ANION GAP SERPL CALC-SCNC: 3 MMOL/L (ref 3–11)
AST SERPL-CCNC: 17 U/L (ref 15–37)
BILIRUB SERPL-MCNC: 0.3 MG/DL (ref 0.2–1)
BUN SERPL-MCNC: 16 MG/DL (ref 7–18)
BUN/CREAT SERPL: 12.69 RATIO
CALCIUM SERPL-MCNC: 9.8 MG/DL (ref 8.5–10.1)
CHLORIDE SERPL-SCNC: 108 MMOL/L (ref 98–107)
CO2 SERPL-SCNC: 26 MMOL/L (ref 21–32)
CREAT SERPL-MCNC: 1.26 MG/DL (ref 0.55–1.02)
ESTIMATED AVG GLUCOSE: 136 MG/DL
GFR ESTIMATION: 53
GLUCOSE SERPL-MCNC: 105 MG/DL (ref 74–106)
HBA1C MFR BLD: 6 % (ref 4.2–6.3)
POTASSIUM SERPL-SCNC: 4.8 MMOL/L (ref 3.5–5.1)
PROT SERPL-MCNC: 7.2 G/DL (ref 6.4–8.2)
SODIUM BLD-SCNC: 137 MMOL/L (ref 131–143)

## 2024-04-04 PROCEDURE — 3079F DIAST BP 80-89 MM HG: CPT | Mod: CPTII,S$GLB,, | Performed by: INTERNAL MEDICINE

## 2024-04-04 PROCEDURE — 3075F SYST BP GE 130 - 139MM HG: CPT | Mod: CPTII,S$GLB,, | Performed by: INTERNAL MEDICINE

## 2024-04-04 PROCEDURE — 99215 OFFICE O/P EST HI 40 MIN: CPT | Mod: S$GLB,,, | Performed by: INTERNAL MEDICINE

## 2024-04-04 PROCEDURE — 1160F RVW MEDS BY RX/DR IN RCRD: CPT | Mod: CPTII,S$GLB,, | Performed by: INTERNAL MEDICINE

## 2024-04-04 PROCEDURE — 3008F BODY MASS INDEX DOCD: CPT | Mod: CPTII,S$GLB,, | Performed by: INTERNAL MEDICINE

## 2024-04-04 PROCEDURE — 1159F MED LIST DOCD IN RCRD: CPT | Mod: CPTII,S$GLB,, | Performed by: INTERNAL MEDICINE

## 2024-04-04 PROCEDURE — 4010F ACE/ARB THERAPY RXD/TAKEN: CPT | Mod: CPTII,S$GLB,, | Performed by: INTERNAL MEDICINE

## 2024-04-04 RX ORDER — GABAPENTIN 600 MG/1
600 TABLET ORAL 2 TIMES DAILY
COMMUNITY
Start: 2024-02-29

## 2024-04-04 RX ORDER — VENLAFAXINE HYDROCHLORIDE 75 MG/1
CAPSULE, EXTENDED RELEASE ORAL
Qty: 90 CAPSULE | Refills: 1 | Status: SHIPPED | OUTPATIENT
Start: 2024-04-04

## 2024-04-04 RX ORDER — CARVEDILOL 25 MG/1
25 TABLET ORAL 2 TIMES DAILY WITH MEALS
Qty: 180 TABLET | Refills: 1 | Status: SHIPPED | OUTPATIENT
Start: 2024-04-04

## 2024-04-04 RX ORDER — PANTOPRAZOLE SODIUM 40 MG/1
TABLET, DELAYED RELEASE ORAL
Qty: 90 TABLET | Refills: 1 | Status: SHIPPED | OUTPATIENT
Start: 2024-04-04 | End: 2024-05-20

## 2024-04-04 RX ORDER — ALLOPURINOL 300 MG/1
300 TABLET ORAL DAILY
Qty: 90 TABLET | Refills: 1 | Status: SHIPPED | OUTPATIENT
Start: 2024-04-04

## 2024-04-04 RX ORDER — BENAZEPRIL HYDROCHLORIDE 10 MG/1
10 TABLET ORAL DAILY
Qty: 90 TABLET | Refills: 1 | Status: SHIPPED | OUTPATIENT
Start: 2024-04-04

## 2024-04-04 NOTE — PROGRESS NOTES
Subjective:       Patient ID: Cindi Magaña is a 53 y.o. female.    Chief Complaint: Follow-up (Pt. Here for 6mth f/u.  No c/o today.)    HPI    53 y.o. female with Active Diagnosis Review (HCC)     Chronic             HCC 12 - Breast, Prostate, and Other Cancers and Tumors     Infiltrating ductal carcinoma of left breast, stage 2 [C50.912]     HCC 22 - Morbid Obesity     Severe obesity (BMI 35.0-39.9) with comorbidity [E66.01]     HCC 40 - Rheumatoid Arthritis and Inflammatory Connective Tissue Disease       Sjogren's syndrome, with unspecified organ involvement [M35.00]     Rheumatoid arthritis involving multiple sites [M06.9]     Sjogren's syndrome [M35.00]     Inflammation of sacroiliac joint [M46.1]     Ankylosing spondylitis [M45.9]      - Chronic Kidney Disease, Moderate (Stage 3)     Stage 3a chronic kidney disease [N18.31]           presents for follow up of chronic medical problems:     HTN: benazepril 10mg daily, coreg 25mg bid    Gout: allopurinol 300mg daily. Doing well on med.    CKD3a: follows with nephro Dr. Boucher q 6 months- seen a few weeks ago. Labs completed prior to that apt showed GFR 31 on 3/18/24    HLD: pravastatin 80mg    preDM: lifestyle managed    GERD: pantoprazole 40 mg    Vit D insuff: taking otc vit d supplement- unsure dose    Rheumatologist: Dr. Pham - follows once monthly    Review of Systems   Constitutional:  Negative for fever.   Eyes:  Negative for visual disturbance.   Respiratory:  Negative for shortness of breath.    Cardiovascular:  Negative for chest pain.   Gastrointestinal:  Negative for vomiting.   Genitourinary:  Negative for decreased urine volume and difficulty urinating.   Musculoskeletal:  Positive for arthralgias.   Neurological:  Negative for syncope.   Psychiatric/Behavioral:  The patient is nervous/anxious.        Objective:        Vitals:    04/04/24 1021   BP: 136/84   BP Location: Right arm   Patient Position: Sitting   BP Method: Large  "(Manual)   Pulse: 66   Resp: 15   Temp: 96.4 °F (35.8 °C)   TempSrc: Temporal   SpO2: 99%   Weight: 102 kg (224 lb 12.8 oz)   Height: 5' 5" (1.651 m)       Body mass index is 37.41 kg/m².    Physical Exam  Constitutional:       General: She is not in acute distress.     Appearance: She is well-developed. She is not diaphoretic.   HENT:      Head: Normocephalic and atraumatic.      Right Ear: External ear normal.      Left Ear: External ear normal.   Eyes:      Conjunctiva/sclera: Conjunctivae normal.   Cardiovascular:      Rate and Rhythm: Normal rate and regular rhythm.   Pulmonary:      Effort: Pulmonary effort is normal.      Breath sounds: Normal breath sounds.   Abdominal:      General: There is no distension.   Musculoskeletal:      Cervical back: Neck supple.      Right lower leg: No edema.      Left lower leg: No edema.   Skin:     General: Skin is warm and dry.      Nails: There is no clubbing.   Neurological:      General: No focal deficit present.      Mental Status: She is alert. Mental status is at baseline.   Psychiatric:         Behavior: Behavior normal.         Judgment: Judgment normal.         Assessment:     1. Essential hypertension    2. Mixed hyperlipidemia    3. Stage 3a chronic kidney disease    4. Prediabetes    5. Gastroesophageal reflux disease without esophagitis    6. Vitamin D insufficiency    7. Gout, unspecified cause, unspecified chronicity, unspecified site    8. Anxiety    9. Severe obesity (BMI 35.0-39.9) with comorbidity    10. Rheumatoid arthritis involving multiple sites, unspecified whether rheumatoid factor present    11. Sjogren's syndrome, with unspecified organ involvement           Plan:         1. Essential hypertension    - Comprehensive Metabolic Panel; Future  - Comprehensive Metabolic Panel  - benazepriL (LOTENSIN) 10 MG tablet; Take 1 tablet (10 mg total) by mouth once daily.  Dispense: 90 tablet; Refill: 1  - carvediloL (COREG) 25 MG tablet; Take 1 tablet (25 mg " total) by mouth 2 (two) times daily with meals.  Dispense: 180 tablet; Refill: 1    2. Mixed hyperlipidemia  - continue pravastatin 80mg daily    3. Stage 3a chronic kidney disease  - progressed to CKD3b based on outside labs by nephrologist w/ GFR 31 on 3/18 - will upload results to media section in chart. Repeat labs today to assess trend- renally dose meds pending results. Continue oral hydration, avoiding nsaids and f/u with nephrologist per treatment plan  - Comprehensive Metabolic Panel; Future  - Comprehensive Metabolic Panel    4. Prediabetes  - lifestyle managed  - Hemoglobin A1C; Future  - Hemoglobin A1C    5. Gastroesophageal reflux disease without esophagitis    - pantoprazole (PROTONIX) 40 MG tablet; TK 1 T PO QD  Dispense: 90 tablet; Refill: 1    6. Vitamin D insufficiency  - cont otc supplement  - Vitamin D    7. Gout, unspecified cause, unspecified chronicity, unspecified site  - pending labs today for GFR may need to renally dose allopurinol  - allopurinoL (ZYLOPRIM) 300 MG tablet; Take 1 tablet (300 mg total) by mouth once daily.  Dispense: 90 tablet; Refill: 1    8. Anxiety    - venlafaxine (EFFEXOR-XR) 75 MG 24 hr capsule; TK 1 C PO QD  Dispense: 90 capsule; Refill: 1    9. Severe obesity (BMI 35.0-39.9) with comorbidity  - chronic, stable. encourage weight loss through healthy lifestyle including diet and exercise      10. Rheumatoid arthritis involving multiple sites, unspecified whether rheumatoid factor present  - follows w/ Rheumatologist Dr. Pham monthly- med management per treating provider    11. Sjogren's syndrome, with unspecified organ involvement  - follows w/ Rheumatologist Dr. Pham monthly- med management per treating provider    Outside labs reviewed.  Update labs in clinic today.    Unless there are intervening problems, Follow up in about 4 months (around 8/4/2024), or if symptoms worsen or fail to improve, for annual.      Patient note was created using MModal Dictation.   Any errors in syntax or even information may not have been identified and edited on initial review prior to signing this note.    I spent a total of 40 minutes on the day of the visit.  This includes face to face time and non-face to face time preparing to see the patient (eg, review of tests), obtaining and/or reviewing separately obtained history, documenting clinical information in the electronic or other health record, independently interpreting results and communicating results to the patient/family/caregiver, or care coordinator.

## 2024-04-04 NOTE — PROGRESS NOTES
Results released to patient portal.      Your results have been reviewed.  Kidney function stable in CKD3a range (improved from GFR of 31 with nephrologist a few weeks ago).   A1c stable in prediabetes range.   Vitamin D still pending.   Please call if you have any questions or concerns.    Sincerely,   Dr. Miles

## 2024-05-09 DIAGNOSIS — C50.912 INFILTRATING DUCTAL CARCINOMA OF LEFT BREAST, STAGE 2: Primary | ICD-10-CM

## 2024-05-09 LAB
ALBUMIN SERPL BCP-MCNC: 3.2 G/DL (ref 3.4–5)
ALBUMIN/GLOBULIN RATIO: 0.86 RATIO (ref 1.1–1.8)
ALP SERPL-CCNC: 157 U/L (ref 46–116)
ALT SERPL W P-5'-P-CCNC: 23 U/L (ref 12–78)
ANION GAP SERPL CALC-SCNC: 9 MMOL/L (ref 3–11)
AST SERPL-CCNC: 27 U/L (ref 15–37)
BASOPHILS NFR BLD: 0.5 % (ref 0–3)
BILIRUB SERPL-MCNC: 0.3 MG/DL (ref 0–1)
BUN SERPL-MCNC: 32 MG/DL (ref 7–18)
BUN/CREAT SERPL: 22.53 RATIO (ref 7–18)
CALCIUM SERPL-MCNC: 8.8 MG/DL (ref 8.8–10.5)
CHLORIDE SERPL-SCNC: 107 MMOL/L (ref 100–108)
CO2 SERPL-SCNC: 25 MMOL/L (ref 21–32)
CREAT SERPL-MCNC: 1.42 MG/DL (ref 0.55–1.02)
EOSINOPHIL NFR BLD: 2.1 % (ref 1–3)
ERYTHROCYTE [DISTWIDTH] IN BLOOD BY AUTOMATED COUNT: 16.4 % (ref 12.5–18)
GFR ESTIMATION: 47
GLOBULIN: 3.7 G/DL (ref 2.3–3.5)
GLUCOSE SERPL-MCNC: 81 MG/DL (ref 70–110)
HCT VFR BLD AUTO: 38.5 % (ref 37–47)
HGB BLD-MCNC: 11.9 G/DL (ref 12–16)
HYPOCHROMIA BLD QL SMEAR: NORMAL
LYMPHOCYTES NFR BLD: 24.5 % (ref 25–40)
MCH RBC QN AUTO: 28.8 PG (ref 27–31.2)
MCHC RBC AUTO-ENTMCNC: 30.9 G/DL (ref 31.8–35.4)
MCV RBC AUTO: 93.2 FL (ref 80–97)
MONOCYTES NFR BLD: 11.4 % (ref 1–15)
NEUTROPHILS # BLD AUTO: 4.01 10*3/UL (ref 1.8–7.7)
NEUTROPHILS NFR BLD: 61 % (ref 37–80)
NUCLEATED RED BLOOD CELLS: 0 %
PLATELETS: 213 10*3/UL (ref 142–424)
POTASSIUM SERPL-SCNC: 4.6 MMOL/L (ref 3.6–5.2)
PROT SERPL-MCNC: 6.9 G/DL (ref 6.4–8.2)
RBC # BLD AUTO: 4.13 10*6/UL (ref 4.2–5.4)
SODIUM BLD-SCNC: 141 MMOL/L (ref 135–145)
WBC # BLD: 6.6 10*3/UL (ref 4.6–10.2)

## 2024-05-13 ENCOUNTER — OFFICE VISIT (OUTPATIENT)
Dept: HEMATOLOGY/ONCOLOGY | Facility: CLINIC | Age: 54
End: 2024-05-13
Payer: MEDICARE

## 2024-05-13 VITALS
DIASTOLIC BLOOD PRESSURE: 74 MMHG | BODY MASS INDEX: 37.49 KG/M2 | HEIGHT: 65 IN | SYSTOLIC BLOOD PRESSURE: 109 MMHG | RESPIRATION RATE: 18 BRPM | OXYGEN SATURATION: 95 % | HEART RATE: 71 BPM | WEIGHT: 225 LBS

## 2024-05-13 DIAGNOSIS — R91.1 SOLITARY PULMONARY NODULE: ICD-10-CM

## 2024-05-13 DIAGNOSIS — Z12.31 ENCOUNTER FOR SCREENING MAMMOGRAM FOR MALIGNANT NEOPLASM OF BREAST: ICD-10-CM

## 2024-05-13 DIAGNOSIS — R91.1 LUNG NODULE: ICD-10-CM

## 2024-05-13 DIAGNOSIS — Z79.811 LONG TERM (CURRENT) USE OF AROMATASE INHIBITORS: ICD-10-CM

## 2024-05-13 DIAGNOSIS — C50.912 INFILTRATING DUCTAL CARCINOMA OF LEFT BREAST, STAGE 2: Primary | ICD-10-CM

## 2024-05-13 PROCEDURE — 4010F ACE/ARB THERAPY RXD/TAKEN: CPT | Mod: CPTII,S$GLB,, | Performed by: NURSE PRACTITIONER

## 2024-05-13 PROCEDURE — 3078F DIAST BP <80 MM HG: CPT | Mod: CPTII,S$GLB,, | Performed by: NURSE PRACTITIONER

## 2024-05-13 PROCEDURE — 3074F SYST BP LT 130 MM HG: CPT | Mod: CPTII,S$GLB,, | Performed by: NURSE PRACTITIONER

## 2024-05-13 PROCEDURE — 99214 OFFICE O/P EST MOD 30 MIN: CPT | Mod: S$GLB,,, | Performed by: NURSE PRACTITIONER

## 2024-05-13 PROCEDURE — 1159F MED LIST DOCD IN RCRD: CPT | Mod: CPTII,S$GLB,, | Performed by: NURSE PRACTITIONER

## 2024-05-13 PROCEDURE — 3008F BODY MASS INDEX DOCD: CPT | Mod: CPTII,S$GLB,, | Performed by: NURSE PRACTITIONER

## 2024-05-13 PROCEDURE — 3044F HG A1C LEVEL LT 7.0%: CPT | Mod: CPTII,S$GLB,, | Performed by: NURSE PRACTITIONER

## 2024-05-13 RX ORDER — ANASTROZOLE 1 MG/1
1 TABLET ORAL DAILY
Qty: 90 TABLET | Refills: 4 | Status: SHIPPED | OUTPATIENT
Start: 2024-05-13

## 2024-05-13 NOTE — PROGRESS NOTES
Medical Oncology Progress Note  [facility]     PATIENT: Cindi Magaña  : 1970 53 y.o. female  MRN [id number]     PCP: Dalila Miles MD  Consult Requested By:      Date of Service: 2024    Subjective:   Interim History:    Infiltrating ductal carcinoma of left breast, stage 2    Cindi Magaña is here for f/up  Visit.    The patient has surgery and radiation therapy to the left breast at [facility] in 2020 without complications..  The patient here for follow-up visit and she has chronic pain she is on Arimidex 1 milligram p.o. daily.  She was previously being followed by Dr. Homero Vega then Dr. Diego 2022.    Oncology History:    Left Breast Invasive ductal carcinoma, grade 1, ER 93%, DE 75%, HER-2 negative (score 0), Ki-67 8%, and ductal carcinoma in situ, grade 1, cribriform type with no necrosis and focally associated fine needed microcalcifications, very focal     Infiltrating duct carcinoma of upper inner quadrant of left female breast     ==10/18/2019 Initial Diagnosis       10/18/19 Screening Mmg: -new focal area of architectural distortion located near the 10 o'clock position of the middle left breast that is worrisome for malignancy.  No definite associated mass or suspicious calcification.    Left Breast US:   ==10:00 zone 2 position irregular hypoechoic shadowing mass correlates with mammographic abnormality and measures 1×1.4×1.4 cm.     ==2019 Biopsy   [facility] Patholo  Invasive ductal carcinoma, grade 1, ER 93%, DE 75%, HER-2 negative (score 0), Ki-67 8%, and ductal carcinoma in situ, grade 1, cribriform type with no necrosis      [facility] Biopsies  Left Breast 9:00 core bx with Bard coil-shaped clip placement: INVASIVE DUCTAL CARCINOMA WITH FOCAL NEUROENDOCRINE DIFFERENTIATION, INTERMEDIATE NUCLEAR GRADE, JAYDON HISTOLOGIC GRADE 1, ER/DE 95, HER2 negative (0)  Left axillary lymph node FNA with [place] U-shaped clip  placement: metastatic adenocarcinoma     ==1/30/2020 Lung Bx MDASPECIMEN SOURCEA. Lung, left upper lobe, FNA  No malignant cells identified  Rare bronchial epithelial cells present    ==3/2/2020 Staging form: Breast, AJCC 8th Edition  - Pathologic stage from 3/2/2020: Stage IB (pT3, pN1a, cM0, G2, ER+, IA+, HER2-) - Signed by Serafin Schneider MD on 3/3/2020  3/2020:   ==Oncotype DX recurrence score is 16 with distant recurrence risk at 9 years estimated to be 15% with hormonal therapy alone. There is no apparent benefit from chemotherapy. She will proceed with hormonal therapy alone with anastrozole 1 mg PO daily.   ==CT left lung nodule; per patient, this is old finding  ==08/06/2022: CT chest 8mm pulmonary nodule of the lingula with slight interval decrease in   size. There is questionable calcification within the central aspect of this    nodule.     Oncology History    No history exists.       Past Medical History:   Past Medical History:   Diagnosis Date    Breast cancer     Cancer     Heart disease     Hyperlipidemia     Hypertension     Kidney disease     Obesity     Rheumatoid arthritis involving multiple sites 2/27/2019    Stroke     Stroke        Past Surgical HIstory:   Past Surgical History:   Procedure Laterality Date    MASTECTOMY         Allergies:  Review of patient's allergies indicates:  No Known Allergies    Medications:  Current Outpatient Medications   Medication Sig Dispense Refill    allopurinoL (ZYLOPRIM) 300 MG tablet Take 1 tablet (300 mg total) by mouth once daily. 90 tablet 1    anastrozole (ARIMIDEX) 1 mg Tab TAKE 1 TABLET(1 MG) BY MOUTH EVERY DAY 90 tablet 4    aspirin (ECOTRIN) 81 MG EC tablet Aspir-81 81 mg tablet,delayed release   Take 1 tablet every day by oral route.      benazepriL (LOTENSIN) 10 MG tablet Take 1 tablet (10 mg total) by mouth once daily. 90 tablet 1    carvediloL (COREG) 25 MG tablet Take 1 tablet (25 mg total) by mouth 2 (two) times daily with meals. 180 tablet 1     fluticasone propionate (FLONASE) 50 mcg/actuation nasal spray SHAKE LQ AND U 1 SPR IEN BID 18.2 mL 6    folic acid (FOLVITE) 1 MG tablet folic acid 1 mg tablet   TK 2 TS PO QD      furosemide (LASIX) 20 MG tablet furosemide 20 mg tablet      gabapentin (NEURONTIN) 600 MG tablet Take 600 mg by mouth 2 (two) times daily.      HYDROcodone-acetaminophen (NORCO)  mg per tablet Take 1 tablet by mouth.      mupirocin (BACTROBAN) 2 % ointment Apply topically 3 (three) times daily as needed (wound). 30 g 2    pantoprazole (PROTONIX) 40 MG tablet TK 1 T PO QD 90 tablet 1    pravastatin (PRAVACHOL) 80 MG tablet Take 1 tablet (80 mg total) by mouth every evening. 90 tablet 3    tiZANidine (ZANAFLEX) 4 MG tablet Take 4 mg by mouth 3 (three) times daily.      venlafaxine (EFFEXOR-XR) 75 MG 24 hr capsule TK 1 C PO QD 90 capsule 1     No current facility-administered medications for this visit.       Family History:   Family History   Problem Relation Name Age of Onset    Hypertension Mother      Hypertension Father      Hypertension Sister      Lupus Sister      Hypertension Brother         Social History:  reports that she has quit smoking. Her smoking use included cigarettes. She has never been exposed to tobacco smoke. She has never used smokeless tobacco. She reports that she does not currently use alcohol. She reports that she does not use drugs.    Review of Systemas    Constitutional: No change in weight, appetie, fatigue, fever, or night sweats  Eyes: No changes in vision  Ears, Nose, Mouth, Throat, and Face: No hearing problems, ear pain, rummy nose, or sore throat  Respiratory: No shortness of breath or cough  Cardiovascular: No chest pain, palpitations or dizziness  Gastrointestinal: No abdominal pain, hematochezia, melena  Genitourinary: No dysuria, hematuria, nocturia, menstrual problems, post menopausal  Integumentary/Breast: No rashes or itching  Hematologic/Lymphatic: No anemia or bleeding  "abnormalities  Musculoskeletal: No joint or muscle abnormalities  Neurological: No sensory or motor problems, no headaches  Behavioral/Psych: No depression, anxiety, cognitive problems, or stress  Endocrine: No diabetes or thyroid problem  Allergic/Immunologic: See allergy above    Physical Exam      Vitals:   Vitals:    05/13/24 0811   Resp: 18   Weight: 102.1 kg (225 lb)   Height: 5' 5" (1.651 m)     BMI: Body mass index is 37.44 kg/m².  BSA Body surface area is 2.16 meters squared.  ECOG Performance Status:   ECOG SCORE           GENERAL APPEARANCE: Well developed, well nourished, in no acute distress.  SKIN: Inspection of the skin reveals no rashes, ulcerations or petechiae.  HEENT: The sclerae were anicteric and conjunctivae were pink and moist. Extraocular movements were intact and pupils were equal, round, and reactive to light with normal accommodation. External inspection of the ears and nose showed no scars, lesions, or masses. Lips, teeth, and gums showed normal mucosa. The oral mucosa, hard and soft palate, tongue and posterior pharynx were normal.  NECK: Supple and symmetric. There was no thyroid enlargement, and no tenderness, or masses were felt.  CRESPIRATORY: Normal AP diameter and normal contour without any kyphoscoliosis. LUNGS: Auscultation of the lungs revealed normal breath sounds without any other adventitious sounds or rubs.  CARDIOVASCULAR: There was a regular rate and rhythm without any murmurs, gallops, rubs. The carotid pulses were normal and 2+ bilaterally without bruits. Peripheral pulses were 2+ and symmetric.  GASTROINTESTINAL: Soft and nontender with normal bowel sounds. The liver span was approximately 5-6 cm in the right midclavicular line by percussion. The liver edge was nontender. The spleen was not palpable. There were no inguinal or umbilical hernias noted. No ascites was noted.  LYMPH NODES: No lymphadenopathy was appreciated in the neck, axillae or groin.   MUSCULOSKELETAL: " Gait was normal. There was no tenderness or effusions noted. Muscle strength and tone were normal. EXTREMITIES: No cyanosis, clubbing. +ve for Lymphedema in left upper extremitie  NEUROLOGIC: Alert and oriented x 3. Normal affect. Gait was normal. Normal deep tendon reflexes with no pathological reflexes. Sensation to touch was normal.  PSYCHIATRIC: good mood, orientated to place, time and person     Labs and Imagings     Orders Only on 05/09/2024   Component Date Value Ref Range Status    WBC 05/09/2024 6.6  4.6 - 10.2 10*3/uL Final    RBC 05/09/2024 4.13 (L)  4.2 - 5.4 10*6/uL Final    Hemoglobin 05/09/2024 11.9 (L)  12.0 - 16.0 g/dL Final    Hematocrit 05/09/2024 38.5  37.0 - 47.0 % Final    MCV 05/09/2024 93.2  80 - 97 fL Final    MCH 05/09/2024 28.8  27.0 - 31.2 pg Final    MCHC 05/09/2024 30.9 (L)  31.8 - 35.4 g/dL Final    RDW RBC Auto-Rto 05/09/2024 16.4  12.5 - 18.0 % Final    Platelets 05/09/2024 213  142 - 424 10*3/uL Final    Neutrophils 05/09/2024 61.0  37 - 80 % Final    Lymphocytes 05/09/2024 24.5 (L)  25 - 40 % Final    Monocytes 05/09/2024 11.4  1 - 15 % Final    Eosinophils 05/09/2024 2.1  1 - 3 % Final    Basophils 05/09/2024 0.5  0 - 3 % Final    nRBC# 05/09/2024 0.0  % Final    Neutrophils Absolute 05/09/2024 4.01  1.8 - 7.7 10*3/uL Final    Sodium 05/09/2024 141  135 - 145 mmol/L Final    Potassium 05/09/2024 4.6  3.6 - 5.2 mmol/L Final    Chloride 05/09/2024 107  100 - 108 mmol/L Final    CO2 05/09/2024 25  21 - 32 mmol/L Final    Anion Gap 05/09/2024 9.0  3.0 - 11.0 mmol/L Final    BUN 05/09/2024 32 (H)  7 - 18 mg/dL Final    Creatinine 05/09/2024 1.42 (H)  0.55 - 1.02 mg/dL Final    GFR ESTIMATION 05/09/2024 47 (L)  >60 Final               DESCRIPTION       GLOMERULAR FILTRATION RATE             NORMAL                     >60             KIDNEY  DISEASE           15-60             KIDNEY FAILURE             <15    BUN/Creatinine Ratio 05/09/2024 22.53 (H)  7 - 18 Ratio Final    Glucose  05/09/2024 81  70 - 110 mg/dL Final    Calcium 05/09/2024 8.8  8.8 - 10.5 mg/dL Final    Total Bilirubin 05/09/2024 0.3  0.0 - 1.0 mg/dL Final    AST 05/09/2024 27  15 - 37 U/L Final    ALT 05/09/2024 23  12 - 78 U/L Final    Total Protein 05/09/2024 6.9  6.4 - 8.2 g/dL Final    Albumin 05/09/2024 3.2 (L)  3.4 - 5.0 g/dL Final    Globulin 05/09/2024 3.7 (H)  2.3 - 3.5 g/dL Final    Albumin/Globulin Ratio 05/09/2024 0.864 (L)  1.1 - 1.8 Ratio Final    Alkaline Phosphatase 05/09/2024 157 (H)  46 - 116 U/L Final    Hypochromia 05/09/2024 1+   Final   Office Visit on 04/04/2024   Component Date Value Ref Range Status    Vit D, 25-Hydroxy 04/04/2024 19 (L)  >30 ng/mL Final    Comment: Vitamin D deficiency has been defined by the [place] ofMedicine and an Endocrine Society practice guideline as alevel of serum 25-OH vitamin D less than 20 ng/mL. TheEndocrine Society further defined vitamin D insufficiency asa level between 21 and 29   ng/mL.      Estimated Avg Glucose 04/04/2024 136  mg/dL Final    Hemoglobin A1C 04/04/2024 6.0  4.2 - 6.3 % Final    Comment: Ranges for Hemoglobin A1C in diabetic patients:Hemoglobin A1C          Degree of glucose control      <7.0%             Well-controlled      >8.0%             Poorly-controlledDegrees of glucose control based on goals set by theAmerican Diabetes   Association.      Sodium 04/04/2024 137  131 - 143 mmol/L Final    Potassium 04/04/2024 4.8  3.5 - 5.1 mmol/L Final    Chloride 04/04/2024 108 (H)  98 - 107 mmol/L Final    CO2 04/04/2024 26  21 - 32 mmol/L Final    Anion Gap 04/04/2024 3.0  3.0 - 11.0 mmol/L Final    BUN 04/04/2024 16.0  7.0 - 18.0 mg/dL Final    Creatinine 04/04/2024 1.26 (H)  0.55 - 1.02 mg/dL Final    GFR ESTIMATION 04/04/2024 53 (L)  >60 Final    BUN/Creatinine Ratio 04/04/2024 12.69  Ratio Final    Glucose 04/04/2024 105  74 - 106 mg/dL Final    Calcium 04/04/2024 9.8  8.5 - 10.1 mg/dL Final    Total Bilirubin 04/04/2024 0.3  0.2 - 1.0 mg/dL Final     AST 04/04/2024 17  15 - 37 U/L Final    ALT 04/04/2024 20  13 - 56 U/L Final    Total Protein 04/04/2024 7.2  6.4 - 8.2 g/dL Final    Albumin 04/04/2024 3.2 (L)  3.4 - 5.0 g/dL Final    Alkaline Phosphatase 04/04/2024 145 (H)  45 - 117 U/L Final   Patient Outreach on 12/18/2023   Component Date Value Ref Range Status    BCS Recommendation External 11/07/2023 Repeat mammogram in 1 year   Final   Orders Only on 08/24/2023   Component Date Value Ref Range Status    WBC 08/25/2023 8.2  4.6 - 10.2 10*3/uL Final    RBC 08/25/2023 3.82 (L)  4.2 - 5.4 10*6/uL Final    Hemoglobin 08/25/2023 11.0 (L)  12.0 - 16.0 g/dL Final    Hematocrit 08/25/2023 34.4 (L)  37.0 - 47.0 % Final    MCV 08/25/2023 90.1  80 - 97 fL Final    MCH 08/25/2023 28.8  27.0 - 31.2 pg Final    MCHC 08/25/2023 32.0  31.8 - 35.4 g/dL Final    RDW RBC Auto-Rto 08/25/2023 14.9  12.5 - 18.0 % Final    Platelets 08/25/2023 195  142 - 424 10*3/uL Final    Neutrophils 08/25/2023 62.1  37 - 80 % Final    Lymphocytes 08/25/2023 27.0  25 - 40 % Final    Monocytes 08/25/2023 8.3  1 - 15 % Final    Eosinophils 08/25/2023 1.5  1 - 3 % Final    Basophils 08/25/2023 0.5  0 - 3 % Final    nRBC# 08/25/2023 0.0  % Final    Neutrophils Absolute 08/25/2023 5.10  1.8 - 7.7 10*3/uL Final    Sodium 08/25/2023 137  135 - 145 mmol/L Final    Potassium 08/25/2023 4.3  3.6 - 5.2 mmol/L Final    Chloride 08/25/2023 101  100 - 108 mmol/L Final    CO2 08/25/2023 29  21 - 32 mmol/L Final    Anion Gap 08/25/2023 7.0  3.0 - 11.0 mmol/L Final    BUN 08/25/2023 17  7 - 18 mg/dL Final    Creatinine 08/25/2023 1.19 (H)  0.55 - 1.02 mg/dL Final    GFR ESTIMATION 08/25/2023 57 (L)  >60 Final               DESCRIPTION       GLOMERULAR FILTRATION RATE             NORMAL                     >60             KIDNEY  DISEASE           15-60             KIDNEY FAILURE             <15    BUN/Creatinine Ratio 08/25/2023 14.28  7 - 18 Ratio Final    Glucose 08/25/2023 101  70 - 110 mg/dL Final     Calcium 08/25/2023 8.8  8.8 - 10.5 mg/dL Final    Total Bilirubin 08/25/2023 0.3  0.0 - 1.0 mg/dL Final    AST 08/25/2023 21  15 - 37 U/L Final    ALT 08/25/2023 11 (L)  12 - 78 U/L Final    Total Protein 08/25/2023 6.5  6.4 - 8.2 g/dL Final    Albumin 08/25/2023 3.2 (L)  3.4 - 5.0 g/dL Final    Globulin 08/25/2023 3.3  2.3 - 3.5 g/dL Final    Albumin/Globulin Ratio 08/25/2023 0.969 (L)  1.1 - 1.8 Ratio Final    Alkaline Phosphatase 08/25/2023 139 (H)  46 - 116 U/L Final   Office Visit on 08/08/2023   Component Date Value Ref Range Status    WBC 08/08/2023 6.0  4.5 - 10.0 10*3/uL Final    RBC 08/08/2023 3.98 (L)  4.20 - 5.40 10*6/uL Final    Hemoglobin 08/08/2023 11.2 (L)  12.0 - 16.0 g/dL Final    Hematocrit 08/08/2023 36.6 (L)  37.0 - 47.0 % Final    MCV 08/08/2023 92.0  80.0 - 99.0 fL Final    MCH 08/08/2023 28.1  27.0 - 32.0 pg Final    MCHC 08/08/2023 30.6 (L)  32.0 - 36.0 % Final    RDW RBC Auto-Rto 08/08/2023 14.7  0.0 - 15.5 % Final    Platelets 08/08/2023 202  130 - 400 10*3/uL Final    MPV 08/08/2023 12.9 (H)  9.2 - 12.2 fL Final    Neutrophils 08/08/2023 65.0  50 - 80 % Final    Immature Granulocytes 08/08/2023 0.30  0.0 - 0.43 % Final    Lymphocytes 08/08/2023 25.9  20.0 - 45.0 % Final    Monocytes 08/08/2023 7.0  2 - 10 % Final    Eosinophils 08/08/2023 1.5  0 - 6 % Final    Basophils 08/08/2023 0.3  0 - 3 % Final    nRBC# 08/08/2023 0.0  0 - 0.2 % Final    Neutrophils Absolute 08/08/2023 3.9  1.4 - 7.0 10*3/uL Final    Immature Granulocytes Absolute 08/08/2023 0.0200  0.0 - 0.0310 thou/uL Final    Lymphocytes Absolute 08/08/2023 1.6  1.2 - 4.0 10*3/uL Final    Monocytes Absolute 08/08/2023 0.4  0.1 - 0.8 10*3/uL Final    Eosinophils Absolute 08/08/2023 0.1  0.0 - 0.6 10*3/uL Final    Basophils Absolute 08/08/2023 0.0  0.0 - 0.3 10*3/uL Final    nRBC Count Absolute 08/08/2023 0.000  0 - 0.012 thou/uL Final    Sodium 08/08/2023 139  131 - 143 mmol/L Final    Potassium 08/08/2023 4.3  3.5 - 5.1 mmol/L  Final    Chloride 08/08/2023 109 (H)  98 - 107 mmol/L Final    CO2 08/08/2023 24  21 - 32 mmol/L Final    Anion Gap 08/08/2023 6.0  3.0 - 11.0 mmol/L Final    BUN 08/08/2023 22.0 (H)  7.0 - 18.0 mg/dL Final    Creatinine 08/08/2023 1.28 (H)  0.55 - 1.02 mg/dL Final    GFR ESTIMATION 08/08/2023 53 (L)  >60 Final    BUN/Creatinine Ratio 08/08/2023 17.18  Ratio Final    Glucose 08/08/2023 107 (H)  74 - 106 mg/dL Final    Calcium 08/08/2023 8.9  8.5 - 10.1 mg/dL Final    Total Bilirubin 08/08/2023 0.3  0.2 - 1.0 mg/dL Final    AST 08/08/2023 16  15 - 37 U/L Final    ALT 08/08/2023 16  13 - 56 U/L Final    Total Protein 08/08/2023 6.9  6.4 - 8.2 g/dL Final    Albumin 08/08/2023 3.1 (L)  3.4 - 5.0 g/dL Final    Alkaline Phosphatase 08/08/2023 128 (H)  45 - 117 U/L Final    Estimated Avg Glucose 08/08/2023 129  mg/dL Final    Hemoglobin A1C 08/08/2023 5.8  4.2 - 6.3 % Final    Comment: Ranges for Hemoglobin A1C in diabetic patients:Hemoglobin A1C          Degree of glucose control      <7.0%             Well-controlled      >8.0%             Poorly-controlledDegrees of glucose control based on goals set by theAmerican Diabetes   Association.      Cholesterol 08/08/2023 196  <200 mg/dL Final    Comment: The National Cholesterol Education Program has publishedreference Cholesterol values for cardiovascular risk to be:            Low risk................<200 mg/dL          Borderline risk.........201 - 239 mg/dL        High risk...............240 mg/dl   and greater      Triglycerides 08/08/2023 179 (H)  0 - 149 mg/dL Final    Ranges:Normal...............<150 mg/dLBorderline Risk......150-199 mg/dLHigh.................200-499 mg/dLVery High............>500 mg/dL    HDL 08/08/2023 54  >39 mg/dL Final    Comment: The National Cholesterol Education Program Adult TreatmentPanel III (NCEP-ATP III) provides the followingclassifications of HDL-C concentrations:HDL cholesterol  < 40 mg/dL  Low HDL cholesterolHDL cholesterol >= 60  mg/dL  High HDL cholesterolThe   following guidelines apply to HDL-Cholesterol: Good prognosis   Standard Risk   Risk Indicator   >60 mg/dL       40-59 mg/dL     <40 mg/dL      LDL Cholesterol 08/08/2023 106.2  mg/dL Final    Reference Range / Risk Factor Levels:        Optimal:   < 100   mg/dL  Above Optimal:   100-129 mg/dLBorderline High:   130-159 mg/dL           High:   160-189 mg/dL      Very High:   > 190   mg/dL    VLDL 08/08/2023 36  mg/dL Final    Vit D, 25-Hydroxy 08/08/2023 21 (L)  >30 ng/mL Final    Comment: Vitamin D deficiency has been defined by the [place] ofMedicine and an Endocrine Society practice guideline as alevel of serum 25-OH vitamin D less than 20 ng/mL. TheEndocrine Society further defined vitamin D insufficiency asa level between 21 and 29   ng/mL.      Free T4 08/08/2023 1.04  0.76 - 1.46 ng/dL Final    TSH 08/08/2023 1.870  0.358 - 3.74 uIU/mL Final         Assessment:     Principle Problem: No primary diagnosis found.   Co-morbidity/Active Problems:   Patient Active Problem List   Diagnosis    Rheumatoid arthritis involving multiple sites    Sjogren's syndrome    Inflammation of sacroiliac joint    Ankylosing spondylitis    Cerebrovascular accident-2011    Essential hypertension    Hyperlipidemia    Osteoarthritis of knee    Carpal tunnel syndrome    Anxiety    Gastroesophageal reflux disease without esophagitis    Allergic rhinitis    Lumbar radiculopathy, chronic    Infiltrating ductal carcinoma of left breast, stage 2    Lung nodule    Stage 3a chronic kidney disease    Severe obesity (BMI 35.0-39.9) with comorbidity    Prediabetes    Gout    Vitamin D insufficiency        Cindi Magaña is a 53 y.o. female with PMH of There were no encounter diagnoses., with No primary diagnosis found.     ==============================================  Principal DX:  left segmental mastectomy and axillary node dissection.   Pathological findings shows large area of invasive ductal  carcinoma, histological grade 2.  1 positive axillary node for metastasis.   pathological stage IB, histological grade 2, ER +, LA +, HER-2/[place] -, and Ki-67 low,left  breast cancer.   OncoType 16  S/p Radiation Oncology     Stage     Metastatic Sites node, ? Lung nodule . Patient reports this was biopsied at [facility] and was found to be non-malignant  Co-morbidities  RA      Plan:   Overall Plan:   Goal of Treatment:  Source of Guideline: NCCN version , Uptodate      1. Left Breast invasive ductal Carcinoma:     08/16/2022: Patient with Stage II breast cancer currently on Arimidex with questionable lung nodule at last visit. She had repeat scan on 6/2/2022 which showed 8mm pulmonary nodule of the lingula with slight interval decrease in  size. There is questionable calcification within the central aspect of this  Nodule.  She had left breast mastectomy, but is due for left breast yearly mammo in September 2022. Patient reports she still has occasional lymphedema to left axilla and arm. She does have a lymphedema sleeve that helps. She wears at night.  Dexa completed 03/02/2022 reviewed with patient which showed normal bone density. She was previously taking otc Calcium+D, however, was changed to rx vitamin D per pcp and is not taking calcium.  I encouraged to continue otc Calcium, may get without added vitamin D if she is already taking rx.   ==02/28/2023: Arimidex well tolerated, mammo reviewed from September 2022 which was [facility].  No masses, lumps, axillary adenopathy or skin changes to breast.  CT reviewed, patient continues to have lung nodule, previously 8mm, now 1mm. Recommended repeat ct scan in 6 months, however declines further imaging of lung lesion.    08/29/2023: Doing well on Arimidex.  Continues to decline repeat ct chest.  No new masses, lumps or skin changes to breast.  She had left breast mastectomy, right breast mammo due September 2023, will order.  Plan to continue Arimidex  ==05/13/2024:  Tolerating Arimidex well, bone density due, last was march 2022.  She sees Dr. Pham for RA, not on any DMARDs for RA. Also has lung nodule per previous ct scans which has been stable, is amendable to repeat ct scan to monitor, will order. SrCr elvated at this [facility], she was a little dehydrated, has been wnl, recently saw Dr. Brice stated was stable, will recheck before ct chest.  Mammo due November. No changes to breast, no skin changes, masses, adenopathy.    2. Lung Nodule:  ==Improved per March 2022 ct scan  ==Will continue to monitor and repeat ct scan prior to next visit    3. Long term use of Aromatase Inhibitor  ==Tolerated well  ==Dexa completed March 2022, normal bone density      To Do:  Bone density due - last march 2022, pt on AI  Rtc 6 months cbc [facility]  Mammo due november      Total time spent in counseling and discussion was more than 30 minutes. I discussed in detail further management options at this time and discussion on relevant tests, imaging and ancillary tests as part of the management plan.

## 2024-05-18 DIAGNOSIS — K21.9 GASTROESOPHAGEAL REFLUX DISEASE WITHOUT ESOPHAGITIS: ICD-10-CM

## 2024-05-20 RX ORDER — PANTOPRAZOLE SODIUM 40 MG/1
TABLET, DELAYED RELEASE ORAL
Qty: 90 TABLET | Refills: 1 | Status: SHIPPED | OUTPATIENT
Start: 2024-05-20

## 2024-08-07 ENCOUNTER — OFFICE VISIT (OUTPATIENT)
Dept: FAMILY MEDICINE | Facility: CLINIC | Age: 54
End: 2024-08-07
Payer: MEDICARE

## 2024-08-07 VITALS
BODY MASS INDEX: 36.76 KG/M2 | RESPIRATION RATE: 18 BRPM | HEIGHT: 65 IN | OXYGEN SATURATION: 94 % | WEIGHT: 220.63 LBS | TEMPERATURE: 98 F | HEART RATE: 83 BPM | DIASTOLIC BLOOD PRESSURE: 72 MMHG | SYSTOLIC BLOOD PRESSURE: 116 MMHG

## 2024-08-07 DIAGNOSIS — N18.31 STAGE 3A CHRONIC KIDNEY DISEASE: ICD-10-CM

## 2024-08-07 DIAGNOSIS — R73.03 PREDIABETES: ICD-10-CM

## 2024-08-07 DIAGNOSIS — K21.9 GASTROESOPHAGEAL REFLUX DISEASE WITHOUT ESOPHAGITIS: ICD-10-CM

## 2024-08-07 DIAGNOSIS — E66.01 CLASS 2 SEVERE OBESITY DUE TO EXCESS CALORIES WITH SERIOUS COMORBIDITY AND BODY MASS INDEX (BMI) OF 36.0 TO 36.9 IN ADULT: ICD-10-CM

## 2024-08-07 DIAGNOSIS — Z00.00 ANNUAL PHYSICAL EXAM: Primary | ICD-10-CM

## 2024-08-07 DIAGNOSIS — E78.2 MIXED HYPERLIPIDEMIA: ICD-10-CM

## 2024-08-07 DIAGNOSIS — E55.9 VITAMIN D INSUFFICIENCY: ICD-10-CM

## 2024-08-07 DIAGNOSIS — M10.9 GOUT, UNSPECIFIED CAUSE, UNSPECIFIED CHRONICITY, UNSPECIFIED SITE: ICD-10-CM

## 2024-08-07 DIAGNOSIS — I10 ESSENTIAL HYPERTENSION: ICD-10-CM

## 2024-08-07 PROBLEM — E66.812 CLASS 2 SEVERE OBESITY DUE TO EXCESS CALORIES WITH SERIOUS COMORBIDITY AND BODY MASS INDEX (BMI) OF 36.0 TO 36.9 IN ADULT: Status: ACTIVE | Noted: 2022-01-27

## 2024-08-07 PROCEDURE — G2211 COMPLEX E/M VISIT ADD ON: HCPCS | Mod: ,,, | Performed by: INTERNAL MEDICINE

## 2024-08-07 PROCEDURE — 3008F BODY MASS INDEX DOCD: CPT | Mod: CPTII,,, | Performed by: INTERNAL MEDICINE

## 2024-08-07 PROCEDURE — 1159F MED LIST DOCD IN RCRD: CPT | Mod: CPTII,,, | Performed by: INTERNAL MEDICINE

## 2024-08-07 PROCEDURE — 4010F ACE/ARB THERAPY RXD/TAKEN: CPT | Mod: CPTII,,, | Performed by: INTERNAL MEDICINE

## 2024-08-07 PROCEDURE — 3074F SYST BP LT 130 MM HG: CPT | Mod: CPTII,,, | Performed by: INTERNAL MEDICINE

## 2024-08-07 PROCEDURE — 99215 OFFICE O/P EST HI 40 MIN: CPT | Mod: ,,, | Performed by: INTERNAL MEDICINE

## 2024-08-07 PROCEDURE — 1160F RVW MEDS BY RX/DR IN RCRD: CPT | Mod: CPTII,,, | Performed by: INTERNAL MEDICINE

## 2024-08-07 PROCEDURE — 3078F DIAST BP <80 MM HG: CPT | Mod: CPTII,,, | Performed by: INTERNAL MEDICINE

## 2024-08-07 PROCEDURE — 3044F HG A1C LEVEL LT 7.0%: CPT | Mod: CPTII,,, | Performed by: INTERNAL MEDICINE

## 2024-08-07 RX ORDER — PANTOPRAZOLE SODIUM 40 MG/1
TABLET, DELAYED RELEASE ORAL
Start: 2024-08-07

## 2024-08-07 RX ORDER — PRAVASTATIN SODIUM 80 MG/1
80 TABLET ORAL NIGHTLY
Qty: 90 TABLET | Refills: 3 | Status: SHIPPED | OUTPATIENT
Start: 2024-08-07

## 2024-08-07 RX ORDER — CARVEDILOL 25 MG/1
25 TABLET ORAL 2 TIMES DAILY WITH MEALS
Start: 2024-08-07

## 2024-08-07 RX ORDER — BENAZEPRIL HYDROCHLORIDE 10 MG/1
10 TABLET ORAL DAILY
Start: 2024-08-07

## 2024-08-07 RX ORDER — ALLOPURINOL 300 MG/1
300 TABLET ORAL DAILY
Start: 2024-08-07

## 2024-09-13 ENCOUNTER — PATIENT MESSAGE (OUTPATIENT)
Dept: PRIMARY CARE CLINIC | Facility: CLINIC | Age: 54
End: 2024-09-13
Payer: MEDICARE

## 2024-09-25 LAB
25(OH)D3 SERPL-MCNC: 29 NG/ML
ABS NRBC COUNT: 0 THOU/UL (ref 0–0.01)
ABSOLUTE BASOPHIL: 0 10*3/UL (ref 0–0.3)
ABSOLUTE EOSINOPHIL: 0.2 10*3/UL (ref 0–0.6)
ABSOLUTE IMMATURE GRAN: 0.02 THOU/UL (ref 0–0.03)
ABSOLUTE LYMPHOCYTE: 1.6 10*3/UL (ref 1.2–4)
ABSOLUTE MONOCYTE: 0.4 10*3/UL (ref 0.1–0.8)
ALBUMIN SERPL BCP-MCNC: 3.5 G/DL (ref 3.4–5)
ALBUMIN/GLOBULIN RATIO: 0.92 RATIO (ref 1.1–1.8)
ALP SERPL-CCNC: 141 U/L (ref 45–117)
ALT SERPL W P-5'-P-CCNC: 17 U/L (ref 13–56)
ANION GAP SERPL CALC-SCNC: 7 MMOL/L (ref 3–11)
AST SERPL-CCNC: 19 U/L (ref 15–37)
BASOPHILS NFR BLD: 0.5 % (ref 0–3)
BILIRUB SERPL-MCNC: 0.3 MG/DL (ref 0.2–1)
BUN SERPL-MCNC: 29 MG/DL (ref 7–18)
BUN/CREAT SERPL: 9.79 RATIO
CALCIUM SERPL-MCNC: 9.3 MG/DL (ref 8.5–10.1)
CHLORIDE SERPL-SCNC: 105 MMOL/L (ref 98–107)
CO2 SERPL-SCNC: 22 MMOL/L (ref 21–32)
CREAT SERPL-MCNC: 2.96 MG/DL (ref 0.55–1.02)
CREATININE, URINE: 421.5 MG/DL (ref 10–175)
EOSINOPHIL NFR BLD: 2.7 % (ref 0–6)
ERYTHROCYTE [DISTWIDTH] IN BLOOD BY AUTOMATED COUNT: 14.6 % (ref 0–15.5)
ESTIMATED AVG GLUCOSE: 140 MG/DL
GFR ESTIMATION: 20
GLOBULIN: 3.8 G/DL (ref 2.3–3.5)
GLUCOSE SERPL-MCNC: 98 MG/DL (ref 74–106)
HBA1C MFR BLD: 6.1 % (ref 4.2–6.3)
HCT VFR BLD AUTO: 37.4 % (ref 37–47)
HGB BLD-MCNC: 11.4 G/DL (ref 12–16)
IMMATURE GRANULOCYTES: 0.3 % (ref 0–0.43)
LYMPHOCYTES NFR BLD: 25.2 % (ref 20–45)
MCH RBC QN AUTO: 28.6 PG (ref 27–32)
MCHC RBC AUTO-ENTMCNC: 30.5 % (ref 32–36)
MCV RBC AUTO: 94 FL (ref 80–99)
MICROALBUMIN URINE: 233.5 MG/L (ref 0–20)
MICROALBUMIN/CREATININE RATIO: 55 MG/G (ref 0–30)
MONOCYTES NFR BLD: 7.1 % (ref 2–10)
NEUTROPHILS # BLD AUTO: 4 10*3/UL (ref 1.4–7)
NEUTROPHILS NFR BLD: 64.2 % (ref 50–80)
NUCLEATED RED BLOOD CELLS: 0 % (ref 0–0.2)
PLATELETS: 207 10*3/UL (ref 130–400)
PMV BLD AUTO: 11.5 FL (ref 9.2–12.2)
POTASSIUM SERPL-SCNC: 4.5 MMOL/L (ref 3.5–5.1)
PROT SERPL-MCNC: 7.3 G/DL (ref 6.4–8.2)
RBC # BLD AUTO: 3.98 10*6/UL (ref 4.2–5.4)
SODIUM BLD-SCNC: 134 MMOL/L (ref 131–143)
T4 FREE SP9 P CHAL SERPL-SCNC: 1.03 NG/DL (ref 0.76–1.46)
WBC # BLD: 6.2 10*3/UL (ref 4.5–10)

## 2024-09-26 DIAGNOSIS — M10.9 GOUT, UNSPECIFIED CAUSE, UNSPECIFIED CHRONICITY, UNSPECIFIED SITE: ICD-10-CM

## 2024-09-26 DIAGNOSIS — N17.9 ACUTE RENAL FAILURE, UNSPECIFIED ACUTE RENAL FAILURE TYPE: Primary | ICD-10-CM

## 2024-09-26 DIAGNOSIS — I10 ESSENTIAL HYPERTENSION: ICD-10-CM

## 2024-09-26 DIAGNOSIS — N17.9 AKI (ACUTE KIDNEY INJURY): ICD-10-CM

## 2024-09-26 DIAGNOSIS — F41.9 ANXIETY: ICD-10-CM

## 2024-09-26 RX ORDER — CARVEDILOL 25 MG/1
25 TABLET ORAL 2 TIMES DAILY WITH MEALS
Qty: 180 TABLET | Refills: 0 | Status: SHIPPED | OUTPATIENT
Start: 2024-09-26

## 2024-09-26 RX ORDER — ALLOPURINOL 300 MG/1
TABLET ORAL
Qty: 90 TABLET | Refills: 0 | Status: SHIPPED | OUTPATIENT
Start: 2024-09-26

## 2024-09-26 RX ORDER — VENLAFAXINE HYDROCHLORIDE 75 MG/1
CAPSULE, EXTENDED RELEASE ORAL
Qty: 90 CAPSULE | Refills: 1 | Status: SHIPPED | OUTPATIENT
Start: 2024-09-26

## 2024-09-26 NOTE — TELEPHONE ENCOUNTER
Called pt to discuss lab results. ELVI w/ eGFR 20. Pt denies nsaid use, states she just saw nephrology recently who put her on unknown abx because she is feeling bad. Still feels bad. Recommend she go to ED for eval and tx of ELVI. Pt expresses understanding and plans to go today.   Other labs stable/pending.

## 2024-09-28 DIAGNOSIS — I10 ESSENTIAL HYPERTENSION: ICD-10-CM

## 2024-10-01 ENCOUNTER — OFFICE VISIT (OUTPATIENT)
Dept: OBSTETRICS AND GYNECOLOGY | Facility: CLINIC | Age: 54
End: 2024-10-01
Payer: MEDICARE

## 2024-10-01 VITALS
DIASTOLIC BLOOD PRESSURE: 75 MMHG | HEIGHT: 65 IN | WEIGHT: 217.38 LBS | HEART RATE: 71 BPM | SYSTOLIC BLOOD PRESSURE: 127 MMHG | BODY MASS INDEX: 36.22 KG/M2

## 2024-10-01 DIAGNOSIS — Z85.3 HISTORY OF BREAST CANCER: ICD-10-CM

## 2024-10-01 DIAGNOSIS — Z01.419 WELL WOMAN EXAM WITH ROUTINE GYNECOLOGICAL EXAM: Primary | ICD-10-CM

## 2024-10-01 DIAGNOSIS — Z90.12 HISTORY OF MASTECTOMY, LEFT: ICD-10-CM

## 2024-10-01 RX ORDER — BENAZEPRIL HYDROCHLORIDE 10 MG/1
10 TABLET ORAL
Qty: 90 TABLET | Refills: 1 | Status: SHIPPED | OUTPATIENT
Start: 2024-10-01

## 2024-10-01 NOTE — PROGRESS NOTES
"    Subjective:       Patient ID: Cindi Magaña is a 54 y.o. female.    Chief Complaint:  Well Woman      History of Present Illness   Presents for annual gyn exam. History and past labs reviewed with patient.    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   Respiratory:  Negative for shortness of breath.    Cardiovascular:  Negative for chest pain.   Gastrointestinal:  Negative for abdominal pain, blood in stool, constipation, diarrhea, nausea, vomiting and reflux.   Genitourinary:  Negative for dysmenorrhea, dyspareunia, dysuria, hematuria, hot flashes, menorrhagia, menstrual problem, pelvic pain, vaginal bleeding, vaginal discharge, postcoital bleeding and vaginal dryness.   Musculoskeletal:  Negative for arthralgias and joint swelling.   Integumentary:  Negative for rash, hair changes, breast mass, nipple discharge and breast skin changes.   Psychiatric/Behavioral:  Negative for depression. The patient is not nervous/anxious.    Breast: Negative for asymmetry, lump, mass, nipple discharge and skin changes          Objective:     Vitals:    10/01/24 0854   BP: 127/75   Pulse: 71   Weight: 98.6 kg (217 lb 6 oz)   Height: 5' 5" (1.651 m)        Physical Exam:   Constitutional: She is oriented to person, place, and time. She appears well-developed and well-nourished.    HENT:   Head: Normocephalic and atraumatic.    Eyes: Pupils are equal, round, and reactive to light. Conjunctivae are normal.    Neck: No thyromegaly present.    Cardiovascular:  Normal rate, regular rhythm and normal heart sounds.             Pulmonary/Chest: Effort normal and breath sounds " normal. No respiratory distress. Right breast exhibits no inverted nipple, no mass, no nipple discharge, no skin change and no swelling. Left breast exhibits absence.        Abdominal: Soft.     Genitourinary:    Vagina and uterus normal.   Rectum:      Guaiac result negative.   Guaiac negative stool.           Musculoskeletal: Normal range of motion and moves all extremeties.       Neurological: She is alert and oriented to person, place, and time. She has normal reflexes.    Skin: Skin is warm and dry.    Psychiatric: She has a normal mood and affect. Her behavior is normal. Judgment and thought content normal.      Female chaperone present during exam       Assessment:     1. Well woman exam with routine gynecological exam    2. History of mastectomy, left    3. History of breast cancer              Plan:       Well woman exam with routine gynecological exam    History of mastectomy, left  -     BREAST PROSTHESIS FOR HOME USE    History of breast cancer       MMG ordered by oncology   Colonoscopy UTD  Risk assessment for inherited gyn cancer done   Healthy diet, lifestyle, and exercise   OTC Women's multi vitamin    Follow up in about 1 year (around 10/1/2025).

## 2024-11-04 ENCOUNTER — DOCUMENTATION ONLY (OUTPATIENT)
Dept: HEMATOLOGY/ONCOLOGY | Facility: CLINIC | Age: 54
End: 2024-11-04
Payer: MEDICARE

## 2024-11-04 NOTE — PROGRESS NOTES
Called pt to check on status of mammo. Pt reports she has not received a call. Pt given scheduling number and appt rescheduled for December.

## 2024-11-04 NOTE — PROGRESS NOTES
Spoke to pt regarding upcoming appt and mammogram. Pt state sshe never called to schedule mammo but will call and see when the soonest available appt is and follow up with our office.

## 2024-11-05 DIAGNOSIS — R91.1 SOLITARY PULMONARY NODULE: Primary | ICD-10-CM

## 2024-11-22 DIAGNOSIS — K21.9 GASTROESOPHAGEAL REFLUX DISEASE WITHOUT ESOPHAGITIS: ICD-10-CM

## 2024-11-27 RX ORDER — PANTOPRAZOLE SODIUM 40 MG/1
TABLET, DELAYED RELEASE ORAL
Qty: 90 TABLET | Refills: 0 | Status: SHIPPED | OUTPATIENT
Start: 2024-11-27

## 2024-12-11 LAB
ALBUMIN SERPL BCP-MCNC: 2.8 G/DL (ref 3.4–5)
ALBUMIN/GLOBULIN RATIO: 0.7 RATIO (ref 1.1–1.8)
ALP SERPL-CCNC: 163 U/L (ref 46–116)
ALT SERPL W P-5'-P-CCNC: 18 U/L (ref 12–78)
ANION GAP SERPL CALC-SCNC: 3 MMOL/L (ref 3–11)
AST SERPL-CCNC: 29 U/L (ref 15–37)
BASOPHILS NFR BLD: 0.4 % (ref 0–3)
BILIRUB SERPL-MCNC: 0.4 MG/DL (ref 0–1)
BUN SERPL-MCNC: 16 MG/DL (ref 7–18)
BUN/CREAT SERPL: 11.18 RATIO (ref 7–18)
CALCIUM SERPL-MCNC: 9.7 MG/DL (ref 8.8–10.5)
CHLORIDE SERPL-SCNC: 102 MMOL/L (ref 100–108)
CO2 SERPL-SCNC: 34 MMOL/L (ref 21–32)
CREAT SERPL-MCNC: 1.43 MG/DL (ref 0.55–1.02)
EOSINOPHIL NFR BLD: 2.2 % (ref 1–3)
ERYTHROCYTE [DISTWIDTH] IN BLOOD BY AUTOMATED COUNT: 14.5 % (ref 12.5–18)
GFR ESTIMATION: 43
GLOBULIN: 4 G/DL (ref 2.3–3.5)
GLUCOSE SERPL-MCNC: 93 MG/DL (ref 70–110)
HCT VFR BLD AUTO: 39.3 % (ref 37–47)
HGB BLD-MCNC: 12.4 G/DL (ref 12–16)
LYMPHOCYTES NFR BLD: 18.7 % (ref 25–40)
MCH RBC QN AUTO: 28.6 PG (ref 27–31.2)
MCHC RBC AUTO-ENTMCNC: 31.6 G/DL (ref 31.8–35.4)
MCV RBC AUTO: 90.8 FL (ref 80–97)
MONOCYTES NFR BLD: 7.9 % (ref 1–15)
NEUTROPHILS # BLD AUTO: 5.76 10*3/UL (ref 1.8–7.7)
NEUTROPHILS NFR BLD: 70.2 % (ref 37–80)
NUCLEATED RED BLOOD CELLS: 0 %
PLATELETS: 278 10*3/UL (ref 142–424)
POTASSIUM SERPL-SCNC: 4.2 MMOL/L (ref 3.6–5.2)
PROT SERPL-MCNC: 6.8 G/DL (ref 6.4–8.2)
RBC # BLD AUTO: 4.33 10*6/UL (ref 4.2–5.4)
SODIUM BLD-SCNC: 139 MMOL/L (ref 135–145)
WBC # BLD: 8.2 10*3/UL (ref 4.6–10.2)

## 2024-12-12 ENCOUNTER — OFFICE VISIT (OUTPATIENT)
Dept: HEMATOLOGY/ONCOLOGY | Facility: CLINIC | Age: 54
End: 2024-12-12
Payer: MEDICARE

## 2024-12-12 VITALS
DIASTOLIC BLOOD PRESSURE: 82 MMHG | HEART RATE: 74 BPM | WEIGHT: 218.19 LBS | SYSTOLIC BLOOD PRESSURE: 143 MMHG | BODY MASS INDEX: 36.35 KG/M2 | RESPIRATION RATE: 16 BRPM | OXYGEN SATURATION: 95 % | HEIGHT: 65 IN

## 2024-12-12 DIAGNOSIS — Z79.811 LONG TERM (CURRENT) USE OF AROMATASE INHIBITORS: ICD-10-CM

## 2024-12-12 DIAGNOSIS — Z12.31 ENCOUNTER FOR SCREENING MAMMOGRAM FOR MALIGNANT NEOPLASM OF BREAST: ICD-10-CM

## 2024-12-12 DIAGNOSIS — R91.1 SOLITARY PULMONARY NODULE: ICD-10-CM

## 2024-12-12 DIAGNOSIS — C50.912 INFILTRATING DUCTAL CARCINOMA OF LEFT BREAST, STAGE 2: Primary | ICD-10-CM

## 2024-12-12 DIAGNOSIS — R91.1 LUNG NODULE: ICD-10-CM

## 2024-12-12 RX ORDER — MUPIROCIN 20 MG/G
OINTMENT TOPICAL 2 TIMES DAILY
Qty: 30 G | Refills: 2 | Status: SHIPPED | OUTPATIENT
Start: 2024-12-12

## 2024-12-12 NOTE — PROGRESS NOTES
Medical Oncology Progress Note  [facility]     PATIENT: Cindi Magaña  : 1970 54 y.o. female  MRN [id number]     PCP: Dalila Miles MD  Consult Requested By:      Date of Service: 2024    Subjective:   Interim History:    No chief complaint on file.    Cindi Magaña is here for f/up  Visit.    The patient has surgery and radiation therapy to the left breast at [facility] in 2020 without complications..  The patient here for follow-up visit and she has chronic pain she is on Arimidex 1 milligram p.o. daily.  She was previously being followed by Dr. Homero Vega then Dr. Diego 2022.    Oncology History:    Left Breast Invasive ductal carcinoma, grade 1, ER 93%, DE 75%, HER-2 negative (score 0), Ki-67 8%, and ductal carcinoma in situ, grade 1, cribriform type with no necrosis and focally associated fine needed microcalcifications, very focal     Infiltrating duct carcinoma of upper inner quadrant of left female breast     ==10/18/2019 Initial Diagnosis       10/18/19 Screening Mmg: -new focal area of architectural distortion located near the 10 o'clock position of the middle left breast that is worrisome for malignancy.  No definite associated mass or suspicious calcification.    Left Breast US:   ==10:00 zone 2 position irregular hypoechoic shadowing mass correlates with mammographic abnormality and measures 1×1.4×1.4 cm.     ==2019 Biopsy   [facility] Patholo  Invasive ductal carcinoma, grade 1, ER 93%, DE 75%, HER-2 negative (score 0), Ki-67 8%, and ductal carcinoma in situ, grade 1, cribriform type with no necrosis      [facility] Biopsies  Left Breast 9:00 core bx with Bard coil-shaped clip placement: INVASIVE DUCTAL CARCINOMA WITH FOCAL NEUROENDOCRINE DIFFERENTIATION, INTERMEDIATE NUCLEAR GRADE, JAYDON HISTOLOGIC GRADE 1, ER/DE 95, HER2 negative (0)  Left axillary lymph node FNA with [place] U-shaped clip placement: metastatic  adenocarcinoma     ==1/30/2020 Lung Bx MDASPECIMEN SOURCEA. Lung, left upper lobe, FNA  No malignant cells identified  Rare bronchial epithelial cells present    ==3/2/2020 Staging form: Breast, AJCC 8th Edition  - Pathologic stage from 3/2/2020: Stage IB (pT3, pN1a, cM0, G2, ER+, HI+, HER2-) - Signed by Serafin Schneider MD on 3/3/2020  3/2020:   ==Oncotype DX recurrence score is 16 with distant recurrence risk at 9 years estimated to be 15% with hormonal therapy alone. There is no apparent benefit from chemotherapy. She will proceed with hormonal therapy alone with anastrozole 1 mg PO daily.   ==CT left lung nodule; per patient, this is old finding  ==08/06/2022: CT chest 8mm pulmonary nodule of the lingula with slight interval decrease in   size. There is questionable calcification within the central aspect of this    nodule.     Oncology History    No history exists.       Past Medical History:   Past Medical History:   Diagnosis Date    Breast cancer     Cancer     Heart disease     Hyperlipidemia     Hypertension     Kidney disease     Obesity     Rheumatoid arthritis involving multiple sites 2/27/2019    Stroke     Stroke        Past Surgical HIstory:   Past Surgical History:   Procedure Laterality Date    MASTECTOMY         Allergies:  Review of patient's allergies indicates:  No Known Allergies    Medications:  Current Outpatient Medications   Medication Sig Dispense Refill    allopurinoL (ZYLOPRIM) 300 MG tablet TAKE 1 TABLET(300 MG) BY MOUTH DAILY 90 tablet 0    anastrozole (ARIMIDEX) 1 mg Tab Take 1 tablet (1 mg total) by mouth once daily. 90 tablet 4    aspirin (ECOTRIN) 81 MG EC tablet Aspir-81 81 mg tablet,delayed release   Take 1 tablet every day by oral route.      benazepriL (LOTENSIN) 10 MG tablet TAKE 1 TABLET(10 MG) BY MOUTH DAILY 90 tablet 1    carvediloL (COREG) 25 MG tablet TAKE 1 TABLET(25 MG) BY MOUTH TWICE DAILY WITH MEALS 180 tablet 0    fluticasone propionate (FLONASE) 50 mcg/actuation  nasal spray SHAKE LQ AND U 1 SPR IEN BID 18.2 mL 6    folic acid (FOLVITE) 1 MG tablet folic acid 1 mg tablet   TK 2 TS PO QD      furosemide (LASIX) 20 MG tablet furosemide 20 mg tablet      gabapentin (NEURONTIN) 600 MG tablet Take 600 mg by mouth 2 (two) times daily.      HYDROcodone-acetaminophen (NORCO)  mg per tablet Take 1 tablet by mouth.      mupirocin (BACTROBAN) 2 % ointment Apply topically 3 (three) times daily as needed (wound). 30 g 2    pantoprazole (PROTONIX) 40 MG tablet TAKE 1 TABLET BY MOUTH EVERY DAY 90 tablet 0    pravastatin (PRAVACHOL) 80 MG tablet Take 1 tablet (80 mg total) by mouth every evening. 90 tablet 3    tiZANidine (ZANAFLEX) 4 MG tablet Take 4 mg by mouth 3 (three) times daily.      venlafaxine (EFFEXOR-XR) 75 MG 24 hr capsule TAKE 1 CAPSULE BY MOUTH EVERY DAY 90 capsule 1     No current facility-administered medications for this visit.       Family History:   Family History   Problem Relation Name Age of Onset    Hypertension Mother      Hypertension Father      Hypertension Sister      Lupus Sister      Hypertension Brother         Social History:  reports that she has quit smoking. Her smoking use included cigarettes. She has never been exposed to tobacco smoke. She has never used smokeless tobacco. She reports that she does not currently use alcohol. She reports that she does not use drugs.    Review of Systemas    Constitutional: No change in weight, appetie, fatigue, fever, or night sweats  Eyes: No changes in vision  Ears, Nose, Mouth, Throat, and Face: No hearing problems, ear pain, rummy nose, or sore throat  Respiratory: No shortness of breath or cough  Cardiovascular: No chest pain, palpitations or dizziness  Gastrointestinal: No abdominal pain, hematochezia, melena  Genitourinary: No dysuria, hematuria, nocturia, menstrual problems, post menopausal  Integumentary/Breast: No rashes or itching  Hematologic/Lymphatic: No anemia or bleeding  abnormalities  Musculoskeletal: No joint or muscle abnormalities  Neurological: No sensory or motor problems, no headaches  Behavioral/Psych: No depression, anxiety, cognitive problems, or stress  Endocrine: No diabetes or thyroid problem  Allergic/Immunologic: See allergy above    Physical Exam      Vitals:   There were no vitals filed for this visit.    BMI: There is no height or weight on file to calculate BMI.  BSA There is no height or weight on file to calculate BSA.  ECOG Performance Status:   ECOG SCORE           GENERAL APPEARANCE: Well developed, well nourished, in no acute distress.  SKIN: Inspection of the skin reveals no rashes, ulcerations or petechiae.  HEENT: The sclerae were anicteric and conjunctivae were pink and moist. Extraocular movements were intact and pupils were equal, round, and reactive to light with normal accommodation. External inspection of the ears and nose showed no scars, lesions, or masses. Lips, teeth, and gums showed normal mucosa. The oral mucosa, hard and soft palate, tongue and posterior pharynx were normal.  NECK: Supple and symmetric. There was no thyroid enlargement, and no tenderness, or masses were felt.  CRESPIRATORY: Normal AP diameter and normal contour without any kyphoscoliosis. LUNGS: Auscultation of the lungs revealed normal breath sounds without any other adventitious sounds or rubs.  CARDIOVASCULAR: There was a regular rate and rhythm without any murmurs, gallops, rubs. The carotid pulses were normal and 2+ bilaterally without bruits. Peripheral pulses were 2+ and symmetric.  GASTROINTESTINAL: Soft and nontender with normal bowel sounds. The liver span was approximately 5-6 cm in the right midclavicular line by percussion. The liver edge was nontender. The spleen was not palpable. There were no inguinal or umbilical hernias noted. No ascites was noted.  LYMPH NODES: No lymphadenopathy was appreciated in the neck, axillae or groin.   MUSCULOSKELETAL: Gait was  normal. There was no tenderness or effusions noted. Muscle strength and tone were normal. EXTREMITIES: No cyanosis, clubbing. +ve for Lymphedema in left upper extremitie  NEUROLOGIC: Alert and oriented x 3. Normal affect. Gait was normal. Normal deep tendon reflexes with no pathological reflexes. Sensation to touch was normal.  PSYCHIATRIC: good mood, orientated to place, time and person     Labs and Imagings     Orders Only on 12/11/2024   Component Date Value Ref Range Status    Sodium 12/11/2024 139  135 - 145 mmol/L Final    Potassium 12/11/2024 4.2  3.6 - 5.2 mmol/L Final    Chloride 12/11/2024 102  100 - 108 mmol/L Final    CO2 12/11/2024 34 (H)  21 - 32 mmol/L Final    Anion Gap 12/11/2024 3.0  3.0 - 11.0 mmol/L Final    BUN 12/11/2024 16  7 - 18 mg/dL Final    Creatinine 12/11/2024 1.43 (H)  0.55 - 1.02 mg/dL Final    GFR ESTIMATION 12/11/2024 43 (L)  >60 Final    CKD-EPI Creatinine Equation(2021) used for calculating GFR    BUN/Creatinine Ratio 12/11/2024 11.18  7 - 18 Ratio Final    Glucose 12/11/2024 93  70 - 110 mg/dL Final    Calcium 12/11/2024 9.7  8.8 - 10.5 mg/dL Final    Total Bilirubin 12/11/2024 0.4  0.0 - 1.0 mg/dL Final    AST 12/11/2024 29  15 - 37 U/L Final    ALT 12/11/2024 18  12 - 78 U/L Final    Total Protein 12/11/2024 6.8  6.4 - 8.2 g/dL Final    Albumin 12/11/2024 2.8 (L)  3.4 - 5.0 g/dL Final    Globulin 12/11/2024 4.0 (H)  2.3 - 3.5 g/dL Final    Albumin/Globulin Ratio 12/11/2024 0.700 (L)  1.1 - 1.8 Ratio Final    Alkaline Phosphatase 12/11/2024 163 (H)  46 - 116 U/L Final   Orders Only on 12/11/2024   Component Date Value Ref Range Status    WBC 12/11/2024 8.2  4.6 - 10.2 10*3/uL Final    RBC 12/11/2024 4.33  4.2 - 5.4 10*6/uL Final    Hemoglobin 12/11/2024 12.4  12.0 - 16.0 g/dL Final    Hematocrit 12/11/2024 39.3  37.0 - 47.0 % Final    MCV 12/11/2024 90.8  80 - 97 fL Final    MCH 12/11/2024 28.6  27.0 - 31.2 pg Final    MCHC 12/11/2024 31.6 (L)  31.8 - 35.4 g/dL Final    RDW RBC  Auto-Rto 12/11/2024 14.5  12.5 - 18.0 % Final    Platelets 12/11/2024 278  142 - 424 10*3/uL Final    Neutrophils 12/11/2024 70.2  37 - 80 % Final    Lymphocytes 12/11/2024 18.7 (L)  25 - 40 % Final    Monocytes 12/11/2024 7.9  1 - 15 % Final    Eosinophils 12/11/2024 2.2  1 - 3 % Final    Basophils 12/11/2024 0.4  0 - 3 % Final    nRBC# 12/11/2024 0.0  % Final    Neutrophils Absolute 12/11/2024 5.76  1.8 - 7.7 10*3/uL Final   Orders Only on 09/25/2024   Component Date Value Ref Range Status    Vit D, 25-Hydroxy 09/25/2024 29 (L)  >30 ng/mL Final    Comment: Vitamin D deficiency has been defined by the [place] ofMedicine and an Endocrine Society practice guideline as alevel of serum 25-OH vitamin D less than 20 ng/mL. TheEndocrine Society further defined vitamin D insufficiency asa level between 21 and 29   ng/mL.     Orders Only on 09/25/2024   Component Date Value Ref Range Status    Microalb, Ur 09/25/2024 233.5 (H)  0 - 20 mg/L Final    Creatinine, Urine 09/25/2024 421.5 (H)  10 - 175 mg/dL Final    Microalb/Creat Ratio 09/25/2024 55 (H)  0 - 30 mg/g Final   Orders Only on 09/25/2024   Component Date Value Ref Range Status    Estimated Avg Glucose 09/25/2024 140  mg/dL Final    Hemoglobin A1C 09/25/2024 6.1  4.2 - 6.3 % Final    Comment: Ranges for Hemoglobin A1C in diabetic patients:Hemoglobin A1C          Degree of glucose control      <7.0%             Well-controlled      >8.0%             Poorly-controlledDegrees of glucose control based on goals set by theAmerican Diabetes   Association.     Orders Only on 09/25/2024   Component Date Value Ref Range Status    Sodium 09/25/2024 134  131 - 143 mmol/L Final    Potassium 09/25/2024 4.5  3.5 - 5.1 mmol/L Final    Chloride 09/25/2024 105  98 - 107 mmol/L Final    CO2 09/25/2024 22  21 - 32 mmol/L Final    Anion Gap 09/25/2024 7.0  3.0 - 11.0 mmol/L Final    BUN 09/25/2024 29.0 (H)  7.0 - 18.0 mg/dL Final    Creatinine 09/25/2024 2.96 (H)  0.55 - 1.02 mg/dL  Final    GFR ESTIMATION 09/25/2024 20 (L)  >60 Final    BUN/Creatinine Ratio 09/25/2024 9.79  Ratio Final    Glucose 09/25/2024 98  74 - 106 mg/dL Final    Calcium 09/25/2024 9.3  8.5 - 10.1 mg/dL Final    Total Bilirubin 09/25/2024 0.3  0.2 - 1.0 mg/dL Final    AST 09/25/2024 19  15 - 37 U/L Final    ALT 09/25/2024 17  13 - 56 U/L Final    Total Protein 09/25/2024 7.3  6.4 - 8.2 g/dL Final    Albumin 09/25/2024 3.5  3.4 - 5.0 g/dL Final    Globulin 09/25/2024 3.8 (H)  2.3 - 3.5 g/dL Final    Albumin/Globulin Ratio 09/25/2024 0.921 (L)  1.1 - 1.8 Ratio Final    Alkaline Phosphatase 09/25/2024 141 (H)  45 - 117 U/L Final    Free T4 09/25/2024 1.03  0.76 - 1.46 ng/dL Final   Orders Only on 09/25/2024   Component Date Value Ref Range Status    WBC 09/25/2024 6.2  4.5 - 10.0 10*3/uL Final    RBC 09/25/2024 3.98 (L)  4.20 - 5.40 10*6/uL Final    Hemoglobin 09/25/2024 11.4 (L)  12.0 - 16.0 g/dL Final    Hematocrit 09/25/2024 37.4  37.0 - 47.0 % Final    MCV 09/25/2024 94.0  80.0 - 99.0 fL Final    MCH 09/25/2024 28.6  27.0 - 32.0 pg Final    MCHC 09/25/2024 30.5 (L)  32.0 - 36.0 % Final    RDW RBC Auto-Rto 09/25/2024 14.6  0.0 - 15.5 % Final    Platelets 09/25/2024 207  130 - 400 10*3/uL Final    MPV 09/25/2024 11.5  9.2 - 12.2 fL Final    Neutrophils 09/25/2024 64.2  50 - 80 % Final    Immature Granulocytes 09/25/2024 0.30  0.0 - 0.43 % Final    Lymphocytes 09/25/2024 25.2  20.0 - 45.0 % Final    Monocytes 09/25/2024 7.1  2 - 10 % Final    Eosinophils 09/25/2024 2.7  0 - 6 % Final    Basophils 09/25/2024 0.5  0 - 3 % Final    nRBC# 09/25/2024 0.0  0 - 0.2 % Final    Neutrophils Absolute 09/25/2024 4.0  1.4 - 7.0 10*3/uL Final    Immature Granulocytes Absolute 09/25/2024 0.0200  0.0 - 0.0310 thou/uL Final    Lymphocytes Absolute 09/25/2024 1.6  1.2 - 4.0 10*3/uL Final    Monocytes Absolute 09/25/2024 0.4  0.1 - 0.8 10*3/uL Final    Eosinophils Absolute 09/25/2024 0.2  0.0 - 0.6 10*3/uL Final    Basophils Absolute  09/25/2024 0.0  0.0 - 0.3 10*3/uL Final    nRBC Count Absolute 09/25/2024 0.000  0 - 0.012 thou/uL Final   Orders Only on 05/09/2024   Component Date Value Ref Range Status    WBC 05/09/2024 6.6  4.6 - 10.2 10*3/uL Final    RBC 05/09/2024 4.13 (L)  4.2 - 5.4 10*6/uL Final    Hemoglobin 05/09/2024 11.9 (L)  12.0 - 16.0 g/dL Final    Hematocrit 05/09/2024 38.5  37.0 - 47.0 % Final    MCV 05/09/2024 93.2  80 - 97 fL Final    MCH 05/09/2024 28.8  27.0 - 31.2 pg Final    MCHC 05/09/2024 30.9 (L)  31.8 - 35.4 g/dL Final    RDW RBC Auto-Rto 05/09/2024 16.4  12.5 - 18.0 % Final    Platelets 05/09/2024 213  142 - 424 10*3/uL Final    Neutrophils 05/09/2024 61.0  37 - 80 % Final    Lymphocytes 05/09/2024 24.5 (L)  25 - 40 % Final    Monocytes 05/09/2024 11.4  1 - 15 % Final    Eosinophils 05/09/2024 2.1  1 - 3 % Final    Basophils 05/09/2024 0.5  0 - 3 % Final    nRBC# 05/09/2024 0.0  % Final    Neutrophils Absolute 05/09/2024 4.01  1.8 - 7.7 10*3/uL Final    Sodium 05/09/2024 141  135 - 145 mmol/L Final    Potassium 05/09/2024 4.6  3.6 - 5.2 mmol/L Final    Chloride 05/09/2024 107  100 - 108 mmol/L Final    CO2 05/09/2024 25  21 - 32 mmol/L Final    Anion Gap 05/09/2024 9.0  3.0 - 11.0 mmol/L Final    BUN 05/09/2024 32 (H)  7 - 18 mg/dL Final    Creatinine 05/09/2024 1.42 (H)  0.55 - 1.02 mg/dL Final    GFR ESTIMATION 05/09/2024 47 (L)  >60 Final               DESCRIPTION       GLOMERULAR FILTRATION RATE             NORMAL                     >60             KIDNEY  DISEASE           15-60             KIDNEY FAILURE             <15    BUN/Creatinine Ratio 05/09/2024 22.53 (H)  7 - 18 Ratio Final    Glucose 05/09/2024 81  70 - 110 mg/dL Final    Calcium 05/09/2024 8.8  8.8 - 10.5 mg/dL Final    Total Bilirubin 05/09/2024 0.3  0.0 - 1.0 mg/dL Final    AST 05/09/2024 27  15 - 37 U/L Final    ALT 05/09/2024 23  12 - 78 U/L Final    Total Protein 05/09/2024 6.9  6.4 - 8.2 g/dL Final    Albumin 05/09/2024 3.2 (L)  3.4 - 5.0 g/dL  Final    Globulin 05/09/2024 3.7 (H)  2.3 - 3.5 g/dL Final    Albumin/Globulin Ratio 05/09/2024 0.864 (L)  1.1 - 1.8 Ratio Final    Alkaline Phosphatase 05/09/2024 157 (H)  46 - 116 U/L Final    Hypochromia 05/09/2024 1+   Final   Office Visit on 04/04/2024   Component Date Value Ref Range Status    Vit D, 25-Hydroxy 04/04/2024 19 (L)  >30 ng/mL Final    Comment: Vitamin D deficiency has been defined by the [place] ofMedicine and an Endocrine Society practice guideline as alevel of serum 25-OH vitamin D less than 20 ng/mL. TheEndocrine Society further defined vitamin D insufficiency asa level between 21 and 29   ng/mL.      Estimated Avg Glucose 04/04/2024 136  mg/dL Final    Hemoglobin A1C 04/04/2024 6.0  4.2 - 6.3 % Final    Comment: Ranges for Hemoglobin A1C in diabetic patients:Hemoglobin A1C          Degree of glucose control      <7.0%             Well-controlled      >8.0%             Poorly-controlledDegrees of glucose control based on goals set by theAmerican Diabetes   Association.      Sodium 04/04/2024 137  131 - 143 mmol/L Final    Potassium 04/04/2024 4.8  3.5 - 5.1 mmol/L Final    Chloride 04/04/2024 108 (H)  98 - 107 mmol/L Final    CO2 04/04/2024 26  21 - 32 mmol/L Final    Anion Gap 04/04/2024 3.0  3.0 - 11.0 mmol/L Final    BUN 04/04/2024 16.0  7.0 - 18.0 mg/dL Final    Creatinine 04/04/2024 1.26 (H)  0.55 - 1.02 mg/dL Final    GFR ESTIMATION 04/04/2024 53 (L)  >60 Final    BUN/Creatinine Ratio 04/04/2024 12.69  Ratio Final    Glucose 04/04/2024 105  74 - 106 mg/dL Final    Calcium 04/04/2024 9.8  8.5 - 10.1 mg/dL Final    Total Bilirubin 04/04/2024 0.3  0.2 - 1.0 mg/dL Final    AST 04/04/2024 17  15 - 37 U/L Final    ALT 04/04/2024 20  13 - 56 U/L Final    Total Protein 04/04/2024 7.2  6.4 - 8.2 g/dL Final    Albumin 04/04/2024 3.2 (L)  3.4 - 5.0 g/dL Final    Alkaline Phosphatase 04/04/2024 145 (H)  45 - 117 U/L Final   Patient Outreach on 12/18/2023   Component Date Value Ref Range Status     BCS Recommendation External 11/07/2023 Repeat mammogram in 1 year   Final   There may be more visits with results that are not included.         Assessment:     Principle Problem: No primary diagnosis found.   Co-morbidity/Active Problems:   Patient Active Problem List   Diagnosis    Rheumatoid arthritis involving multiple sites    Sjogren's syndrome    Inflammation of sacroiliac joint    Ankylosing spondylitis    Cerebrovascular accident-2011    Essential hypertension    Hyperlipidemia    Osteoarthritis of knee    Carpal tunnel syndrome    Anxiety    Gastroesophageal reflux disease without esophagitis    Allergic rhinitis    Lumbar radiculopathy, chronic    Infiltrating ductal carcinoma of left breast, stage 2    Lung nodule    Stage 3a chronic kidney disease    Class 2 severe obesity due to excess calories with serious comorbidity and body mass index (BMI) of 36.0 to 36.9 in adult    Prediabetes    Gout    Vitamin D insufficiency    Acute renal failure    ELVI (acute kidney injury)        Cindi Magaña is a 54 y.o. female with PMH of There were no encounter diagnoses., with No primary diagnosis found.     ==============================================  Principal DX:  left segmental mastectomy and axillary node dissection.   Pathological findings shows large area of invasive ductal carcinoma, histological grade 2.  1 positive axillary node for metastasis.   pathological stage IB, histological grade 2, ER +, HI +, HER-2/[place] -, and Ki-67 low,left  breast cancer.   OncoType 16  S/p Radiation Oncology     Stage     Metastatic Sites node, ? Lung nodule . Patient reports this was biopsied at [facility] and was found to be non-malignant  Co-morbidities  RA      Plan:   Overall Plan:   Goal of Treatment:  Source of Guideline: NCCN version , Uptodate      1. Left Breast invasive ductal Carcinoma:     08/16/2022: Patient with Stage II breast cancer currently on Arimidex with questionable lung nodule at last visit. She  had repeat scan on 6/2/2022 which showed 8mm pulmonary nodule of the lingula with slight interval decrease in  size. There is questionable calcification within the central aspect of this  Nodule.  She had left breast mastectomy, but is due for left breast yearly mammo in September 2022. Patient reports she still has occasional lymphedema to left axilla and arm. She does have a lymphedema sleeve that helps. She wears at night.  Dexa completed 03/02/2022 reviewed with patient which showed normal bone density. She was previously taking otc Calcium+D, however, was changed to rx vitamin D per pcp and is not taking calcium.  I encouraged to continue otc Calcium, may get without added vitamin D if she is already taking rx.   ==02/28/2023: Arimidex well tolerated, mammo reviewed from September 2022 which was [facility].  No masses, lumps, axillary adenopathy or skin changes to breast.  CT reviewed, patient continues to have lung nodule, previously 8mm, now 1mm. Recommended repeat ct scan in 6 months, however declines further imaging of lung lesion.    08/29/2023: Doing well on Arimidex.  Continues to decline repeat ct chest.  No new masses, lumps or skin changes to breast.  She had left breast mastectomy, right breast mammo due September 2023, will order.  Plan to continue Arimidex  ==05/13/2024: Tolerating Arimidex well, bone density due, last was march 2022.  She sees Dr. Pham for RA, not on any DMARDs for RA. Also has lung nodule per previous ct scans which has been stable, is amendable to repeat ct scan to monitor, will order. Dimitrisr elvated at this [facility], she was a little dehydrated, has been wnl, recently saw Dr. Brice stated was stable, will recheck before ct chest.  Mammo due November. No changes to breast, no skin changes, masses, adenopathy.  ==12/12/2024: Mammo reviewed, cat 2, ARMIN.  CT chest reviewed, 8mm lung nodule noted stable since at least 2022.  Will continue monitoring and recheck in 6 months. Dexa scan reviewed,  bone density improved since 2022 and is normal.     2. Lung Nodule:  ==Improved per March 2022 ct scan  ==Stable per ct     3. Long term use of Aromatase Inhibitor  ==Tolerated well  ==Dexa completed November 2024 -         To Do:  Continue Arimidex  Rtc 6 months cbc cmp  Mammo due November 2025  CT chest in 6 months prior to rtc    Total time spent in counseling and discussion was more than 30 minutes. I discussed in detail further management options at this time and discussion on relevant tests, imaging and ancillary tests as part of the management plan.

## 2024-12-20 ENCOUNTER — DOCUMENTATION ONLY (OUTPATIENT)
Dept: OBSTETRICS AND GYNECOLOGY | Facility: CLINIC | Age: 54
End: 2024-12-20
Payer: MEDICARE

## 2024-12-25 DIAGNOSIS — M10.9 GOUT, UNSPECIFIED CAUSE, UNSPECIFIED CHRONICITY, UNSPECIFIED SITE: ICD-10-CM

## 2024-12-25 DIAGNOSIS — I10 ESSENTIAL HYPERTENSION: ICD-10-CM

## 2025-01-02 RX ORDER — CARVEDILOL 25 MG/1
25 TABLET ORAL 2 TIMES DAILY WITH MEALS
Qty: 180 TABLET | Refills: 0 | Status: SHIPPED | OUTPATIENT
Start: 2025-01-02

## 2025-01-02 RX ORDER — ALLOPURINOL 300 MG/1
TABLET ORAL
Qty: 90 TABLET | Refills: 0 | Status: SHIPPED | OUTPATIENT
Start: 2025-01-02

## 2025-01-07 ENCOUNTER — OFFICE VISIT (OUTPATIENT)
Dept: FAMILY MEDICINE | Facility: CLINIC | Age: 55
End: 2025-01-07
Payer: MEDICARE

## 2025-01-07 VITALS
HEIGHT: 65 IN | OXYGEN SATURATION: 97 % | HEART RATE: 84 BPM | RESPIRATION RATE: 15 BRPM | TEMPERATURE: 98 F | DIASTOLIC BLOOD PRESSURE: 80 MMHG | BODY MASS INDEX: 35.82 KG/M2 | WEIGHT: 215 LBS | SYSTOLIC BLOOD PRESSURE: 134 MMHG

## 2025-01-07 DIAGNOSIS — L81.9 HYPERPIGMENTATION OF SKIN OF CHEEK: ICD-10-CM

## 2025-01-07 DIAGNOSIS — N18.31 STAGE 3A CHRONIC KIDNEY DISEASE: ICD-10-CM

## 2025-01-07 DIAGNOSIS — C77.3 MALIGNANT NEOPLASM METASTATIC TO LYMPH NODE OF AXILLA: ICD-10-CM

## 2025-01-07 DIAGNOSIS — N18.32 STAGE 3B CHRONIC KIDNEY DISEASE: ICD-10-CM

## 2025-01-07 DIAGNOSIS — R73.03 PREDIABETES: ICD-10-CM

## 2025-01-07 DIAGNOSIS — M06.9 RHEUMATOID ARTHRITIS INVOLVING MULTIPLE SITES, UNSPECIFIED WHETHER RHEUMATOID FACTOR PRESENT: ICD-10-CM

## 2025-01-07 DIAGNOSIS — I10 ESSENTIAL HYPERTENSION: Primary | ICD-10-CM

## 2025-01-07 PROBLEM — E66.01 CLASS 2 SEVERE OBESITY DUE TO EXCESS CALORIES WITH SERIOUS COMORBIDITY AND BODY MASS INDEX (BMI) OF 36.0 TO 36.9 IN ADULT: Status: RESOLVED | Noted: 2022-01-27 | Resolved: 2025-01-07

## 2025-01-07 PROBLEM — E66.812 CLASS 2 SEVERE OBESITY DUE TO EXCESS CALORIES WITH SERIOUS COMORBIDITY AND BODY MASS INDEX (BMI) OF 36.0 TO 36.9 IN ADULT: Status: RESOLVED | Noted: 2022-01-27 | Resolved: 2025-01-07

## 2025-01-07 PROCEDURE — 3008F BODY MASS INDEX DOCD: CPT | Mod: CPTII,,, | Performed by: INTERNAL MEDICINE

## 2025-01-07 PROCEDURE — 3075F SYST BP GE 130 - 139MM HG: CPT | Mod: CPTII,,, | Performed by: INTERNAL MEDICINE

## 2025-01-07 PROCEDURE — 99213 OFFICE O/P EST LOW 20 MIN: CPT | Mod: S$PBB,,, | Performed by: INTERNAL MEDICINE

## 2025-01-07 PROCEDURE — 1159F MED LIST DOCD IN RCRD: CPT | Mod: CPTII,,, | Performed by: INTERNAL MEDICINE

## 2025-01-07 PROCEDURE — 1160F RVW MEDS BY RX/DR IN RCRD: CPT | Mod: CPTII,,, | Performed by: INTERNAL MEDICINE

## 2025-01-07 PROCEDURE — 3079F DIAST BP 80-89 MM HG: CPT | Mod: CPTII,,, | Performed by: INTERNAL MEDICINE

## 2025-01-07 RX ORDER — AMLODIPINE BESYLATE 5 MG/1
5 TABLET ORAL
COMMUNITY
Start: 2024-11-01

## 2025-01-07 RX ORDER — TRETINOIN 0.25 MG/G
CREAM TOPICAL NIGHTLY
Qty: 20 G | Refills: 2 | Status: SHIPPED | OUTPATIENT
Start: 2025-01-07

## 2025-01-07 NOTE — PROGRESS NOTES
"Subjective:       Patient ID: Cindi Magaña is a 54 y.o. female.    Chief Complaint: Consult    HPI    54 y.o. female with Malignant neoplasm metastatic to lymph node of axilla, Rheumatoid arthritis involving multiple sites, unspecified whether rheumatoid factor present, Stage 3b chronic kidney disease   presents for follow up of chronic medical problems:     HTN: carvedilol 25mg BID, amlodipine 5mg daily    preDM: lifestyle managed    Gout: allopurinol 300mg    HLD: pravastatin 80mg    GERD: pantoprazole 40mg    CKD3: est w/ nephrology.     Rash in November. Wasn't able to get apt for eval. Went to , received steroid shot. Has residual hyperpigmentation.     Review of Systems   Constitutional:  Negative for fever.   Respiratory:  Negative for shortness of breath.    Cardiovascular:  Negative for chest pain.   Genitourinary:  Negative for decreased urine volume.   Musculoskeletal:  Negative for gait problem.   Skin:  Positive for rash.   Neurological:  Negative for syncope.       Objective:        Vitals:    01/07/25 1425 01/07/25 1448   BP: (!) 140/82 134/80   BP Location: Right arm    Patient Position: Sitting    Pulse: 84    Resp: 15    Temp: 97.5 °F (36.4 °C)    TempSrc: Temporal    SpO2: 97%    Weight: 97.5 kg (215 lb)    Height: 5' 5" (1.651 m)        Body mass index is 35.78 kg/m².    Physical Exam  Constitutional:       General: She is not in acute distress.     Appearance: She is well-developed.   HENT:      Head: Normocephalic and atraumatic.      Right Ear: External ear normal.      Left Ear: External ear normal.   Cardiovascular:      Rate and Rhythm: Normal rate.   Pulmonary:      Effort: Pulmonary effort is normal.   Skin:     General: Skin is warm and dry.      Comments: Few areas right check and left neck with hyperpigmentation   Neurological:      Mental Status: She is alert.   Psychiatric:         Behavior: Behavior normal.         Assessment:     1. Essential hypertension    2. " Prediabetes    3. Malignant neoplasm metastatic to lymph node of axilla    4. Stage 3b chronic kidney disease    5. Rheumatoid arthritis involving multiple sites, unspecified whether rheumatoid factor present    6. Stage 3a chronic kidney disease    7. Hyperpigmentation of skin of cheek           Plan:         1. Essential hypertension  - cont coreg and amlodipine. BP controlled.    2. Prediabetes  Lifestyle managed.    3. Malignant neoplasm metastatic to lymph node of axilla  - chronic, follows w/ oncology    4. Stage 3b chronic kidney disease  - chronic, stable. Monitor w/ labs. Oral hydration. Avoid nsaids. Established w/ Nephrologist.       5. Rheumatoid arthritis involving multiple sites, unspecified whether rheumatoid factor present  - chronic, stable, asymptomatic. Followed by Rheum      6. Stage 3a chronic kidney disease  - chronic, stable. Monitor w/ labs. Oral hydration. Avoid nsaids.      7. Hyperpigmentation of skin of cheek  - discussed management w/ pt. Discussed topical med use- r/b/se discussed, pt expresses understanding  - tretinoin (RETIN-A) 0.025 % cream; Apply topically every evening.  Dispense: 20 g; Refill: 2      Unless there are intervening problems, Follow up if symptoms worsen or fail to improve, for keep February apt as previously scheduled.      Patient note was created using MModal Dictation.  Any errors in syntax or even information may not have been identified and edited on initial review prior to signing this note.      I spent a total of 25 minutes on the day of the visit.  This includes face to face time and non-face to face time preparing to see the patient (eg, review of tests), obtaining and/or reviewing separately obtained history, documenting clinical information in the electronic or other health record, independently interpreting results and communicating results to the patient/family/caregiver, or care coordinator.

## 2025-01-08 ENCOUNTER — TELEPHONE (OUTPATIENT)
Dept: FAMILY MEDICINE | Facility: CLINIC | Age: 55
End: 2025-01-08
Payer: MEDICARE

## 2025-01-08 DIAGNOSIS — L81.0 HYPERPIGMENTATION OF SKIN, POSTINFLAMMATORY: Primary | ICD-10-CM

## 2025-01-08 RX ORDER — HYDROQUINONE 40 MG/G
CREAM TOPICAL
Qty: 28.35 G | Refills: 0 | Status: SHIPPED | OUTPATIENT
Start: 2025-01-08

## 2025-01-08 NOTE — TELEPHONE ENCOUNTER
----- Message from Simi sent at 1/8/2025  2:01 PM CST -----  Contact: BEAU BENEDICT [[id number]]  .Type:  Patient Returning Call    Who Called:BEAU BENEDICT [[id number]]  Who Left Message for Patient:Elena Camarena MA  Does the patient know what this is regarding?:No  Would the patient rather a call back or a response via [facility]? Call  Best Call Back Number:.[phone] (home)

## 2025-01-09 ENCOUNTER — TELEPHONE (OUTPATIENT)
Dept: FAMILY MEDICINE | Facility: CLINIC | Age: 55
End: 2025-01-09
Payer: MEDICARE

## 2025-01-09 NOTE — TELEPHONE ENCOUNTER
LMVMx1 for pt. To call back.    Need to let pt. Know her ins. Is not wanting to pay for Hydroquinone 4% Cream either.      Pt. Will need to contact her ins. To find out what med. They will pay for and call us back to let us know and Dr. Dalila Miles can prescribe that one.

## 2025-01-20 DIAGNOSIS — M10.9 GOUT, UNSPECIFIED CAUSE, UNSPECIFIED CHRONICITY, UNSPECIFIED SITE: ICD-10-CM

## 2025-01-28 RX ORDER — ALLOPURINOL 300 MG/1
300 TABLET ORAL DAILY
Qty: 90 TABLET | Refills: 0 | Status: SHIPPED | OUTPATIENT
Start: 2025-01-28

## 2025-02-24 DIAGNOSIS — K21.9 GASTROESOPHAGEAL REFLUX DISEASE WITHOUT ESOPHAGITIS: ICD-10-CM

## 2025-02-25 RX ORDER — PANTOPRAZOLE SODIUM 40 MG/1
40 TABLET, DELAYED RELEASE ORAL
Qty: 90 TABLET | Refills: 0 | Status: SHIPPED | OUTPATIENT
Start: 2025-02-25

## 2025-03-28 DIAGNOSIS — M10.9 GOUT, UNSPECIFIED CAUSE, UNSPECIFIED CHRONICITY, UNSPECIFIED SITE: ICD-10-CM

## 2025-03-28 DIAGNOSIS — F41.9 ANXIETY: ICD-10-CM

## 2025-03-28 DIAGNOSIS — K21.9 GASTROESOPHAGEAL REFLUX DISEASE WITHOUT ESOPHAGITIS: ICD-10-CM

## 2025-03-28 DIAGNOSIS — I10 ESSENTIAL HYPERTENSION: ICD-10-CM

## 2025-04-01 RX ORDER — PANTOPRAZOLE SODIUM 40 MG/1
40 TABLET, DELAYED RELEASE ORAL DAILY
Qty: 90 TABLET | Refills: 0 | Status: SHIPPED | OUTPATIENT
Start: 2025-04-01

## 2025-04-01 RX ORDER — VENLAFAXINE HYDROCHLORIDE 75 MG/1
CAPSULE, EXTENDED RELEASE ORAL
Qty: 90 CAPSULE | Refills: 1 | Status: SHIPPED | OUTPATIENT
Start: 2025-04-01

## 2025-04-01 RX ORDER — ALLOPURINOL 300 MG/1
300 TABLET ORAL DAILY
Qty: 90 TABLET | Refills: 0 | Status: SHIPPED | OUTPATIENT
Start: 2025-04-01

## 2025-04-01 RX ORDER — CARVEDILOL 25 MG/1
25 TABLET ORAL 2 TIMES DAILY WITH MEALS
Qty: 180 TABLET | Refills: 0 | Status: SHIPPED | OUTPATIENT
Start: 2025-04-01

## 2025-05-06 ENCOUNTER — TELEPHONE (OUTPATIENT)
Dept: PRIMARY CARE CLINIC | Facility: CLINIC | Age: 55
End: 2025-05-06
Payer: MEDICARE

## 2025-05-06 NOTE — TELEPHONE ENCOUNTER
Spoke with pt 10 minutes ago and informed her that  does not accept NEW MEDICARE PATIENTS UNDER THE AGE OF 65 . Pt did not verbalize understanding.

## 2025-05-06 NOTE — TELEPHONE ENCOUNTER
----- Message from Karen sent at 5/6/2025 11:12 AM CDT -----  Contact: pt  Pt calling about appt stating nurse stated not take pt from other doctor  but she talked to domingo stated she would take her as pt.  Pt requesting a call from Domingo.  Pt did request to be transferred her care and got approval.   Pt can be reached at [phone].Thanks,

## 2025-05-06 NOTE — TELEPHONE ENCOUNTER
Called pt to inform her that  does not accept new medicare patients under the age of 65. Pt stated I was lying and she did not ask for me to call her she wanted  to call her directly. I informed pt that  does not call patients directly I take the messages and send them to . Pt stated that I was lying once again and that she was going to call [facility] and report me. Pt was very rude and hostile with Mimi PEREZ and RAFAEL.

## 2025-05-06 NOTE — TELEPHONE ENCOUNTER
----- Message from Isaac sent at 5/6/2025 11:49 AM CDT -----  .Type: Patient Call Back  Who called: Patient  What is the request in detail:  called in concerning eca appt .Patient would like to speak with provider directly . Patient was advised by the provider last week when she went in for appt with sister Felipa Magaña for appt that provider will accept her as a patient . Patient was advised today that provider doesn't accept patient with medicare 65 and under but patient advised that her sister Mary Magaña is a patient with medicare and she is under 65 . Patient would only like a call back from the provider  Can the clinic reply by [facility]?     Would the patient rather a call back or a response via [facility]?  Best call back number: .[phone]

## 2025-06-10 NOTE — PROGRESS NOTES
Medical Oncology Progress Note  [facility]     PATIENT: Cindi Magaña  : 1970 54 y.o. female  MRN [id number]     PCP: Dalila Miles MD  Consult Requested By:      Date of Service: 6/10/2025    Subjective:   Interim History:    No chief complaint on file.    Cindi Magaña is here for f/up  Visit.    The patient has surgery and radiation therapy to the left breast at [facility] in 2020 without complications..  The patient here for follow-up visit and she has chronic pain she is on Arimidex 1 milligram p.o. daily.  She was previously being followed by Dr. Homero Vega then Dr. Diego 2022.    Oncology History:    Left Breast Invasive ductal carcinoma, grade 1, ER 93%, CA 75%, HER-2 negative (score 0), Ki-67 8%, and ductal carcinoma in situ, grade 1, cribriform type with no necrosis and focally associated fine needed microcalcifications, very focal     Infiltrating duct carcinoma of upper inner quadrant of left female breast     ==10/18/2019 Initial Diagnosis       10/18/19 Screening Mmg: -new focal area of architectural distortion located near the 10 o'clock position of the middle left breast that is worrisome for malignancy.  No definite associated mass or suspicious calcification.    Left Breast US:   ==10:00 zone 2 position irregular hypoechoic shadowing mass correlates with mammographic abnormality and measures 1×1.4×1.4 cm.     ==2019 Biopsy   [facility] Patholo  Invasive ductal carcinoma, grade 1, ER 93%, CA 75%, HER-2 negative (score 0), Ki-67 8%, and ductal carcinoma in situ, grade 1, cribriform type with no necrosis      [facility] Biopsies  Left Breast 9:00 core bx with [place] coil-shaped clip placement: INVASIVE DUCTAL CARCINOMA WITH FOCAL NEUROENDOCRINE DIFFERENTIATION, INTERMEDIATE NUCLEAR GRADE, JAYDON HISTOLOGIC GRADE 1, ER/CA 95, HER2 negative (0)  Left axillary lymph node FNA with [place] U-shaped clip placement: metastatic  adenocarcinoma     ==1/30/2020 Lung Bx MDASPECIMEN SOURCEA. Lung, left upper lobe, FNA  No malignant cells identified  Rare bronchial epithelial cells present    ==3/2/2020 Staging form: Breast, AJCC 8th Edition  - Pathologic stage from 3/2/2020: Stage IB (pT3, pN1a, cM0, G2, ER+, AZ+, HER2-) - Signed by Serafin Schneider MD on 3/3/2020  3/2020:   ==Oncotype DX recurrence score is 16 with distant recurrence risk at 9 years estimated to be 15% with hormonal therapy alone. There is no apparent benefit from chemotherapy. She will proceed with hormonal therapy alone with anastrozole 1 mg PO daily.   ==CT left lung nodule; per patient, this is old finding  ==08/06/2022: CT chest 8mm pulmonary nodule of the lingula with slight interval decrease in   size. There is questionable calcification within the central aspect of this    nodule.     Oncology History    No history exists.       Past Medical History:   Past Medical History:   Diagnosis Date    Breast cancer     Cancer     Heart disease     Hyperlipidemia     Hypertension     Kidney disease     Obesity     Rheumatoid arthritis involving multiple sites 2/27/2019    Stroke     Stroke        Past Surgical HIstory:   Past Surgical History:   Procedure Laterality Date    MASTECTOMY         Allergies:  Review of patient's allergies indicates:  No Known Allergies    Medications:  Current Outpatient Medications   Medication Sig Dispense Refill    allopurinoL (ZYLOPRIM) 300 MG tablet Take 1 tablet (300 mg total) by mouth once daily. 90 tablet 0    amLODIPine (NORVASC) 5 MG tablet Take 5 mg by mouth.      anastrozole (ARIMIDEX) 1 mg Tab Take 1 tablet (1 mg total) by mouth once daily. 90 tablet 4    aspirin (ECOTRIN) 81 MG EC tablet Aspir-81 81 mg tablet,delayed release   Take 1 tablet every day by oral route.      carvediloL (COREG) 25 MG tablet Take 1 tablet (25 mg total) by mouth 2 (two) times daily with meals. 180 tablet 0    fluticasone propionate (FLONASE) 50 mcg/actuation  nasal spray SHAKE LQ AND U 1 SPR IEN BID 18.2 mL 6    furosemide (LASIX) 20 MG tablet furosemide 20 mg tablet      gabapentin (NEURONTIN) 600 MG tablet Take 600 mg by mouth 2 (two) times daily.      HYDROcodone-acetaminophen (NORCO)  mg per tablet Take 1 tablet by mouth.      hydroquinone 4 % Crea Apply a thin layer to the affected areas once daily at night 28.35 g 0    mupirocin (BACTROBAN) 2 % ointment Apply topically 2 (two) times daily. 30 g 2    pantoprazole (PROTONIX) 40 MG tablet Take 1 tablet (40 mg total) by mouth once daily. 90 tablet 0    pravastatin (PRAVACHOL) 80 MG tablet Take 1 tablet (80 mg total) by mouth every evening. 90 tablet 3    tiZANidine (ZANAFLEX) 4 MG tablet Take 4 mg by mouth 3 (three) times daily.      tretinoin (RETIN-A) 0.025 % cream Apply topically every evening. 20 g 2    venlafaxine (EFFEXOR-XR) 75 MG 24 hr capsule TAKE 1 CAPSULE BY MOUTH EVERY DAY 90 capsule 1     No current facility-administered medications for this visit.       Family History:   Family History   Problem Relation Name Age of Onset    Hypertension Mother      Hypertension Father      Hypertension Sister      Lupus Sister      Hypertension Brother         Social History:  reports that she has quit smoking. Her smoking use included cigarettes. She has never been exposed to tobacco smoke. She has never used smokeless tobacco. She reports that she does not currently use alcohol. She reports that she does not use drugs.    Review of Systemas    Constitutional: No change in weight, appetie, fatigue, fever, or night sweats  Eyes: No changes in vision  Ears, Nose, Mouth, Throat, and Face: No hearing problems, ear pain, rummy nose, or sore throat  Respiratory: No shortness of breath or cough  Cardiovascular: No chest pain, palpitations or dizziness  Gastrointestinal: No abdominal pain, hematochezia, melena  Genitourinary: No dysuria, hematuria, nocturia, menstrual problems, post menopausal  Integumentary/Breast: No  rashes or itching  Hematologic/Lymphatic: No anemia or bleeding abnormalities  Musculoskeletal: No joint or muscle abnormalities  Neurological: No sensory or motor problems, no headaches  Behavioral/Psych: No depression, anxiety, cognitive problems, or stress  Endocrine: No diabetes or thyroid problem  Allergic/Immunologic: See allergy above    Physical Exam      Vitals:   There were no vitals filed for this visit.    BMI: There is no height or weight on file to calculate BMI.  BSA There is no height or weight on file to calculate BSA.  ECOG Performance Status:   ECOG SCORE           GENERAL APPEARANCE: Well developed, well nourished, in no acute distress.  SKIN: Inspection of the skin reveals no rashes, ulcerations or petechiae.  HEENT: The sclerae were anicteric and conjunctivae were pink and moist. Extraocular movements were intact and pupils were equal, round, and reactive to light with normal accommodation. External inspection of the ears and nose showed no scars, lesions, or masses. Lips, teeth, and gums showed normal mucosa. The oral mucosa, hard and soft palate, tongue and posterior pharynx were normal.  NECK: Supple and symmetric. There was no thyroid enlargement, and no tenderness, or masses were felt.  CRESPIRATORY: Normal AP diameter and normal contour without any kyphoscoliosis. LUNGS: Auscultation of the lungs revealed normal breath sounds without any other adventitious sounds or rubs.  CARDIOVASCULAR: There was a regular rate and rhythm without any murmurs, gallops, rubs. The carotid pulses were normal and 2+ bilaterally without bruits. Peripheral pulses were 2+ and symmetric.  GASTROINTESTINAL: Soft and nontender with normal bowel sounds. The liver span was approximately 5-6 cm in the right midclavicular line by percussion. The liver edge was nontender. The spleen was not palpable. There were no inguinal or umbilical hernias noted. No ascites was noted.  LYMPH NODES: No lymphadenopathy was  appreciated in the neck, axillae or groin.   MUSCULOSKELETAL: Gait was normal. There was no tenderness or effusions noted. Muscle strength and tone were normal. EXTREMITIES: No cyanosis, clubbing. +ve for Lymphedema in left upper extremitie  NEUROLOGIC: Alert and oriented x 3. Normal affect. Gait was normal. Normal deep tendon reflexes with no pathological reflexes. Sensation to touch was normal.  PSYCHIATRIC: good mood, orientated to place, time and person     Labs and Imagings     Orders Only on 12/11/2024   Component Date Value Ref Range Status    Sodium 12/11/2024 139  135 - 145 mmol/L Final    Potassium 12/11/2024 4.2  3.6 - 5.2 mmol/L Final    Chloride 12/11/2024 102  100 - 108 mmol/L Final    CO2 12/11/2024 34 (H)  21 - 32 mmol/L Final    Anion Gap 12/11/2024 3.0  3.0 - 11.0 mmol/L Final    BUN 12/11/2024 16  7 - 18 mg/dL Final    Creatinine 12/11/2024 1.43 (H)  0.55 - 1.02 mg/dL Final    GFR ESTIMATION 12/11/2024 43 (L)  >60 Final    CKD-EPI Creatinine Equation(2021) used for calculating GFR    BUN/Creatinine Ratio 12/11/2024 11.18  7 - 18 Ratio Final    Glucose 12/11/2024 93  70 - 110 mg/dL Final    Calcium 12/11/2024 9.7  8.8 - 10.5 mg/dL Final    Total Bilirubin 12/11/2024 0.4  0.0 - 1.0 mg/dL Final    AST 12/11/2024 29  15 - 37 U/L Final    ALT 12/11/2024 18  12 - 78 U/L Final    Total Protein 12/11/2024 6.8  6.4 - 8.2 g/dL Final    Albumin 12/11/2024 2.8 (L)  3.4 - 5.0 g/dL Final    Globulin 12/11/2024 4.0 (H)  2.3 - 3.5 g/dL Final    Albumin/Globulin Ratio 12/11/2024 0.700 (L)  1.1 - 1.8 Ratio Final    Alkaline Phosphatase 12/11/2024 163 (H)  46 - 116 U/L Final   Orders Only on 12/11/2024   Component Date Value Ref Range Status    WBC 12/11/2024 8.2  4.6 - 10.2 10*3/uL Final    RBC 12/11/2024 4.33  4.2 - 5.4 10*6/uL Final    Hemoglobin 12/11/2024 12.4  12.0 - 16.0 g/dL Final    Hematocrit 12/11/2024 39.3  37.0 - 47.0 % Final    MCV 12/11/2024 90.8  80 - 97 fL Final    MCH 12/11/2024 28.6  27.0 - 31.2  pg Final    MCHC 12/11/2024 31.6 (L)  31.8 - 35.4 g/dL Final    RDW RBC Auto-Rto 12/11/2024 14.5  12.5 - 18.0 % Final    Platelets 12/11/2024 278  142 - 424 10*3/uL Final    Neutrophils 12/11/2024 70.2  37 - 80 % Final    Lymphocytes 12/11/2024 18.7 (L)  25 - 40 % Final    Monocytes 12/11/2024 7.9  1 - 15 % Final    Eosinophils 12/11/2024 2.2  1 - 3 % Final    Basophils 12/11/2024 0.4  0 - 3 % Final    nRBC# 12/11/2024 0.0  % Final    Neutrophils Absolute 12/11/2024 5.76  1.8 - 7.7 10*3/uL Final   Orders Only on 09/25/2024   Component Date Value Ref Range Status    Vit D, 25-Hydroxy 09/25/2024 29 (L)  >30 ng/mL Final    Comment: Vitamin D deficiency has been defined by the [place] ofMedicine and an Endocrine Society practice guideline as alevel of serum 25-OH vitamin D less than 20 ng/mL. TheEndocrine Society further defined vitamin D insufficiency asa level between 21 and 29   ng/mL.     Orders Only on 09/25/2024   Component Date Value Ref Range Status    Microalb, Ur 09/25/2024 233.5 (H)  0 - 20 mg/L Final    Creatinine, Urine 09/25/2024 421.5 (H)  10 - 175 mg/dL Final    Microalb/Creat Ratio 09/25/2024 55 (H)  0 - 30 mg/g Final   Orders Only on 09/25/2024   Component Date Value Ref Range Status    Estimated Avg Glucose 09/25/2024 140  mg/dL Final    Hemoglobin A1C 09/25/2024 6.1  4.2 - 6.3 % Final    Comment: Ranges for Hemoglobin A1C in diabetic patients:Hemoglobin A1C          Degree of glucose control      <7.0%             Well-controlled      >8.0%             Poorly-controlledDegrees of glucose control based on goals set by theAmerican Diabetes   Association.     Orders Only on 09/25/2024   Component Date Value Ref Range Status    Sodium 09/25/2024 134  131 - 143 mmol/L Final    Potassium 09/25/2024 4.5  3.5 - 5.1 mmol/L Final    Chloride 09/25/2024 105  98 - 107 mmol/L Final    CO2 09/25/2024 22  21 - 32 mmol/L Final    Anion Gap 09/25/2024 7.0  3.0 - 11.0 mmol/L Final    BUN 09/25/2024 29.0 (H)  7.0 -  18.0 mg/dL Final    Creatinine 09/25/2024 2.96 (H)  0.55 - 1.02 mg/dL Final    GFR ESTIMATION 09/25/2024 20 (L)  >60 Final    BUN/Creatinine Ratio 09/25/2024 9.79  Ratio Final    Glucose 09/25/2024 98  74 - 106 mg/dL Final    Calcium 09/25/2024 9.3  8.5 - 10.1 mg/dL Final    Total Bilirubin 09/25/2024 0.3  0.2 - 1.0 mg/dL Final    AST 09/25/2024 19  15 - 37 U/L Final    ALT 09/25/2024 17  13 - 56 U/L Final    Total Protein 09/25/2024 7.3  6.4 - 8.2 g/dL Final    Albumin 09/25/2024 3.5  3.4 - 5.0 g/dL Final    Globulin 09/25/2024 3.8 (H)  2.3 - 3.5 g/dL Final    Albumin/Globulin Ratio 09/25/2024 0.921 (L)  1.1 - 1.8 Ratio Final    Alkaline Phosphatase 09/25/2024 141 (H)  45 - 117 U/L Final    Free T4 09/25/2024 1.03  0.76 - 1.46 ng/dL Final   Orders Only on 09/25/2024   Component Date Value Ref Range Status    WBC 09/25/2024 6.2  4.5 - 10.0 10*3/uL Final    RBC 09/25/2024 3.98 (L)  4.20 - 5.40 10*6/uL Final    Hemoglobin 09/25/2024 11.4 (L)  12.0 - 16.0 g/dL Final    Hematocrit 09/25/2024 37.4  37.0 - 47.0 % Final    MCV 09/25/2024 94.0  80.0 - 99.0 fL Final    MCH 09/25/2024 28.6  27.0 - 32.0 pg Final    MCHC 09/25/2024 30.5 (L)  32.0 - 36.0 % Final    RDW RBC Auto-Rto 09/25/2024 14.6  0.0 - 15.5 % Final    Platelets 09/25/2024 207  130 - 400 10*3/uL Final    MPV 09/25/2024 11.5  9.2 - 12.2 fL Final    Neutrophils 09/25/2024 64.2  50 - 80 % Final    Immature Granulocytes 09/25/2024 0.30  0.0 - 0.43 % Final    Lymphocytes 09/25/2024 25.2  20.0 - 45.0 % Final    Monocytes 09/25/2024 7.1  2 - 10 % Final    Eosinophils 09/25/2024 2.7  0 - 6 % Final    Basophils 09/25/2024 0.5  0 - 3 % Final    nRBC# 09/25/2024 0.0  0 - 0.2 % Final    Neutrophils Absolute 09/25/2024 4.0  1.4 - 7.0 10*3/uL Final    Immature Granulocytes Absolute 09/25/2024 0.0200  0.0 - 0.0310 thou/uL Final    Lymphocytes Absolute 09/25/2024 1.6  1.2 - 4.0 10*3/uL Final    Monocytes Absolute 09/25/2024 0.4  0.1 - 0.8 10*3/uL Final    Eosinophils Absolute  09/25/2024 0.2  0.0 - 0.6 10*3/uL Final    Basophils Absolute 09/25/2024 0.0  0.0 - 0.3 10*3/uL Final    nRBC Count Absolute 09/25/2024 0.000  0 - 0.012 thou/uL Final         Assessment:     Principle Problem: No primary diagnosis found.   Co-morbidity/Active Problems:   Patient Active Problem List   Diagnosis    Rheumatoid arthritis involving multiple sites    Sjogren's syndrome    Inflammation of sacroiliac joint    Ankylosing spondylitis    Cerebrovascular accident-2011    Essential hypertension    Hyperlipidemia    Osteoarthritis of knee    Carpal tunnel syndrome    Anxiety    Gastroesophageal reflux disease without esophagitis    Allergic rhinitis    Lumbar radiculopathy, chronic    Infiltrating ductal carcinoma of left breast, stage 2    Lung nodule    Stage 3a chronic kidney disease    Prediabetes    Gout    Vitamin D insufficiency    Acute renal failure    ELVI (acute kidney injury)    Malignant neoplasm metastatic to lymph node of axilla    Stage 3b chronic kidney disease        Cindi Magaña is a 54 y.o. female with PMH of There were no encounter diagnoses., with No primary diagnosis found.     ==============================================  Principal DX:  left segmental mastectomy and axillary node dissection.   Pathological findings shows large area of invasive ductal carcinoma, histological grade 2.  1 positive axillary node for metastasis.   pathological stage IB, histological grade 2, ER +, FL +, HER-2/ind[place] -, and Ki-67 low,left  breast cancer.   OncoType 16  S/p Radiation Oncology     Stage     Metastatic Sites node, ? Lung nodule . Patient reports this was biopsied at [facility] and was found to be non-malignant  Co-morbidities  RA      Plan:   Overall Plan:   Goal of Treatment:  Source of Guideline: NCCN version , Uptodate      1. Left Breast invasive ductal Carcinoma:     08/16/2022: Patient with Stage II breast cancer currently on Arimidex with questionable lung nodule at last visit. She  had repeat scan on 6/2/2022 which showed 8mm pulmonary nodule of the lingula with slight interval decrease in  size. There is questionable calcification within the central aspect of this  Nodule.  She had left breast mastectomy, but is due for left breast yearly mammo in September 2022. Patient reports she still has occasional lymphedema to left axilla and arm. She does have a lymphedema sleeve that helps. She wears at night.  Dexa completed 03/02/2022 reviewed with patient which showed normal bone density. She was previously taking otc Calcium+D, however, was changed to rx vitamin D per pcp and is not taking calcium.  I encouraged to continue otc Calcium, may get without added vitamin D if she is already taking rx.   ==02/28/2023: Arimidex well tolerated, mammo reviewed from September 2022 which was [facility].  No masses, lumps, axillary adenopathy or skin changes to breast.  CT reviewed, patient continues to have lung nodule, previously 8mm, now 1mm. Recommended repeat ct scan in 6 months, however declines further imaging of lung lesion.    08/29/2023: Doing well on Arimidex.  Continues to decline repeat ct chest.  No new masses, lumps or skin changes to breast.  She had left breast mastectomy, right breast mammo due September 2023, will order.  Plan to continue Arimidex  ==05/13/2024: Tolerating Arimidex well, bone density due, last was march 2022.  She sees Dr. Pham for RA, not on any DMARDs for RA. Also has lung nodule per previous ct scans which has been stable, is amendable to repeat ct scan to monitor, will order. Dimitrisr elvated at this [facility], she was a little dehydrated, has been wnl, recently saw Dr. Brice stated was stable, will recheck before ct chest.  Mammo due November. No changes to breast, no skin changes, masses, adenopathy.  ==12/12/2024: Mammo reviewed, cat 2, ARMIN.  CT chest reviewed, 8mm lung nodule noted stable since at least 2022.  Will continue monitoring and recheck in 6 months. Dexa scan reviewed,  bone density improved since 2022 and is normal.   ==06/12/2025: ct not completed, requests in December when mammo will also be due, dexa not due until November 2026. Tolerating Arimidex well, has occasional hot flashes which are mild, may try vitamin e. No new masses, adenopathy skin changes will continue arimidex    2. Lung Nodule:  ==Improved per March 2022 ct scan  ==Stable per ct     3. Long term use of Aromatase Inhibitor  ==Tolerated well  ==Dexa completed November 2024 -         To Do:  Continue Arimidex  Rtc 6 months cbc cmp  Mammo due November 2025  CT chest in 6 months prior to rtc    Total time spent in counseling and discussion was more than 30 minutes. I discussed in detail further management options at this time and discussion on relevant tests, imaging and ancillary tests as part of the management plan.

## 2025-06-12 ENCOUNTER — OFFICE VISIT (OUTPATIENT)
Dept: HEMATOLOGY/ONCOLOGY | Facility: CLINIC | Age: 55
End: 2025-06-12
Payer: MEDICARE

## 2025-06-12 VITALS
OXYGEN SATURATION: 95 % | BODY MASS INDEX: 33.97 KG/M2 | HEIGHT: 65 IN | WEIGHT: 203.88 LBS | RESPIRATION RATE: 16 BRPM | HEART RATE: 59 BPM | SYSTOLIC BLOOD PRESSURE: 122 MMHG | DIASTOLIC BLOOD PRESSURE: 78 MMHG

## 2025-06-12 DIAGNOSIS — C50.912 INFILTRATING DUCTAL CARCINOMA OF LEFT BREAST, STAGE 2: Primary | ICD-10-CM

## 2025-06-12 DIAGNOSIS — Z12.31 ENCOUNTER FOR SCREENING MAMMOGRAM FOR MALIGNANT NEOPLASM OF BREAST: ICD-10-CM

## 2025-06-12 DIAGNOSIS — R91.1 SOLITARY PULMONARY NODULE: ICD-10-CM

## 2025-06-12 RX ORDER — ANASTROZOLE 1 MG/1
1 TABLET ORAL DAILY
Qty: 90 TABLET | Refills: 4 | Status: SHIPPED | OUTPATIENT
Start: 2025-06-12

## 2025-06-25 LAB — CRC RECOMMENDATION EXT: NORMAL

## 2025-07-15 ENCOUNTER — PATIENT MESSAGE (OUTPATIENT)
Dept: FAMILY MEDICINE | Facility: CLINIC | Age: 55
End: 2025-07-15
Payer: MEDICARE

## 2025-07-15 ENCOUNTER — TELEPHONE (OUTPATIENT)
Dept: FAMILY MEDICINE | Facility: CLINIC | Age: 55
End: 2025-07-15
Payer: MEDICARE

## 2025-07-15 NOTE — TELEPHONE ENCOUNTER
Copied from CRM #[id number]. Topic: Appointments - Appointment Access  >> Jul 15, 2025 10:45 AM Summer wrote:  Type:  Patient Call Back Request     Who Called: Cindi   Message for Patient: Nurse   What this is regarding?: Est care   Would the patient rather a call back or a response via [facility]? Callback   Best Call Back Number: [phone]  Additional Information: Pt is wanting to est care with provider